# Patient Record
Sex: FEMALE | Race: WHITE | Employment: OTHER | URBAN - METROPOLITAN AREA
[De-identification: names, ages, dates, MRNs, and addresses within clinical notes are randomized per-mention and may not be internally consistent; named-entity substitution may affect disease eponyms.]

---

## 2019-03-04 ENCOUNTER — HOSPITAL ENCOUNTER (INPATIENT)
Age: 64
LOS: 10 days | Discharge: NURSING, REHAB OR LONG-TERM CARE | DRG: 811 | End: 2019-03-14
Attending: EMERGENCY MEDICINE | Admitting: INTERNAL MEDICINE
Payer: MEDICARE

## 2019-03-04 ENCOUNTER — APPOINTMENT (OUTPATIENT)
Dept: GENERAL RADIOLOGY | Age: 64
DRG: 811 | End: 2019-03-04
Payer: MEDICARE

## 2019-03-04 DIAGNOSIS — N39.0 ACUTE UTI: ICD-10-CM

## 2019-03-04 DIAGNOSIS — I95.9 TRANSIENT HYPOTENSION: ICD-10-CM

## 2019-03-04 DIAGNOSIS — D64.9 SYMPTOMATIC ANEMIA: Primary | ICD-10-CM

## 2019-03-04 DIAGNOSIS — G89.4 CHRONIC PAIN SYNDROME: ICD-10-CM

## 2019-03-04 DIAGNOSIS — R53.83 OTHER FATIGUE: ICD-10-CM

## 2019-03-04 LAB
A/G RATIO: 0.6 (ref 1.1–2.2)
ABO/RH: NORMAL
ALBUMIN SERPL-MCNC: 1.7 G/DL (ref 3.4–5)
ALP BLD-CCNC: 147 U/L (ref 40–129)
ALT SERPL-CCNC: 8 U/L (ref 10–40)
ANION GAP SERPL CALCULATED.3IONS-SCNC: 14 MMOL/L (ref 3–16)
ANTIBODY SCREEN: NORMAL
AST SERPL-CCNC: 18 U/L (ref 15–37)
BILIRUB SERPL-MCNC: 0.9 MG/DL (ref 0–1)
BILIRUB SERPL-MCNC: 1 MG/DL (ref 0–1)
BILIRUBIN DIRECT: 0.7 MG/DL (ref 0–0.3)
BILIRUBIN URINE: ABNORMAL
BILIRUBIN, INDIRECT: 0.2 MG/DL (ref 0–1)
BLOOD SMEAR REVIEW: NORMAL
BLOOD, URINE: NEGATIVE
BUN BLDV-MCNC: 9 MG/DL (ref 7–20)
CALCIUM SERPL-MCNC: 7.3 MG/DL (ref 8.3–10.6)
CHLORIDE BLD-SCNC: 95 MMOL/L (ref 99–110)
CLARITY: CLEAR
CO2: 22 MMOL/L (ref 21–32)
COLOR: ABNORMAL
CREAT SERPL-MCNC: 0.5 MG/DL (ref 0.6–1.2)
EPITHELIAL CELLS, UA: 1 /HPF (ref 0–5)
FERRITIN: 18.3 NG/ML (ref 15–150)
GFR AFRICAN AMERICAN: >60
GFR NON-AFRICAN AMERICAN: >60
GLOBULIN: 3 G/DL
GLUCOSE BLD-MCNC: 95 MG/DL (ref 70–99)
GLUCOSE URINE: NEGATIVE MG/DL
HAPTOGLOBIN: 78 MG/DL (ref 30–200)
HCT VFR BLD CALC: 11.6 % (ref 36–48)
HYALINE CASTS: 1 /LPF (ref 0–8)
IMMATURE RETIC FRACT: 0.41 (ref 0.21–0.37)
IRON SATURATION: 8 % (ref 15–50)
IRON: 15 UG/DL (ref 37–145)
KETONES, URINE: ABNORMAL MG/DL
LACTATE DEHYDROGENASE: 244 U/L (ref 100–190)
LACTIC ACID: 2.2 MMOL/L (ref 0.4–2)
LEUKOCYTE ESTERASE, URINE: ABNORMAL
LIPASE: 32 U/L (ref 13–60)
MICROSCOPIC EXAMINATION: YES
NITRITE, URINE: NEGATIVE
OCCULT BLOOD DIAGNOSTIC: NORMAL
PH UA: 5.5 (ref 5–8)
POTASSIUM REFLEX MAGNESIUM: 3.7 MMOL/L (ref 3.5–5.1)
PROTEIN UA: NEGATIVE MG/DL
RBC UA: 2 /HPF (ref 0–4)
RETICULOCYTE ABSOLUTE COUNT: 0.05 M/UL (ref 0.02–0.1)
RETICULOCYTE COUNT PCT: 3.37 % (ref 0.5–2.18)
SODIUM BLD-SCNC: 131 MMOL/L (ref 136–145)
SPECIFIC GRAVITY UA: 1.01 (ref 1–1.03)
TOTAL IRON BINDING CAPACITY: 189 UG/DL (ref 260–445)
TOTAL PROTEIN: 4.7 G/DL (ref 6.4–8.2)
TROPONIN: <0.01 NG/ML
URINE REFLEX TO CULTURE: YES
URINE TYPE: ABNORMAL
UROBILINOGEN, URINE: 4 E.U./DL
WBC UA: 19 /HPF (ref 0–5)

## 2019-03-04 PROCEDURE — 82247 BILIRUBIN TOTAL: CPT

## 2019-03-04 PROCEDURE — 83605 ASSAY OF LACTIC ACID: CPT

## 2019-03-04 PROCEDURE — 1200000000 HC SEMI PRIVATE

## 2019-03-04 PROCEDURE — 71045 X-RAY EXAM CHEST 1 VIEW: CPT

## 2019-03-04 PROCEDURE — 81001 URINALYSIS AUTO W/SCOPE: CPT

## 2019-03-04 PROCEDURE — 2580000003 HC RX 258: Performed by: EMERGENCY MEDICINE

## 2019-03-04 PROCEDURE — 86900 BLOOD TYPING SEROLOGIC ABO: CPT

## 2019-03-04 PROCEDURE — 6360000002 HC RX W HCPCS: Performed by: EMERGENCY MEDICINE

## 2019-03-04 PROCEDURE — 86923 COMPATIBILITY TEST ELECTRIC: CPT

## 2019-03-04 PROCEDURE — 83540 ASSAY OF IRON: CPT

## 2019-03-04 PROCEDURE — 83010 ASSAY OF HAPTOGLOBIN QUANT: CPT

## 2019-03-04 PROCEDURE — 85025 COMPLETE CBC W/AUTO DIFF WBC: CPT

## 2019-03-04 PROCEDURE — 96361 HYDRATE IV INFUSION ADD-ON: CPT

## 2019-03-04 PROCEDURE — 85045 AUTOMATED RETICULOCYTE COUNT: CPT

## 2019-03-04 PROCEDURE — 86850 RBC ANTIBODY SCREEN: CPT

## 2019-03-04 PROCEDURE — 83615 LACTATE (LD) (LDH) ENZYME: CPT

## 2019-03-04 PROCEDURE — 82728 ASSAY OF FERRITIN: CPT

## 2019-03-04 PROCEDURE — 83550 IRON BINDING TEST: CPT

## 2019-03-04 PROCEDURE — 80053 COMPREHEN METABOLIC PANEL: CPT

## 2019-03-04 PROCEDURE — 82248 BILIRUBIN DIRECT: CPT

## 2019-03-04 PROCEDURE — 86901 BLOOD TYPING SEROLOGIC RH(D): CPT

## 2019-03-04 PROCEDURE — 87086 URINE CULTURE/COLONY COUNT: CPT

## 2019-03-04 PROCEDURE — 36430 TRANSFUSION BLD/BLD COMPNT: CPT

## 2019-03-04 PROCEDURE — 96365 THER/PROPH/DIAG IV INF INIT: CPT

## 2019-03-04 PROCEDURE — P9016 RBC LEUKOCYTES REDUCED: HCPCS

## 2019-03-04 PROCEDURE — 83690 ASSAY OF LIPASE: CPT

## 2019-03-04 PROCEDURE — G0328 FECAL BLOOD SCRN IMMUNOASSAY: HCPCS

## 2019-03-04 PROCEDURE — 99285 EMERGENCY DEPT VISIT HI MDM: CPT

## 2019-03-04 PROCEDURE — 93005 ELECTROCARDIOGRAM TRACING: CPT | Performed by: EMERGENCY MEDICINE

## 2019-03-04 PROCEDURE — 84484 ASSAY OF TROPONIN QUANT: CPT

## 2019-03-04 RX ORDER — BUPRENORPHINE AND NALOXONE 8; 2 MG/1; MG/1
1 FILM, SOLUBLE BUCCAL; SUBLINGUAL 2 TIMES DAILY
Status: ON HOLD | COMMUNITY
End: 2019-03-14 | Stop reason: SDUPTHER

## 2019-03-04 RX ORDER — 0.9 % SODIUM CHLORIDE 0.9 %
1000 INTRAVENOUS SOLUTION INTRAVENOUS ONCE
Status: COMPLETED | OUTPATIENT
Start: 2019-03-04 | End: 2019-03-04

## 2019-03-04 RX ORDER — CALCIUM CARBONATE 200(500)MG
1 TABLET,CHEWABLE ORAL PRN
Status: ON HOLD | COMMUNITY
End: 2022-09-06 | Stop reason: SDUPTHER

## 2019-03-04 RX ORDER — BUPRENORPHINE HYDROCHLORIDE, NALOXONE HYDROCHLORIDE 8; 2 MG/1; MG/1
FILM, SOLUBLE BUCCAL; SUBLINGUAL
Refills: 0 | COMMUNITY
Start: 2019-02-05 | End: 2019-03-04

## 2019-03-04 RX ORDER — 0.9 % SODIUM CHLORIDE 0.9 %
250 INTRAVENOUS SOLUTION INTRAVENOUS ONCE
Status: COMPLETED | OUTPATIENT
Start: 2019-03-04 | End: 2019-03-05

## 2019-03-04 RX ADMIN — CEFTRIAXONE 1 G: 1 INJECTION, POWDER, FOR SOLUTION INTRAMUSCULAR; INTRAVENOUS at 22:59

## 2019-03-04 RX ADMIN — SODIUM CHLORIDE 1000 ML: 9 INJECTION, SOLUTION INTRAVENOUS at 18:04

## 2019-03-04 RX ADMIN — SODIUM CHLORIDE 250 ML: 9 INJECTION, SOLUTION INTRAVENOUS at 22:22

## 2019-03-04 ASSESSMENT — ENCOUNTER SYMPTOMS
BACK PAIN: 0
TROUBLE SWALLOWING: 0
PHOTOPHOBIA: 0
COUGH: 0
NAUSEA: 1
SHORTNESS OF BREATH: 0
VOMITING: 1
ABDOMINAL PAIN: 0
CHEST TIGHTNESS: 0
COLOR CHANGE: 0

## 2019-03-05 ENCOUNTER — APPOINTMENT (OUTPATIENT)
Dept: CT IMAGING | Age: 64
DRG: 811 | End: 2019-03-05
Payer: MEDICARE

## 2019-03-05 PROBLEM — E43 SEVERE MALNUTRITION (HCC): Chronic | Status: ACTIVE | Noted: 2019-03-05

## 2019-03-05 LAB
ANISOCYTOSIS: ABNORMAL
ANISOCYTOSIS: ABNORMAL
APTT: 48.7 SEC (ref 26–36)
BASOPHILS ABSOLUTE: 0 K/UL (ref 0–0.2)
BASOPHILS ABSOLUTE: 0 K/UL (ref 0–0.2)
BASOPHILS RELATIVE PERCENT: 0.2 %
BASOPHILS RELATIVE PERCENT: 0.2 %
EOSINOPHILS ABSOLUTE: 0 K/UL (ref 0–0.6)
EOSINOPHILS ABSOLUTE: 0 K/UL (ref 0–0.6)
EOSINOPHILS RELATIVE PERCENT: 0 %
EOSINOPHILS RELATIVE PERCENT: 0.1 %
FIBRINOGEN: 61 MG/DL (ref 200–397)
HCT VFR BLD CALC: 11.6 % (ref 36–48)
HCT VFR BLD CALC: 32.8 % (ref 36–48)
HCT VFR BLD CALC: 33.6 % (ref 36–48)
HEMATOLOGY PATH CONSULT: NO
HEMATOLOGY PATH CONSULT: NORMAL
HEMOGLOBIN: 10.5 G/DL (ref 12–16)
HEMOGLOBIN: 10.5 G/DL (ref 12–16)
HEMOGLOBIN: 3 G/DL (ref 12–16)
HYPOCHROMIA: ABNORMAL
HYPOCHROMIA: ABNORMAL
INR BLD: 1.95 (ref 0.86–1.14)
LYMPHOCYTES ABSOLUTE: 0.9 K/UL (ref 1–5.1)
LYMPHOCYTES ABSOLUTE: 1.1 K/UL (ref 1–5.1)
LYMPHOCYTES RELATIVE PERCENT: 15.2 %
LYMPHOCYTES RELATIVE PERCENT: 15.6 %
MCH RBC QN AUTO: 17 PG (ref 26–34)
MCH RBC QN AUTO: 26.6 PG (ref 26–34)
MCHC RBC AUTO-ENTMCNC: 25.6 G/DL (ref 31–36)
MCHC RBC AUTO-ENTMCNC: 31.9 G/DL (ref 31–36)
MCV RBC AUTO: 66.6 FL (ref 80–100)
MCV RBC AUTO: 83.2 FL (ref 80–100)
MICROCYTES: ABNORMAL
MONOCYTES ABSOLUTE: 0.3 K/UL (ref 0–1.3)
MONOCYTES ABSOLUTE: 0.4 K/UL (ref 0–1.3)
MONOCYTES RELATIVE PERCENT: 5.3 %
MONOCYTES RELATIVE PERCENT: 6.1 %
NEUTROPHILS ABSOLUTE: 4.4 K/UL (ref 1.7–7.7)
NEUTROPHILS ABSOLUTE: 5.5 K/UL (ref 1.7–7.7)
NEUTROPHILS RELATIVE PERCENT: 78.4 %
NEUTROPHILS RELATIVE PERCENT: 78.9 %
PDW BLD-RTO: 22.1 % (ref 12.4–15.4)
PDW BLD-RTO: 23.3 % (ref 12.4–15.4)
PHOSPHORUS: 3 MG/DL (ref 2.5–4.9)
PLATELET # BLD: 118 K/UL (ref 135–450)
PLATELET # BLD: 229 K/UL (ref 135–450)
PMV BLD AUTO: 9.1 FL (ref 5–10.5)
PMV BLD AUTO: 9.4 FL (ref 5–10.5)
POIKILOCYTES: ABNORMAL
POLYCHROMASIA: ABNORMAL
POLYCHROMASIA: ABNORMAL
PROTHROMBIN TIME: 22.2 SEC (ref 9.8–13)
RBC # BLD: 1.7 M/UL (ref 4–5.2)
RBC # BLD: 3.94 M/UL (ref 4–5.2)
STOMATOCYTES: ABNORMAL
TARGET CELLS: ABNORMAL
TSH SERPL DL<=0.05 MIU/L-ACNC: 0.12 UIU/ML (ref 0.27–4.2)
WBC # BLD: 5.5 K/UL (ref 4–11)
WBC # BLD: 7 K/UL (ref 4–11)

## 2019-03-05 PROCEDURE — 2580000003 HC RX 258: Performed by: INTERNAL MEDICINE

## 2019-03-05 PROCEDURE — 6360000002 HC RX W HCPCS: Performed by: INTERNAL MEDICINE

## 2019-03-05 PROCEDURE — 36415 COLL VENOUS BLD VENIPUNCTURE: CPT

## 2019-03-05 PROCEDURE — 85018 HEMOGLOBIN: CPT

## 2019-03-05 PROCEDURE — 97162 PT EVAL MOD COMPLEX 30 MIN: CPT

## 2019-03-05 PROCEDURE — C9113 INJ PANTOPRAZOLE SODIUM, VIA: HCPCS | Performed by: INTERNAL MEDICINE

## 2019-03-05 PROCEDURE — 85730 THROMBOPLASTIN TIME PARTIAL: CPT

## 2019-03-05 PROCEDURE — 6360000004 HC RX CONTRAST MEDICATION: Performed by: FAMILY MEDICINE

## 2019-03-05 PROCEDURE — 84443 ASSAY THYROID STIM HORMONE: CPT

## 2019-03-05 PROCEDURE — P9016 RBC LEUKOCYTES REDUCED: HCPCS

## 2019-03-05 PROCEDURE — 93010 ELECTROCARDIOGRAM REPORT: CPT | Performed by: INTERNAL MEDICINE

## 2019-03-05 PROCEDURE — 71260 CT THORAX DX C+: CPT

## 2019-03-05 PROCEDURE — 6360000002 HC RX W HCPCS: Performed by: FAMILY MEDICINE

## 2019-03-05 PROCEDURE — 85025 COMPLETE CBC W/AUTO DIFF WBC: CPT

## 2019-03-05 PROCEDURE — 2500000003 HC RX 250 WO HCPCS: Performed by: INTERNAL MEDICINE

## 2019-03-05 PROCEDURE — 85384 FIBRINOGEN ACTIVITY: CPT

## 2019-03-05 PROCEDURE — 97166 OT EVAL MOD COMPLEX 45 MIN: CPT

## 2019-03-05 PROCEDURE — 97535 SELF CARE MNGMENT TRAINING: CPT

## 2019-03-05 PROCEDURE — 93971 EXTREMITY STUDY: CPT

## 2019-03-05 PROCEDURE — 84100 ASSAY OF PHOSPHORUS: CPT

## 2019-03-05 PROCEDURE — 2060000000 HC ICU INTERMEDIATE R&B

## 2019-03-05 PROCEDURE — 85610 PROTHROMBIN TIME: CPT

## 2019-03-05 PROCEDURE — 85014 HEMATOCRIT: CPT

## 2019-03-05 PROCEDURE — 97530 THERAPEUTIC ACTIVITIES: CPT

## 2019-03-05 PROCEDURE — 36430 TRANSFUSION BLD/BLD COMPNT: CPT

## 2019-03-05 PROCEDURE — 6370000000 HC RX 637 (ALT 250 FOR IP): Performed by: INTERNAL MEDICINE

## 2019-03-05 PROCEDURE — 94760 N-INVAS EAR/PLS OXIMETRY 1: CPT

## 2019-03-05 RX ORDER — PANTOPRAZOLE SODIUM 40 MG/10ML
40 INJECTION, POWDER, LYOPHILIZED, FOR SOLUTION INTRAVENOUS 2 TIMES DAILY
Status: DISCONTINUED | OUTPATIENT
Start: 2019-03-05 | End: 2019-03-08

## 2019-03-05 RX ORDER — ACETAMINOPHEN 325 MG/1
650 TABLET ORAL EVERY 8 HOURS PRN
Status: DISCONTINUED | OUTPATIENT
Start: 2019-03-05 | End: 2019-03-13 | Stop reason: SDUPTHER

## 2019-03-05 RX ORDER — THIAMINE HYDROCHLORIDE 100 MG/ML
100 INJECTION, SOLUTION INTRAMUSCULAR; INTRAVENOUS DAILY
Status: DISCONTINUED | OUTPATIENT
Start: 2019-03-06 | End: 2019-03-05

## 2019-03-05 RX ORDER — BUPRENORPHINE AND NALOXONE 8; 2 MG/1; MG/1
1 FILM, SOLUBLE BUCCAL; SUBLINGUAL 2 TIMES DAILY
Status: DISCONTINUED | OUTPATIENT
Start: 2019-03-05 | End: 2019-03-05

## 2019-03-05 RX ORDER — HEPARIN SODIUM 1000 [USP'U]/ML
40 INJECTION, SOLUTION INTRAVENOUS; SUBCUTANEOUS PRN
Status: DISCONTINUED | OUTPATIENT
Start: 2019-03-05 | End: 2019-03-05

## 2019-03-05 RX ORDER — POLYETHYLENE GLYCOL 3350 17 G/17G
17 POWDER, FOR SOLUTION ORAL DAILY
Status: DISCONTINUED | OUTPATIENT
Start: 2019-03-05 | End: 2019-03-09

## 2019-03-05 RX ORDER — PEDIATRIC MULTIPLE VITAMIN LIQ
5 LIQUID ORAL DAILY
Status: DISCONTINUED | OUTPATIENT
Start: 2019-03-06 | End: 2019-03-07

## 2019-03-05 RX ORDER — SODIUM CHLORIDE 0.9 % (FLUSH) 0.9 %
10 SYRINGE (ML) INJECTION PRN
Status: DISCONTINUED | OUTPATIENT
Start: 2019-03-05 | End: 2019-03-07 | Stop reason: SDUPTHER

## 2019-03-05 RX ORDER — 0.9 % SODIUM CHLORIDE 0.9 %
10 VIAL (ML) INJECTION 2 TIMES DAILY
Status: DISCONTINUED | OUTPATIENT
Start: 2019-03-05 | End: 2019-03-08

## 2019-03-05 RX ORDER — SODIUM CHLORIDE 0.9 % (FLUSH) 0.9 %
10 SYRINGE (ML) INJECTION EVERY 12 HOURS SCHEDULED
Status: DISCONTINUED | OUTPATIENT
Start: 2019-03-05 | End: 2019-03-07 | Stop reason: SDUPTHER

## 2019-03-05 RX ORDER — HEPARIN SODIUM 10000 [USP'U]/100ML
2.5 INJECTION, SOLUTION INTRAVENOUS CONTINUOUS
Status: DISCONTINUED | OUTPATIENT
Start: 2019-03-05 | End: 2019-03-09

## 2019-03-05 RX ORDER — HEPARIN SODIUM 1000 [USP'U]/ML
2700 INJECTION, SOLUTION INTRAVENOUS; SUBCUTANEOUS ONCE
Status: COMPLETED | OUTPATIENT
Start: 2019-03-05 | End: 2019-03-05

## 2019-03-05 RX ORDER — BUPRENORPHINE AND NALOXONE 8; 2 MG/1; MG/1
0.5 FILM, SOLUBLE BUCCAL; SUBLINGUAL 2 TIMES DAILY
Status: DISCONTINUED | OUTPATIENT
Start: 2019-03-05 | End: 2019-03-06

## 2019-03-05 RX ORDER — HEPARIN SODIUM 1000 [USP'U]/ML
80 INJECTION, SOLUTION INTRAVENOUS; SUBCUTANEOUS PRN
Status: DISCONTINUED | OUTPATIENT
Start: 2019-03-05 | End: 2019-03-05

## 2019-03-05 RX ORDER — THIAMINE HYDROCHLORIDE 100 MG/ML
100 INJECTION, SOLUTION INTRAMUSCULAR; INTRAVENOUS DAILY
Status: DISCONTINUED | OUTPATIENT
Start: 2019-03-05 | End: 2019-03-05

## 2019-03-05 RX ADMIN — PANTOPRAZOLE SODIUM 40 MG: 40 INJECTION, POWDER, FOR SOLUTION INTRAVENOUS at 08:17

## 2019-03-05 RX ADMIN — PANTOPRAZOLE SODIUM 40 MG: 40 INJECTION, POWDER, FOR SOLUTION INTRAVENOUS at 21:38

## 2019-03-05 RX ADMIN — FOLIC ACID: 5 INJECTION, SOLUTION INTRAMUSCULAR; INTRAVENOUS; SUBCUTANEOUS at 09:31

## 2019-03-05 RX ADMIN — BUPRENORPHINE HYDROCHLORIDE, NALOXONE HYDROCHLORIDE 1 FILM: 8; 2 FILM, SOLUBLE BUCCAL; SUBLINGUAL at 03:28

## 2019-03-05 RX ADMIN — POLYETHYLENE GLYCOL 3350 17 G: 17 POWDER, FOR SOLUTION ORAL at 15:53

## 2019-03-05 RX ADMIN — IOHEXOL 50 ML: 240 INJECTION, SOLUTION INTRATHECAL; INTRAVASCULAR; INTRAVENOUS; ORAL at 13:40

## 2019-03-05 RX ADMIN — IRON SUCROSE 200 MG: 20 INJECTION, SOLUTION INTRAVENOUS at 12:22

## 2019-03-05 RX ADMIN — BUPRENORPHINE HYDROCHLORIDE, NALOXONE HYDROCHLORIDE 0.5 FILM: 8; 2 FILM, SOLUBLE BUCCAL; SUBLINGUAL at 21:36

## 2019-03-05 RX ADMIN — HEPARIN SODIUM AND DEXTROSE 6 ML/HR: 10000; 5 INJECTION INTRAVENOUS at 17:01

## 2019-03-05 RX ADMIN — Medication 10 ML: at 03:17

## 2019-03-05 RX ADMIN — Medication 10 ML: at 08:17

## 2019-03-05 RX ADMIN — PANTOPRAZOLE SODIUM 40 MG: 40 INJECTION, POWDER, FOR SOLUTION INTRAVENOUS at 03:16

## 2019-03-05 RX ADMIN — HEPARIN SODIUM 2700 UNITS: 1000 INJECTION INTRAVENOUS; SUBCUTANEOUS at 17:01

## 2019-03-05 RX ADMIN — Medication 10 ML: at 09:32

## 2019-03-05 RX ADMIN — Medication 10 ML: at 21:38

## 2019-03-05 RX ADMIN — IOPAMIDOL 75 ML: 755 INJECTION, SOLUTION INTRAVENOUS at 13:41

## 2019-03-05 RX ADMIN — BUPRENORPHINE HYDROCHLORIDE, NALOXONE HYDROCHLORIDE 0.5 FILM: 8; 2 FILM, SOLUBLE BUCCAL; SUBLINGUAL at 12:22

## 2019-03-05 ASSESSMENT — PAIN DESCRIPTION - ORIENTATION
ORIENTATION: RIGHT;LEFT;MID
ORIENTATION: RIGHT;LEFT;MID
ORIENTATION: LEFT;RIGHT

## 2019-03-05 ASSESSMENT — PAIN DESCRIPTION - PAIN TYPE
TYPE: CHRONIC PAIN

## 2019-03-05 ASSESSMENT — PAIN DESCRIPTION - LOCATION
LOCATION: BACK;LEG

## 2019-03-05 ASSESSMENT — PAIN DESCRIPTION - DESCRIPTORS
DESCRIPTORS: RADIATING
DESCRIPTORS: THROBBING
DESCRIPTORS: RADIATING

## 2019-03-05 ASSESSMENT — PAIN SCALES - GENERAL
PAINLEVEL_OUTOF10: 4
PAINLEVEL_OUTOF10: 2
PAINLEVEL_OUTOF10: 7
PAINLEVEL_OUTOF10: 0
PAINLEVEL_OUTOF10: 2
PAINLEVEL_OUTOF10: 4
PAINLEVEL_OUTOF10: 0
PAINLEVEL_OUTOF10: 0

## 2019-03-06 ENCOUNTER — ANESTHESIA (OUTPATIENT)
Dept: ENDOSCOPY | Age: 64
DRG: 811 | End: 2019-03-06
Payer: MEDICARE

## 2019-03-06 ENCOUNTER — ANESTHESIA EVENT (OUTPATIENT)
Dept: ENDOSCOPY | Age: 64
DRG: 811 | End: 2019-03-06
Payer: MEDICARE

## 2019-03-06 VITALS
RESPIRATION RATE: 14 BRPM | OXYGEN SATURATION: 100 % | DIASTOLIC BLOOD PRESSURE: 62 MMHG | SYSTOLIC BLOOD PRESSURE: 101 MMHG

## 2019-03-06 LAB
ALBUMIN SERPL-MCNC: 1.7 G/DL (ref 3.1–4.9)
ALPHA-1-GLOBULIN: 0.3 G/DL (ref 0.2–0.4)
ALPHA-2-GLOBULIN: 0.4 G/DL (ref 0.4–1.1)
AMYLASE FLUID: >7500 U/L
ANION GAP SERPL CALCULATED.3IONS-SCNC: 14 MMOL/L (ref 3–16)
ANISOCYTOSIS: ABNORMAL
APTT: 59.4 SEC (ref 26–36)
APTT: 61.3 SEC (ref 26–36)
BASOPHILS ABSOLUTE: 0 K/UL (ref 0–0.2)
BASOPHILS ABSOLUTE: 0 K/UL (ref 0–0.2)
BASOPHILS ABSOLUTE: 0.1 K/UL (ref 0–0.2)
BASOPHILS RELATIVE PERCENT: 0.2 %
BASOPHILS RELATIVE PERCENT: 0.2 %
BASOPHILS RELATIVE PERCENT: 1.3 %
BETA GLOBULIN: 0.7 G/DL (ref 0.9–1.6)
BUN BLDV-MCNC: 7 MG/DL (ref 7–20)
CALCIUM SERPL-MCNC: 7.3 MG/DL (ref 8.3–10.6)
CHLORIDE BLD-SCNC: 100 MMOL/L (ref 99–110)
CO2: 20 MMOL/L (ref 21–32)
CREAT SERPL-MCNC: 0.5 MG/DL (ref 0.6–1.2)
EOSINOPHILS ABSOLUTE: 0 K/UL (ref 0–0.6)
EOSINOPHILS RELATIVE PERCENT: 0.3 %
EOSINOPHILS RELATIVE PERCENT: 0.4 %
EOSINOPHILS RELATIVE PERCENT: 0.4 %
FLUID TYPE: NORMAL
FOLATE: 12.5 NG/ML (ref 4.78–24.2)
GAMMA GLOBULIN: 1 G/DL (ref 0.6–1.8)
GFR AFRICAN AMERICAN: >60
GFR NON-AFRICAN AMERICAN: >60
GLUCOSE BLD-MCNC: 64 MG/DL (ref 70–99)
HCT VFR BLD CALC: 29.6 % (ref 36–48)
HCT VFR BLD CALC: 29.6 % (ref 36–48)
HCT VFR BLD CALC: 29.7 % (ref 36–48)
HCT VFR BLD CALC: 32.7 % (ref 36–48)
HEMOGLOBIN: 10.2 G/DL (ref 12–16)
HEMOGLOBIN: 9.4 G/DL (ref 12–16)
LYMPHOCYTES ABSOLUTE: 0.9 K/UL (ref 1–5.1)
LYMPHOCYTES ABSOLUTE: 1 K/UL (ref 1–5.1)
LYMPHOCYTES ABSOLUTE: 1.3 K/UL (ref 1–5.1)
LYMPHOCYTES RELATIVE PERCENT: 15.3 %
LYMPHOCYTES RELATIVE PERCENT: 15.6 %
LYMPHOCYTES RELATIVE PERCENT: 20.3 %
MCH RBC QN AUTO: 26.3 PG (ref 26–34)
MCH RBC QN AUTO: 26.5 PG (ref 26–34)
MCH RBC QN AUTO: 26.7 PG (ref 26–34)
MCHC RBC AUTO-ENTMCNC: 31.1 G/DL (ref 31–36)
MCHC RBC AUTO-ENTMCNC: 31.7 G/DL (ref 31–36)
MCHC RBC AUTO-ENTMCNC: 31.8 G/DL (ref 31–36)
MCV RBC AUTO: 83.6 FL (ref 80–100)
MCV RBC AUTO: 84.1 FL (ref 80–100)
MCV RBC AUTO: 84.6 FL (ref 80–100)
MONOCYTES ABSOLUTE: 0.3 K/UL (ref 0–1.3)
MONOCYTES RELATIVE PERCENT: 5.2 %
MONOCYTES RELATIVE PERCENT: 5.3 %
MONOCYTES RELATIVE PERCENT: 5.9 %
NEUTROPHILS ABSOLUTE: 4.6 K/UL (ref 1.7–7.7)
NEUTROPHILS ABSOLUTE: 4.6 K/UL (ref 1.7–7.7)
NEUTROPHILS ABSOLUTE: 4.9 K/UL (ref 1.7–7.7)
NEUTROPHILS RELATIVE PERCENT: 72.7 %
NEUTROPHILS RELATIVE PERCENT: 78.3 %
NEUTROPHILS RELATIVE PERCENT: 78.6 %
PDW BLD-RTO: 22.8 % (ref 12.4–15.4)
PDW BLD-RTO: 22.9 % (ref 12.4–15.4)
PDW BLD-RTO: 23.4 % (ref 12.4–15.4)
PHOSPHORUS: 2.5 MG/DL (ref 2.5–4.9)
PLATELET # BLD: 103 K/UL (ref 135–450)
PLATELET # BLD: 111 K/UL (ref 135–450)
PLATELET # BLD: 98 K/UL (ref 135–450)
PLATELET SLIDE REVIEW: ABNORMAL
PMV BLD AUTO: 8.6 FL (ref 5–10.5)
PMV BLD AUTO: 8.7 FL (ref 5–10.5)
PMV BLD AUTO: 8.9 FL (ref 5–10.5)
POLYCHROMASIA: ABNORMAL
POTASSIUM SERPL-SCNC: 3.5 MMOL/L (ref 3.5–5.1)
RBC # BLD: 3.52 M/UL (ref 4–5.2)
RBC # BLD: 3.55 M/UL (ref 4–5.2)
RBC # BLD: 3.86 M/UL (ref 4–5.2)
REASON FOR REJECTION: NORMAL
REJECTED TEST: NORMAL
SLIDE REVIEW: ABNORMAL
SLIDE REVIEW: ABNORMAL
SODIUM BLD-SCNC: 134 MMOL/L (ref 136–145)
SPE/IFE INTERPRETATION: NORMAL
T4 FREE: 0.8 NG/DL (ref 0.9–1.8)
TOTAL PROTEIN: 4.1 G/DL (ref 6.4–8.2)
URINE CULTURE, ROUTINE: NORMAL
VITAMIN B-12: 1478 PG/ML (ref 211–911)
WBC # BLD: 5.9 K/UL (ref 4–11)
WBC # BLD: 6.3 K/UL (ref 4–11)
WBC # BLD: 6.4 K/UL (ref 4–11)

## 2019-03-06 PROCEDURE — 0DJ07ZZ INSPECTION OF UPPER INTESTINAL TRACT, VIA NATURAL OR ARTIFICIAL OPENING: ICD-10-PCS | Performed by: INTERNAL MEDICINE

## 2019-03-06 PROCEDURE — 2060000000 HC ICU INTERMEDIATE R&B

## 2019-03-06 PROCEDURE — 6360000002 HC RX W HCPCS: Performed by: INTERNAL MEDICINE

## 2019-03-06 PROCEDURE — 82746 ASSAY OF FOLIC ACID SERUM: CPT

## 2019-03-06 PROCEDURE — 82150 ASSAY OF AMYLASE: CPT

## 2019-03-06 PROCEDURE — 3700000000 HC ANESTHESIA ATTENDED CARE: Performed by: INTERNAL MEDICINE

## 2019-03-06 PROCEDURE — C1726 CATH, BAL DIL, NON-VASCULAR: HCPCS | Performed by: INTERNAL MEDICINE

## 2019-03-06 PROCEDURE — 3609020800 HC EGD W/EUS FNA: Performed by: INTERNAL MEDICINE

## 2019-03-06 PROCEDURE — 2580000003 HC RX 258: Performed by: INTERNAL MEDICINE

## 2019-03-06 PROCEDURE — 6370000000 HC RX 637 (ALT 250 FOR IP): Performed by: INTERNAL MEDICINE

## 2019-03-06 PROCEDURE — 94760 N-INVAS EAR/PLS OXIMETRY 1: CPT

## 2019-03-06 PROCEDURE — 36415 COLL VENOUS BLD VENIPUNCTURE: CPT

## 2019-03-06 PROCEDURE — 88305 TISSUE EXAM BY PATHOLOGIST: CPT

## 2019-03-06 PROCEDURE — 2709999900 HC NON-CHARGEABLE SUPPLY: Performed by: INTERNAL MEDICINE

## 2019-03-06 PROCEDURE — 85025 COMPLETE CBC W/AUTO DIFF WBC: CPT

## 2019-03-06 PROCEDURE — 82607 VITAMIN B-12: CPT

## 2019-03-06 PROCEDURE — C9113 INJ PANTOPRAZOLE SODIUM, VIA: HCPCS | Performed by: INTERNAL MEDICINE

## 2019-03-06 PROCEDURE — 6360000002 HC RX W HCPCS: Performed by: FAMILY MEDICINE

## 2019-03-06 PROCEDURE — 6360000002 HC RX W HCPCS: Performed by: NURSE ANESTHETIST, CERTIFIED REGISTERED

## 2019-03-06 PROCEDURE — 88112 CYTOPATH CELL ENHANCE TECH: CPT

## 2019-03-06 PROCEDURE — 2580000003 HC RX 258: Performed by: NURSE ANESTHETIST, CERTIFIED REGISTERED

## 2019-03-06 PROCEDURE — 84165 PROTEIN E-PHORESIS SERUM: CPT

## 2019-03-06 PROCEDURE — 7100000000 HC PACU RECOVERY - FIRST 15 MIN: Performed by: INTERNAL MEDICINE

## 2019-03-06 PROCEDURE — 85014 HEMATOCRIT: CPT

## 2019-03-06 PROCEDURE — 3609012500 HC EGD DILATION BALLOON: Performed by: INTERNAL MEDICINE

## 2019-03-06 PROCEDURE — 84439 ASSAY OF FREE THYROXINE: CPT

## 2019-03-06 PROCEDURE — 83020 HEMOGLOBIN ELECTROPHORESIS: CPT

## 2019-03-06 PROCEDURE — 3609012400 HC EGD TRANSORAL BIOPSY SINGLE/MULTIPLE: Performed by: INTERNAL MEDICINE

## 2019-03-06 PROCEDURE — 0DB58ZX EXCISION OF ESOPHAGUS, VIA NATURAL OR ARTIFICIAL OPENING ENDOSCOPIC, DIAGNOSTIC: ICD-10-PCS | Performed by: INTERNAL MEDICINE

## 2019-03-06 PROCEDURE — 86301 IMMUNOASSAY TUMOR CA 19-9: CPT

## 2019-03-06 PROCEDURE — 85018 HEMOGLOBIN: CPT

## 2019-03-06 PROCEDURE — 99211 OFF/OP EST MAY X REQ PHY/QHP: CPT

## 2019-03-06 PROCEDURE — 84155 ASSAY OF PROTEIN SERUM: CPT

## 2019-03-06 PROCEDURE — 99223 1ST HOSP IP/OBS HIGH 75: CPT | Performed by: INTERNAL MEDICINE

## 2019-03-06 PROCEDURE — 85730 THROMBOPLASTIN TIME PARTIAL: CPT

## 2019-03-06 PROCEDURE — 7100000001 HC PACU RECOVERY - ADDTL 15 MIN: Performed by: INTERNAL MEDICINE

## 2019-03-06 PROCEDURE — 0D738ZZ DILATION OF LOWER ESOPHAGUS, VIA NATURAL OR ARTIFICIAL OPENING ENDOSCOPIC: ICD-10-PCS | Performed by: INTERNAL MEDICINE

## 2019-03-06 PROCEDURE — 2500000003 HC RX 250 WO HCPCS: Performed by: NURSE ANESTHETIST, CERTIFIED REGISTERED

## 2019-03-06 PROCEDURE — 80048 BASIC METABOLIC PNL TOTAL CA: CPT

## 2019-03-06 PROCEDURE — 3700000001 HC ADD 15 MINUTES (ANESTHESIA): Performed by: INTERNAL MEDICINE

## 2019-03-06 PROCEDURE — 0D968ZX DRAINAGE OF STOMACH, VIA NATURAL OR ARTIFICIAL OPENING ENDOSCOPIC, DIAGNOSTIC: ICD-10-PCS | Performed by: INTERNAL MEDICINE

## 2019-03-06 PROCEDURE — 82378 CARCINOEMBRYONIC ANTIGEN: CPT

## 2019-03-06 PROCEDURE — 84100 ASSAY OF PHOSPHORUS: CPT

## 2019-03-06 RX ORDER — LABETALOL HYDROCHLORIDE 5 MG/ML
5 INJECTION, SOLUTION INTRAVENOUS EVERY 10 MIN PRN
Status: DISCONTINUED | OUTPATIENT
Start: 2019-03-06 | End: 2019-03-06

## 2019-03-06 RX ORDER — PROMETHAZINE HYDROCHLORIDE 25 MG/ML
6.25 INJECTION, SOLUTION INTRAMUSCULAR; INTRAVENOUS
Status: DISCONTINUED | OUTPATIENT
Start: 2019-03-06 | End: 2019-03-06

## 2019-03-06 RX ORDER — OYSTER SHELL CALCIUM WITH VITAMIN D 500; 200 MG/1; [IU]/1
1 TABLET, FILM COATED ORAL 3 TIMES DAILY
Status: DISCONTINUED | OUTPATIENT
Start: 2019-03-06 | End: 2019-03-14 | Stop reason: HOSPADM

## 2019-03-06 RX ORDER — CIPROFLOXACIN 2 MG/ML
400 INJECTION, SOLUTION INTRAVENOUS EVERY 12 HOURS
Status: COMPLETED | OUTPATIENT
Start: 2019-03-07 | End: 2019-03-11

## 2019-03-06 RX ORDER — ONDANSETRON 2 MG/ML
4 INJECTION INTRAMUSCULAR; INTRAVENOUS
Status: DISCONTINUED | OUTPATIENT
Start: 2019-03-06 | End: 2019-03-06

## 2019-03-06 RX ORDER — PROPOFOL 10 MG/ML
INJECTION, EMULSION INTRAVENOUS CONTINUOUS PRN
Status: DISCONTINUED | OUTPATIENT
Start: 2019-03-06 | End: 2019-03-06 | Stop reason: SDUPTHER

## 2019-03-06 RX ORDER — CIPROFLOXACIN 2 MG/ML
INJECTION, SOLUTION INTRAVENOUS PRN
Status: DISCONTINUED | OUTPATIENT
Start: 2019-03-06 | End: 2019-03-06 | Stop reason: SDUPTHER

## 2019-03-06 RX ORDER — PROPOFOL 10 MG/ML
INJECTION, EMULSION INTRAVENOUS PRN
Status: DISCONTINUED | OUTPATIENT
Start: 2019-03-06 | End: 2019-03-06 | Stop reason: SDUPTHER

## 2019-03-06 RX ORDER — LIDOCAINE HYDROCHLORIDE 20 MG/ML
INJECTION, SOLUTION EPIDURAL; INFILTRATION; INTRACAUDAL; PERINEURAL PRN
Status: DISCONTINUED | OUTPATIENT
Start: 2019-03-06 | End: 2019-03-06 | Stop reason: SDUPTHER

## 2019-03-06 RX ORDER — SODIUM CHLORIDE 9 MG/ML
INJECTION, SOLUTION INTRAVENOUS CONTINUOUS PRN
Status: DISCONTINUED | OUTPATIENT
Start: 2019-03-06 | End: 2019-03-06 | Stop reason: SDUPTHER

## 2019-03-06 RX ORDER — BUPRENORPHINE AND NALOXONE 8; 2 MG/1; MG/1
0.5 FILM, SOLUBLE BUCCAL; SUBLINGUAL 3 TIMES DAILY
Status: DISCONTINUED | OUTPATIENT
Start: 2019-03-06 | End: 2019-03-14 | Stop reason: HOSPADM

## 2019-03-06 RX ADMIN — IRON SUCROSE 200 MG: 20 INJECTION, SOLUTION INTRAVENOUS at 12:09

## 2019-03-06 RX ADMIN — LIDOCAINE HYDROCHLORIDE 60 MG: 20 INJECTION, SOLUTION EPIDURAL; INFILTRATION; INTRACAUDAL; PERINEURAL at 16:16

## 2019-03-06 RX ADMIN — POLYETHYLENE GLYCOL 3350 17 G: 17 POWDER, FOR SOLUTION ORAL at 08:46

## 2019-03-06 RX ADMIN — Medication 10 ML: at 08:47

## 2019-03-06 RX ADMIN — THIAMINE HYDROCHLORIDE 100 MG: 100 INJECTION, SOLUTION INTRAMUSCULAR; INTRAVENOUS at 10:14

## 2019-03-06 RX ADMIN — PANTOPRAZOLE SODIUM 40 MG: 40 INJECTION, POWDER, FOR SOLUTION INTRAVENOUS at 08:46

## 2019-03-06 RX ADMIN — PROPOFOL 120 MCG/KG/MIN: 10 INJECTION, EMULSION INTRAVENOUS at 16:16

## 2019-03-06 RX ADMIN — PANTOPRAZOLE SODIUM 40 MG: 40 INJECTION, POWDER, FOR SOLUTION INTRAVENOUS at 21:08

## 2019-03-06 RX ADMIN — Medication 10 ML: at 21:09

## 2019-03-06 RX ADMIN — Medication 1 TABLET: at 21:09

## 2019-03-06 RX ADMIN — SODIUM CHLORIDE: 9 INJECTION, SOLUTION INTRAVENOUS at 16:10

## 2019-03-06 RX ADMIN — CIPROFLOXACIN 400 MG: 2 INJECTION, SOLUTION INTRAVENOUS at 17:01

## 2019-03-06 RX ADMIN — Medication 5 ML: at 08:46

## 2019-03-06 RX ADMIN — BUPRENORPHINE HYDROCHLORIDE, NALOXONE HYDROCHLORIDE 0.5 FILM: 8; 2 FILM, SOLUBLE BUCCAL; SUBLINGUAL at 21:08

## 2019-03-06 RX ADMIN — BUPRENORPHINE HYDROCHLORIDE, NALOXONE HYDROCHLORIDE 0.5 FILM: 8; 2 FILM, SOLUBLE BUCCAL; SUBLINGUAL at 08:46

## 2019-03-06 RX ADMIN — Medication 10 ML: at 08:46

## 2019-03-06 RX ADMIN — PROPOFOL 60 MG: 10 INJECTION, EMULSION INTRAVENOUS at 16:16

## 2019-03-06 ASSESSMENT — PULMONARY FUNCTION TESTS
PIF_VALUE: 0
PIF_VALUE: 1
PIF_VALUE: 0
PIF_VALUE: 2
PIF_VALUE: 0

## 2019-03-06 ASSESSMENT — PAIN - FUNCTIONAL ASSESSMENT: PAIN_FUNCTIONAL_ASSESSMENT: 0-10

## 2019-03-06 ASSESSMENT — PAIN SCALES - GENERAL
PAINLEVEL_OUTOF10: 0
PAINLEVEL_OUTOF10: 1
PAINLEVEL_OUTOF10: 0

## 2019-03-07 ENCOUNTER — APPOINTMENT (OUTPATIENT)
Dept: GENERAL RADIOLOGY | Age: 64
DRG: 811 | End: 2019-03-07
Payer: MEDICARE

## 2019-03-07 LAB
ANION GAP SERPL CALCULATED.3IONS-SCNC: 16 MMOL/L (ref 3–16)
APTT: 48.5 SEC (ref 26–36)
APTT: 58 SEC (ref 26–36)
APTT: 83.2 SEC (ref 26–36)
BASOPHILS ABSOLUTE: 0 K/UL (ref 0–0.2)
BASOPHILS RELATIVE PERCENT: 0.3 %
BLOOD BANK DISPENSE STATUS: NORMAL
BLOOD BANK PRODUCT CODE: NORMAL
BPU ID: NORMAL
BUN BLDV-MCNC: 5 MG/DL (ref 7–20)
CALCIUM SERPL-MCNC: 7.5 MG/DL (ref 8.3–10.6)
CHLORIDE BLD-SCNC: 99 MMOL/L (ref 99–110)
CO2: 18 MMOL/L (ref 21–32)
CREAT SERPL-MCNC: <0.5 MG/DL (ref 0.6–1.2)
DESCRIPTION BLOOD BANK: NORMAL
EOSINOPHILS ABSOLUTE: 0 K/UL (ref 0–0.6)
EOSINOPHILS RELATIVE PERCENT: 0.7 %
GFR AFRICAN AMERICAN: >60
GFR NON-AFRICAN AMERICAN: >60
GLUCOSE BLD-MCNC: 118 MG/DL (ref 70–99)
HCT VFR BLD CALC: 29.2 % (ref 36–48)
HCT VFR BLD CALC: 29.8 % (ref 36–48)
HCT VFR BLD CALC: 32.2 % (ref 36–48)
HEMOGLOBIN: 10 G/DL (ref 12–16)
HEMOGLOBIN: 9.1 G/DL (ref 12–16)
HEMOGLOBIN: 9.3 G/DL (ref 12–16)
LYMPHOCYTES ABSOLUTE: 0.8 K/UL (ref 1–5.1)
LYMPHOCYTES RELATIVE PERCENT: 14.9 %
MAGNESIUM: 1.5 MG/DL (ref 1.8–2.4)
MCH RBC QN AUTO: 26.5 PG (ref 26–34)
MCHC RBC AUTO-ENTMCNC: 31.1 G/DL (ref 31–36)
MCV RBC AUTO: 85 FL (ref 80–100)
MONOCYTES ABSOLUTE: 0.3 K/UL (ref 0–1.3)
MONOCYTES RELATIVE PERCENT: 5.1 %
NEUTROPHILS ABSOLUTE: 4.2 K/UL (ref 1.7–7.7)
NEUTROPHILS RELATIVE PERCENT: 79 %
PDW BLD-RTO: 22.9 % (ref 12.4–15.4)
PHOSPHORUS: 2.2 MG/DL (ref 2.5–4.9)
PLATELET # BLD: 90 K/UL (ref 135–450)
PLATELET # BLD: 95 K/UL (ref 135–450)
PMV BLD AUTO: 8.5 FL (ref 5–10.5)
POTASSIUM SERPL-SCNC: 3.4 MMOL/L (ref 3.5–5.1)
RBC # BLD: 3.78 M/UL (ref 4–5.2)
SODIUM BLD-SCNC: 133 MMOL/L (ref 136–145)
VITAMIN D 25-HYDROXY: 10.1 NG/ML
WBC # BLD: 5.3 K/UL (ref 4–11)

## 2019-03-07 PROCEDURE — 1200000000 HC SEMI PRIVATE

## 2019-03-07 PROCEDURE — 94760 N-INVAS EAR/PLS OXIMETRY 1: CPT

## 2019-03-07 PROCEDURE — 83735 ASSAY OF MAGNESIUM: CPT

## 2019-03-07 PROCEDURE — 2580000003 HC RX 258: Performed by: INTERNAL MEDICINE

## 2019-03-07 PROCEDURE — 6370000000 HC RX 637 (ALT 250 FOR IP): Performed by: INTERNAL MEDICINE

## 2019-03-07 PROCEDURE — 85018 HEMOGLOBIN: CPT

## 2019-03-07 PROCEDURE — 99232 SBSQ HOSP IP/OBS MODERATE 35: CPT | Performed by: INTERNAL MEDICINE

## 2019-03-07 PROCEDURE — 6370000000 HC RX 637 (ALT 250 FOR IP): Performed by: FAMILY MEDICINE

## 2019-03-07 PROCEDURE — 02HV33Z INSERTION OF INFUSION DEVICE INTO SUPERIOR VENA CAVA, PERCUTANEOUS APPROACH: ICD-10-PCS | Performed by: INTERNAL MEDICINE

## 2019-03-07 PROCEDURE — 76937 US GUIDE VASCULAR ACCESS: CPT

## 2019-03-07 PROCEDURE — 85025 COMPLETE CBC W/AUTO DIFF WBC: CPT

## 2019-03-07 PROCEDURE — C9113 INJ PANTOPRAZOLE SODIUM, VIA: HCPCS | Performed by: INTERNAL MEDICINE

## 2019-03-07 PROCEDURE — 97535 SELF CARE MNGMENT TRAINING: CPT

## 2019-03-07 PROCEDURE — 6360000002 HC RX W HCPCS: Performed by: INTERNAL MEDICINE

## 2019-03-07 PROCEDURE — C1751 CATH, INF, PER/CENT/MIDLINE: HCPCS

## 2019-03-07 PROCEDURE — 36415 COLL VENOUS BLD VENIPUNCTURE: CPT

## 2019-03-07 PROCEDURE — 97530 THERAPEUTIC ACTIVITIES: CPT

## 2019-03-07 PROCEDURE — 82306 VITAMIN D 25 HYDROXY: CPT

## 2019-03-07 PROCEDURE — 85049 AUTOMATED PLATELET COUNT: CPT

## 2019-03-07 PROCEDURE — 2580000003 HC RX 258: Performed by: FAMILY MEDICINE

## 2019-03-07 PROCEDURE — 71045 X-RAY EXAM CHEST 1 VIEW: CPT

## 2019-03-07 PROCEDURE — 85014 HEMATOCRIT: CPT

## 2019-03-07 PROCEDURE — 85730 THROMBOPLASTIN TIME PARTIAL: CPT

## 2019-03-07 PROCEDURE — 84100 ASSAY OF PHOSPHORUS: CPT

## 2019-03-07 PROCEDURE — 97530 THERAPEUTIC ACTIVITIES: CPT | Performed by: PHYSICAL THERAPIST

## 2019-03-07 PROCEDURE — 6370000000 HC RX 637 (ALT 250 FOR IP): Performed by: PHYSICIAN ASSISTANT

## 2019-03-07 PROCEDURE — 36569 INSJ PICC 5 YR+ W/O IMAGING: CPT

## 2019-03-07 PROCEDURE — 80048 BASIC METABOLIC PNL TOTAL CA: CPT

## 2019-03-07 PROCEDURE — 6360000002 HC RX W HCPCS: Performed by: FAMILY MEDICINE

## 2019-03-07 RX ORDER — SODIUM CHLORIDE 0.9 % (FLUSH) 0.9 %
10 SYRINGE (ML) INJECTION EVERY 12 HOURS SCHEDULED
Status: DISCONTINUED | OUTPATIENT
Start: 2019-03-07 | End: 2019-03-14 | Stop reason: HOSPADM

## 2019-03-07 RX ORDER — LIDOCAINE HYDROCHLORIDE 10 MG/ML
5 INJECTION, SOLUTION EPIDURAL; INFILTRATION; INTRACAUDAL; PERINEURAL ONCE
Status: DISCONTINUED | OUTPATIENT
Start: 2019-03-07 | End: 2019-03-14 | Stop reason: HOSPADM

## 2019-03-07 RX ORDER — MAGNESIUM SULFATE IN WATER 40 MG/ML
2 INJECTION, SOLUTION INTRAVENOUS ONCE
Status: COMPLETED | OUTPATIENT
Start: 2019-03-07 | End: 2019-03-07

## 2019-03-07 RX ORDER — MULTIVIT/FOLIC ACID/HERBAL 275 400-200/30
15 LIQUID (ML) ORAL DAILY
Status: DISCONTINUED | OUTPATIENT
Start: 2019-03-07 | End: 2019-03-14 | Stop reason: HOSPADM

## 2019-03-07 RX ORDER — SODIUM CHLORIDE 0.9 % (FLUSH) 0.9 %
10 SYRINGE (ML) INJECTION PRN
Status: DISCONTINUED | OUTPATIENT
Start: 2019-03-07 | End: 2019-03-14 | Stop reason: HOSPADM

## 2019-03-07 RX ADMIN — POLYETHYLENE GLYCOL 3350 17 G: 17 POWDER, FOR SOLUTION ORAL at 08:38

## 2019-03-07 RX ADMIN — CIPROFLOXACIN 400 MG: 2 INJECTION, SOLUTION INTRAVENOUS at 21:46

## 2019-03-07 RX ADMIN — Medication 1 TABLET: at 16:30

## 2019-03-07 RX ADMIN — BISACODYL 10 MG: 5 TABLET, COATED ORAL at 21:46

## 2019-03-07 RX ADMIN — THIAMINE HYDROCHLORIDE 100 MG: 100 INJECTION, SOLUTION INTRAMUSCULAR; INTRAVENOUS at 12:28

## 2019-03-07 RX ADMIN — CIPROFLOXACIN 400 MG: 2 INJECTION, SOLUTION INTRAVENOUS at 08:38

## 2019-03-07 RX ADMIN — Medication 1 TABLET: at 21:46

## 2019-03-07 RX ADMIN — PANTOPRAZOLE SODIUM 40 MG: 40 INJECTION, POWDER, FOR SOLUTION INTRAVENOUS at 21:46

## 2019-03-07 RX ADMIN — IRON SUCROSE 200 MG: 20 INJECTION, SOLUTION INTRAVENOUS at 12:28

## 2019-03-07 RX ADMIN — Medication 10 ML: at 08:38

## 2019-03-07 RX ADMIN — BUPRENORPHINE HYDROCHLORIDE, NALOXONE HYDROCHLORIDE 0.5 FILM: 8; 2 FILM, SOLUBLE BUCCAL; SUBLINGUAL at 21:46

## 2019-03-07 RX ADMIN — DIBASIC SODIUM PHOSPHATE, MONOBASIC POTASSIUM PHOSPHATE AND MONOBASIC SODIUM PHOSPHATE 1 TABLET: 852; 155; 130 TABLET ORAL at 21:46

## 2019-03-07 RX ADMIN — Medication 10 ML: at 21:54

## 2019-03-07 RX ADMIN — BUPRENORPHINE HYDROCHLORIDE, NALOXONE HYDROCHLORIDE 0.5 FILM: 8; 2 FILM, SOLUBLE BUCCAL; SUBLINGUAL at 16:30

## 2019-03-07 RX ADMIN — BUPRENORPHINE HYDROCHLORIDE, NALOXONE HYDROCHLORIDE 0.5 FILM: 8; 2 FILM, SOLUBLE BUCCAL; SUBLINGUAL at 08:38

## 2019-03-07 RX ADMIN — MAGNESIUM SULFATE HEPTAHYDRATE 2 G: 40 INJECTION, SOLUTION INTRAVENOUS at 12:28

## 2019-03-07 RX ADMIN — PANTOPRAZOLE SODIUM 40 MG: 40 INJECTION, POWDER, FOR SOLUTION INTRAVENOUS at 08:38

## 2019-03-07 RX ADMIN — POLYETHYLENE GLYCOL 3350, SODIUM SULFATE ANHYDROUS, SODIUM BICARBONATE, SODIUM CHLORIDE, POTASSIUM CHLORIDE 4000 ML: 236; 22.74; 6.74; 5.86; 2.97 POWDER, FOR SOLUTION ORAL at 16:30

## 2019-03-07 RX ADMIN — Medication 15 ML: at 12:28

## 2019-03-07 RX ADMIN — Medication 1 TABLET: at 08:38

## 2019-03-07 ASSESSMENT — PAIN SCALES - GENERAL
PAINLEVEL_OUTOF10: 0

## 2019-03-08 ENCOUNTER — ANESTHESIA EVENT (OUTPATIENT)
Dept: ENDOSCOPY | Age: 64
DRG: 811 | End: 2019-03-08
Payer: MEDICARE

## 2019-03-08 ENCOUNTER — ANESTHESIA (OUTPATIENT)
Dept: ENDOSCOPY | Age: 64
DRG: 811 | End: 2019-03-08
Payer: MEDICARE

## 2019-03-08 VITALS
RESPIRATION RATE: 10 BRPM | DIASTOLIC BLOOD PRESSURE: 65 MMHG | SYSTOLIC BLOOD PRESSURE: 107 MMHG | OXYGEN SATURATION: 100 %

## 2019-03-08 PROBLEM — D50.0 IRON DEFICIENCY ANEMIA DUE TO CHRONIC BLOOD LOSS: Status: ACTIVE | Noted: 2019-03-08

## 2019-03-08 LAB
APTT: 128.6 SEC (ref 26–36)
APTT: 69.2 SEC (ref 26–36)
APTT: 94.5 SEC (ref 26–36)
BASOPHILS ABSOLUTE: 0 K/UL (ref 0–0.2)
BASOPHILS RELATIVE PERCENT: 0.5 %
CA 19-9: 44 U/ML (ref 0–35)
CEA,FLUID: 23.6 NG/ML
EOSINOPHILS ABSOLUTE: 0.1 K/UL (ref 0–0.6)
EOSINOPHILS RELATIVE PERCENT: 2.6 %
HCT VFR BLD CALC: 28.6 % (ref 36–48)
HCT VFR BLD CALC: 29.3 % (ref 36–48)
HEMOGLOBIN: 9 G/DL (ref 12–16)
HEMOGLOBIN: 9.1 G/DL (ref 12–16)
LYMPHOCYTES ABSOLUTE: 0.7 K/UL (ref 1–5.1)
LYMPHOCYTES RELATIVE PERCENT: 13.9 %
MCH RBC QN AUTO: 26.6 PG (ref 26–34)
MCHC RBC AUTO-ENTMCNC: 31.6 G/DL (ref 31–36)
MCV RBC AUTO: 84.3 FL (ref 80–100)
MONOCYTES ABSOLUTE: 0.4 K/UL (ref 0–1.3)
MONOCYTES RELATIVE PERCENT: 7.3 %
NEUTROPHILS ABSOLUTE: 4 K/UL (ref 1.7–7.7)
NEUTROPHILS RELATIVE PERCENT: 75.7 %
PDW BLD-RTO: 23.9 % (ref 12.4–15.4)
PLATELET # BLD: 70 K/UL (ref 135–450)
PMV BLD AUTO: 8.9 FL (ref 5–10.5)
RBC # BLD: 3.39 M/UL (ref 4–5.2)
REASON FOR REJECTION: NORMAL
REASON FOR REJECTION: NORMAL
REJECTED TEST: NORMAL
REJECTED TEST: NORMAL
SOURCE BODY FLUID: NORMAL
WBC # BLD: 5.3 K/UL (ref 4–11)

## 2019-03-08 PROCEDURE — 6370000000 HC RX 637 (ALT 250 FOR IP): Performed by: INTERNAL MEDICINE

## 2019-03-08 PROCEDURE — 6360000002 HC RX W HCPCS: Performed by: INTERNAL MEDICINE

## 2019-03-08 PROCEDURE — 36415 COLL VENOUS BLD VENIPUNCTURE: CPT

## 2019-03-08 PROCEDURE — 85732 THROMBOPLASTIN TIME PARTIAL: CPT

## 2019-03-08 PROCEDURE — 2500000003 HC RX 250 WO HCPCS: Performed by: NURSE ANESTHETIST, CERTIFIED REGISTERED

## 2019-03-08 PROCEDURE — 6370000000 HC RX 637 (ALT 250 FOR IP): Performed by: FAMILY MEDICINE

## 2019-03-08 PROCEDURE — 85303 CLOT INHIBIT PROT C ACTIVITY: CPT

## 2019-03-08 PROCEDURE — C9113 INJ PANTOPRAZOLE SODIUM, VIA: HCPCS | Performed by: INTERNAL MEDICINE

## 2019-03-08 PROCEDURE — 85613 RUSSELL VIPER VENOM DILUTED: CPT

## 2019-03-08 PROCEDURE — 85610 PROTHROMBIN TIME: CPT

## 2019-03-08 PROCEDURE — 81240 F2 GENE: CPT

## 2019-03-08 PROCEDURE — 85305 CLOT INHIBIT PROT S TOTAL: CPT

## 2019-03-08 PROCEDURE — 99232 SBSQ HOSP IP/OBS MODERATE 35: CPT | Performed by: INTERNAL MEDICINE

## 2019-03-08 PROCEDURE — 2500000003 HC RX 250 WO HCPCS: Performed by: ANESTHESIOLOGY

## 2019-03-08 PROCEDURE — 97530 THERAPEUTIC ACTIVITIES: CPT

## 2019-03-08 PROCEDURE — 1200000000 HC SEMI PRIVATE

## 2019-03-08 PROCEDURE — 85302 CLOT INHIBIT PROT C ANTIGEN: CPT

## 2019-03-08 PROCEDURE — 85018 HEMOGLOBIN: CPT

## 2019-03-08 PROCEDURE — 3700000001 HC ADD 15 MINUTES (ANESTHESIA): Performed by: INTERNAL MEDICINE

## 2019-03-08 PROCEDURE — 0DBL8ZX EXCISION OF TRANSVERSE COLON, VIA NATURAL OR ARTIFICIAL OPENING ENDOSCOPIC, DIAGNOSTIC: ICD-10-PCS | Performed by: INTERNAL MEDICINE

## 2019-03-08 PROCEDURE — 7100000000 HC PACU RECOVERY - FIRST 15 MIN: Performed by: INTERNAL MEDICINE

## 2019-03-08 PROCEDURE — 85025 COMPLETE CBC W/AUTO DIFF WBC: CPT

## 2019-03-08 PROCEDURE — 85730 THROMBOPLASTIN TIME PARTIAL: CPT

## 2019-03-08 PROCEDURE — 2580000003 HC RX 258: Performed by: NURSE ANESTHETIST, CERTIFIED REGISTERED

## 2019-03-08 PROCEDURE — 86147 CARDIOLIPIN ANTIBODY EA IG: CPT

## 2019-03-08 PROCEDURE — 2580000003 HC RX 258: Performed by: ANESTHESIOLOGY

## 2019-03-08 PROCEDURE — 81241 F5 GENE: CPT

## 2019-03-08 PROCEDURE — 7100000001 HC PACU RECOVERY - ADDTL 15 MIN: Performed by: INTERNAL MEDICINE

## 2019-03-08 PROCEDURE — 6360000002 HC RX W HCPCS: Performed by: NURSE ANESTHETIST, CERTIFIED REGISTERED

## 2019-03-08 PROCEDURE — 2720000010 HC SURG SUPPLY STERILE: Performed by: INTERNAL MEDICINE

## 2019-03-08 PROCEDURE — 2709999900 HC NON-CHARGEABLE SUPPLY: Performed by: INTERNAL MEDICINE

## 2019-03-08 PROCEDURE — 3609010400 HC COLONOSCOPY POLYPECTOMY HOT BIOPSY: Performed by: INTERNAL MEDICINE

## 2019-03-08 PROCEDURE — 85635 REPTILASE TEST: CPT

## 2019-03-08 PROCEDURE — 3700000000 HC ANESTHESIA ATTENDED CARE: Performed by: INTERNAL MEDICINE

## 2019-03-08 PROCEDURE — 85670 THROMBIN TIME PLASMA: CPT

## 2019-03-08 PROCEDURE — 86146 BETA-2 GLYCOPROTEIN ANTIBODY: CPT

## 2019-03-08 PROCEDURE — 85014 HEMATOCRIT: CPT

## 2019-03-08 PROCEDURE — 88305 TISSUE EXAM BY PATHOLOGIST: CPT

## 2019-03-08 PROCEDURE — 2580000003 HC RX 258: Performed by: INTERNAL MEDICINE

## 2019-03-08 PROCEDURE — 85306 CLOT INHIBIT PROT S FREE: CPT

## 2019-03-08 RX ORDER — PROPOFOL 10 MG/ML
INJECTION, EMULSION INTRAVENOUS CONTINUOUS PRN
Status: DISCONTINUED | OUTPATIENT
Start: 2019-03-08 | End: 2019-03-08 | Stop reason: SDUPTHER

## 2019-03-08 RX ORDER — ONDANSETRON 2 MG/ML
4 INJECTION INTRAMUSCULAR; INTRAVENOUS
Status: DISCONTINUED | OUTPATIENT
Start: 2019-03-08 | End: 2019-03-08

## 2019-03-08 RX ORDER — MEPERIDINE HYDROCHLORIDE 25 MG/ML
12.5 INJECTION INTRAMUSCULAR; INTRAVENOUS; SUBCUTANEOUS EVERY 5 MIN PRN
Status: DISCONTINUED | OUTPATIENT
Start: 2019-03-08 | End: 2019-03-08

## 2019-03-08 RX ORDER — SODIUM CHLORIDE 0.9 % (FLUSH) 0.9 %
10 SYRINGE (ML) INJECTION EVERY 12 HOURS SCHEDULED
Status: DISCONTINUED | OUTPATIENT
Start: 2019-03-08 | End: 2019-03-12

## 2019-03-08 RX ORDER — MORPHINE SULFATE 2 MG/ML
2 INJECTION, SOLUTION INTRAMUSCULAR; INTRAVENOUS EVERY 5 MIN PRN
Status: DISCONTINUED | OUTPATIENT
Start: 2019-03-08 | End: 2019-03-08

## 2019-03-08 RX ORDER — OXYCODONE HYDROCHLORIDE AND ACETAMINOPHEN 5; 325 MG/1; MG/1
1 TABLET ORAL PRN
Status: DISCONTINUED | OUTPATIENT
Start: 2019-03-08 | End: 2019-03-08

## 2019-03-08 RX ORDER — MORPHINE SULFATE 2 MG/ML
1 INJECTION, SOLUTION INTRAMUSCULAR; INTRAVENOUS EVERY 5 MIN PRN
Status: DISCONTINUED | OUTPATIENT
Start: 2019-03-08 | End: 2019-03-08

## 2019-03-08 RX ORDER — PROPOFOL 10 MG/ML
INJECTION, EMULSION INTRAVENOUS PRN
Status: DISCONTINUED | OUTPATIENT
Start: 2019-03-08 | End: 2019-03-08 | Stop reason: SDUPTHER

## 2019-03-08 RX ORDER — SODIUM CHLORIDE 0.9 % (FLUSH) 0.9 %
10 SYRINGE (ML) INJECTION PRN
Status: DISCONTINUED | OUTPATIENT
Start: 2019-03-08 | End: 2019-03-12

## 2019-03-08 RX ORDER — PANTOPRAZOLE SODIUM 40 MG/1
40 TABLET, DELAYED RELEASE ORAL
Status: DISCONTINUED | OUTPATIENT
Start: 2019-03-09 | End: 2019-03-14 | Stop reason: HOSPADM

## 2019-03-08 RX ORDER — SODIUM CHLORIDE 9 MG/ML
INJECTION, SOLUTION INTRAVENOUS CONTINUOUS
Status: DISCONTINUED | OUTPATIENT
Start: 2019-03-08 | End: 2019-03-11

## 2019-03-08 RX ORDER — LIDOCAINE HYDROCHLORIDE 20 MG/ML
INJECTION, SOLUTION EPIDURAL; INFILTRATION; INTRACAUDAL; PERINEURAL PRN
Status: DISCONTINUED | OUTPATIENT
Start: 2019-03-08 | End: 2019-03-08 | Stop reason: SDUPTHER

## 2019-03-08 RX ORDER — OXYCODONE HYDROCHLORIDE AND ACETAMINOPHEN 5; 325 MG/1; MG/1
2 TABLET ORAL PRN
Status: DISCONTINUED | OUTPATIENT
Start: 2019-03-08 | End: 2019-03-08

## 2019-03-08 RX ORDER — SODIUM CHLORIDE 9 MG/ML
INJECTION, SOLUTION INTRAVENOUS CONTINUOUS PRN
Status: DISCONTINUED | OUTPATIENT
Start: 2019-03-08 | End: 2019-03-08 | Stop reason: SDUPTHER

## 2019-03-08 RX ORDER — FENTANYL CITRATE 50 UG/ML
25 INJECTION, SOLUTION INTRAMUSCULAR; INTRAVENOUS EVERY 5 MIN PRN
Status: DISCONTINUED | OUTPATIENT
Start: 2019-03-08 | End: 2019-03-08

## 2019-03-08 RX ORDER — FENTANYL CITRATE 50 UG/ML
50 INJECTION, SOLUTION INTRAMUSCULAR; INTRAVENOUS EVERY 5 MIN PRN
Status: DISCONTINUED | OUTPATIENT
Start: 2019-03-08 | End: 2019-03-08

## 2019-03-08 RX ADMIN — PROPOFOL 40 MG: 10 INJECTION, EMULSION INTRAVENOUS at 14:05

## 2019-03-08 RX ADMIN — PHENYLEPHRINE HYDROCHLORIDE 100 MCG: 10 INJECTION, SOLUTION INTRAMUSCULAR; INTRAVENOUS; SUBCUTANEOUS at 14:18

## 2019-03-08 RX ADMIN — LIDOCAINE HYDROCHLORIDE 60 MG: 20 INJECTION, SOLUTION EPIDURAL; INFILTRATION; INTRACAUDAL; PERINEURAL at 14:05

## 2019-03-08 RX ADMIN — PANTOPRAZOLE SODIUM 40 MG: 40 INJECTION, POWDER, FOR SOLUTION INTRAVENOUS at 10:10

## 2019-03-08 RX ADMIN — DIBASIC SODIUM PHOSPHATE, MONOBASIC POTASSIUM PHOSPHATE AND MONOBASIC SODIUM PHOSPHATE 1 TABLET: 852; 155; 130 TABLET ORAL at 10:08

## 2019-03-08 RX ADMIN — BUPRENORPHINE HYDROCHLORIDE, NALOXONE HYDROCHLORIDE 0.5 FILM: 8; 2 FILM, SOLUBLE BUCCAL; SUBLINGUAL at 21:21

## 2019-03-08 RX ADMIN — SODIUM CHLORIDE: 9 INJECTION, SOLUTION INTRAVENOUS at 13:56

## 2019-03-08 RX ADMIN — DIBASIC SODIUM PHOSPHATE, MONOBASIC POTASSIUM PHOSPHATE AND MONOBASIC SODIUM PHOSPHATE 1 TABLET: 852; 155; 130 TABLET ORAL at 21:21

## 2019-03-08 RX ADMIN — PHENYLEPHRINE HYDROCHLORIDE 100 MCG: 10 INJECTION, SOLUTION INTRAMUSCULAR; INTRAVENOUS; SUBCUTANEOUS at 14:21

## 2019-03-08 RX ADMIN — PROPOFOL 140 MCG/KG/MIN: 10 INJECTION, EMULSION INTRAVENOUS at 14:05

## 2019-03-08 RX ADMIN — THIAMINE HYDROCHLORIDE 100 MG: 100 INJECTION, SOLUTION INTRAMUSCULAR; INTRAVENOUS at 10:27

## 2019-03-08 RX ADMIN — Medication 15 ML: at 09:47

## 2019-03-08 RX ADMIN — Medication 1 TABLET: at 21:21

## 2019-03-08 RX ADMIN — BUPRENORPHINE HYDROCHLORIDE, NALOXONE HYDROCHLORIDE 0.5 FILM: 8; 2 FILM, SOLUBLE BUCCAL; SUBLINGUAL at 10:07

## 2019-03-08 RX ADMIN — CIPROFLOXACIN 400 MG: 2 INJECTION, SOLUTION INTRAVENOUS at 21:20

## 2019-03-08 RX ADMIN — SODIUM CHLORIDE: 9 INJECTION, SOLUTION INTRAVENOUS at 16:37

## 2019-03-08 RX ADMIN — PHENYLEPHRINE HYDROCHLORIDE 200 MCG: 10 INJECTION, SOLUTION INTRAMUSCULAR; INTRAVENOUS; SUBCUTANEOUS at 14:09

## 2019-03-08 RX ADMIN — CIPROFLOXACIN 400 MG: 2 INJECTION, SOLUTION INTRAVENOUS at 10:09

## 2019-03-08 RX ADMIN — Medication 1 TABLET: at 10:09

## 2019-03-08 RX ADMIN — FAMOTIDINE 20 MG: 10 INJECTION, SOLUTION INTRAVENOUS at 18:08

## 2019-03-08 ASSESSMENT — PULMONARY FUNCTION TESTS
PIF_VALUE: 0

## 2019-03-08 ASSESSMENT — PAIN SCALES - GENERAL
PAINLEVEL_OUTOF10: 0

## 2019-03-08 ASSESSMENT — LIFESTYLE VARIABLES: SMOKING_STATUS: 0

## 2019-03-08 ASSESSMENT — PAIN - FUNCTIONAL ASSESSMENT: PAIN_FUNCTIONAL_ASSESSMENT: 0-10

## 2019-03-09 ENCOUNTER — APPOINTMENT (OUTPATIENT)
Dept: CT IMAGING | Age: 64
DRG: 811 | End: 2019-03-09
Payer: MEDICARE

## 2019-03-09 LAB
ANION GAP SERPL CALCULATED.3IONS-SCNC: 12 MMOL/L (ref 3–16)
APTT: 59.4 SEC (ref 26–36)
BACTERIA: ABNORMAL /HPF
BASOPHILS ABSOLUTE: 0.1 K/UL (ref 0–0.2)
BASOPHILS RELATIVE PERCENT: 1.2 %
BILIRUBIN URINE: ABNORMAL
BLOOD, URINE: NEGATIVE
BUN BLDV-MCNC: 2 MG/DL (ref 7–20)
CALCIUM SERPL-MCNC: 7 MG/DL (ref 8.3–10.6)
CASTS: ABNORMAL /LPF
CHLORIDE BLD-SCNC: 103 MMOL/L (ref 99–110)
CLARITY: ABNORMAL
CO2: 21 MMOL/L (ref 21–32)
COLOR: ABNORMAL
COMMENT UA: ABNORMAL
CREAT SERPL-MCNC: <0.5 MG/DL (ref 0.6–1.2)
CRYSTALS, UA: ABNORMAL /HPF
EOSINOPHILS ABSOLUTE: 0.1 K/UL (ref 0–0.6)
EOSINOPHILS RELATIVE PERCENT: 2.2 %
EPITHELIAL CELLS, UA: 1 /HPF (ref 0–5)
GFR AFRICAN AMERICAN: >60
GFR NON-AFRICAN AMERICAN: >60
GLUCOSE BLD-MCNC: 147 MG/DL (ref 70–99)
GLUCOSE URINE: NEGATIVE MG/DL
HCT VFR BLD CALC: 29.6 % (ref 36–48)
HEMOGLOBIN: 9.1 G/DL (ref 12–16)
KETONES, URINE: NEGATIVE MG/DL
LEUKOCYTE ESTERASE, URINE: NEGATIVE
LYMPHOCYTES ABSOLUTE: 0.9 K/UL (ref 1–5.1)
LYMPHOCYTES RELATIVE PERCENT: 14.5 %
MAGNESIUM: 1.7 MG/DL (ref 1.8–2.4)
MCH RBC QN AUTO: 26.7 PG (ref 26–34)
MCHC RBC AUTO-ENTMCNC: 30.9 G/DL (ref 31–36)
MCV RBC AUTO: 86.3 FL (ref 80–100)
MICROSCOPIC EXAMINATION: YES
MONOCYTES ABSOLUTE: 0.4 K/UL (ref 0–1.3)
MONOCYTES RELATIVE PERCENT: 6.4 %
NEUTROPHILS ABSOLUTE: 4.5 K/UL (ref 1.7–7.7)
NEUTROPHILS RELATIVE PERCENT: 75.7 %
NITRITE, URINE: NEGATIVE
PDW BLD-RTO: 24.6 % (ref 12.4–15.4)
PH UA: 5.5 (ref 5–8)
PHOSPHORUS: 2.4 MG/DL (ref 2.5–4.9)
PLATELET # BLD: 61 K/UL (ref 135–450)
PLATELET # BLD: 65 K/UL (ref 135–450)
PMV BLD AUTO: 8.5 FL (ref 5–10.5)
POTASSIUM SERPL-SCNC: 2.8 MMOL/L (ref 3.5–5.1)
PROTEIN UA: 30 MG/DL
RBC # BLD: 3.42 M/UL (ref 4–5.2)
RBC UA: ABNORMAL /HPF (ref 0–2)
REASON FOR REJECTION: NORMAL
REJECTED TEST: NORMAL
SODIUM BLD-SCNC: 136 MMOL/L (ref 136–145)
SPECIFIC GRAVITY UA: 1.03 (ref 1–1.03)
T4 FREE: 0.7 NG/DL (ref 0.9–1.8)
URINE TYPE: ABNORMAL
UROBILINOGEN, URINE: 0.2 E.U./DL
WBC # BLD: 6 K/UL (ref 4–11)
WBC UA: 5 /HPF (ref 0–5)
YEAST: PRESENT /HPF

## 2019-03-09 PROCEDURE — 2580000003 HC RX 258: Performed by: INTERNAL MEDICINE

## 2019-03-09 PROCEDURE — 84439 ASSAY OF FREE THYROXINE: CPT

## 2019-03-09 PROCEDURE — 6360000002 HC RX W HCPCS: Performed by: INTERNAL MEDICINE

## 2019-03-09 PROCEDURE — 85730 THROMBOPLASTIN TIME PARTIAL: CPT

## 2019-03-09 PROCEDURE — 51798 US URINE CAPACITY MEASURE: CPT

## 2019-03-09 PROCEDURE — 1200000000 HC SEMI PRIVATE

## 2019-03-09 PROCEDURE — 81001 URINALYSIS AUTO W/SCOPE: CPT

## 2019-03-09 PROCEDURE — 6370000000 HC RX 637 (ALT 250 FOR IP): Performed by: INTERNAL MEDICINE

## 2019-03-09 PROCEDURE — 84100 ASSAY OF PHOSPHORUS: CPT

## 2019-03-09 PROCEDURE — 2580000003 HC RX 258: Performed by: ANESTHESIOLOGY

## 2019-03-09 PROCEDURE — 74176 CT ABD & PELVIS W/O CONTRAST: CPT

## 2019-03-09 PROCEDURE — 6370000000 HC RX 637 (ALT 250 FOR IP): Performed by: FAMILY MEDICINE

## 2019-03-09 PROCEDURE — 83516 IMMUNOASSAY NONANTIBODY: CPT

## 2019-03-09 PROCEDURE — 83735 ASSAY OF MAGNESIUM: CPT

## 2019-03-09 PROCEDURE — 6360000002 HC RX W HCPCS: Performed by: FAMILY MEDICINE

## 2019-03-09 PROCEDURE — 6360000002 HC RX W HCPCS: Performed by: HOSPITALIST

## 2019-03-09 PROCEDURE — 2700000000 HC OXYGEN THERAPY PER DAY

## 2019-03-09 PROCEDURE — 85049 AUTOMATED PLATELET COUNT: CPT

## 2019-03-09 PROCEDURE — 51702 INSERT TEMP BLADDER CATH: CPT

## 2019-03-09 PROCEDURE — 80048 BASIC METABOLIC PNL TOTAL CA: CPT

## 2019-03-09 PROCEDURE — 85025 COMPLETE CBC W/AUTO DIFF WBC: CPT

## 2019-03-09 PROCEDURE — 83036 HEMOGLOBIN GLYCOSYLATED A1C: CPT

## 2019-03-09 PROCEDURE — 94760 N-INVAS EAR/PLS OXIMETRY 1: CPT

## 2019-03-09 PROCEDURE — 86022 PLATELET ANTIBODIES: CPT

## 2019-03-09 RX ORDER — POTASSIUM CHLORIDE 7.45 MG/ML
10 INJECTION INTRAVENOUS PRN
Status: DISCONTINUED | OUTPATIENT
Start: 2019-03-09 | End: 2019-03-09

## 2019-03-09 RX ORDER — ARGATROBAN 1 MG/ML
2 INJECTION, SOLUTION INTRAVENOUS CONTINUOUS
Status: DISCONTINUED | OUTPATIENT
Start: 2019-03-09 | End: 2019-03-09

## 2019-03-09 RX ORDER — MAGNESIUM SULFATE IN WATER 40 MG/ML
4 INJECTION, SOLUTION INTRAVENOUS ONCE
Status: COMPLETED | OUTPATIENT
Start: 2019-03-09 | End: 2019-03-09

## 2019-03-09 RX ORDER — FLUCONAZOLE 2 MG/ML
100 INJECTION, SOLUTION INTRAVENOUS EVERY 24 HOURS
Status: DISCONTINUED | OUTPATIENT
Start: 2019-03-09 | End: 2019-03-09 | Stop reason: SDUPTHER

## 2019-03-09 RX ORDER — POTASSIUM CHLORIDE 20 MEQ/1
40 TABLET, EXTENDED RELEASE ORAL ONCE
Status: DISCONTINUED | OUTPATIENT
Start: 2019-03-09 | End: 2019-03-09

## 2019-03-09 RX ORDER — ARGATROBAN 1 MG/ML
2 INJECTION, SOLUTION INTRAVENOUS CONTINUOUS
Status: DISCONTINUED | OUTPATIENT
Start: 2019-03-09 | End: 2019-03-09 | Stop reason: SDUPTHER

## 2019-03-09 RX ORDER — POTASSIUM CHLORIDE 20 MEQ/1
40 TABLET, EXTENDED RELEASE ORAL PRN
Status: DISCONTINUED | OUTPATIENT
Start: 2019-03-09 | End: 2019-03-09

## 2019-03-09 RX ORDER — POTASSIUM CHLORIDE 7.45 MG/ML
10 INJECTION INTRAVENOUS
Status: DISCONTINUED | OUTPATIENT
Start: 2019-03-09 | End: 2019-03-09

## 2019-03-09 RX ADMIN — DIBASIC SODIUM PHOSPHATE, MONOBASIC POTASSIUM PHOSPHATE AND MONOBASIC SODIUM PHOSPHATE 1 TABLET: 852; 155; 130 TABLET ORAL at 10:22

## 2019-03-09 RX ADMIN — BUPRENORPHINE HYDROCHLORIDE, NALOXONE HYDROCHLORIDE 0.5 FILM: 8; 2 FILM, SOLUBLE BUCCAL; SUBLINGUAL at 15:41

## 2019-03-09 RX ADMIN — POTASSIUM BICARBONATE 40 MEQ: 782 TABLET, EFFERVESCENT ORAL at 16:29

## 2019-03-09 RX ADMIN — IRON SUCROSE 200 MG: 20 INJECTION, SOLUTION INTRAVENOUS at 17:24

## 2019-03-09 RX ADMIN — POTASSIUM BICARBONATE 40 MEQ: 782 TABLET, EFFERVESCENT ORAL at 21:11

## 2019-03-09 RX ADMIN — Medication 1 TABLET: at 10:17

## 2019-03-09 RX ADMIN — FLUCONAZOLE, SODIUM CHLORIDE 100 MG: 2 INJECTION INTRAVENOUS at 23:03

## 2019-03-09 RX ADMIN — POTASSIUM CHLORIDE 10 MEQ: 7.46 INJECTION, SOLUTION INTRAVENOUS at 06:41

## 2019-03-09 RX ADMIN — Medication 15 ML: at 19:14

## 2019-03-09 RX ADMIN — PANTOPRAZOLE SODIUM 40 MG: 40 TABLET, DELAYED RELEASE ORAL at 06:41

## 2019-03-09 RX ADMIN — THIAMINE HYDROCHLORIDE 100 MG: 100 INJECTION, SOLUTION INTRAMUSCULAR; INTRAVENOUS at 16:25

## 2019-03-09 RX ADMIN — SODIUM CHLORIDE: 9 INJECTION, SOLUTION INTRAVENOUS at 02:28

## 2019-03-09 RX ADMIN — DIBASIC SODIUM PHOSPHATE, MONOBASIC POTASSIUM PHOSPHATE AND MONOBASIC SODIUM PHOSPHATE 1 TABLET: 852; 155; 130 TABLET ORAL at 21:12

## 2019-03-09 RX ADMIN — PANCRELIPASE 2 CAPSULE: 24000; 76000; 120000 CAPSULE, DELAYED RELEASE PELLETS ORAL at 11:30

## 2019-03-09 RX ADMIN — POTASSIUM CHLORIDE 10 MEQ: 7.46 INJECTION, SOLUTION INTRAVENOUS at 08:11

## 2019-03-09 RX ADMIN — Medication 1 TABLET: at 16:28

## 2019-03-09 RX ADMIN — MAGNESIUM SULFATE HEPTAHYDRATE 4 G: 40 INJECTION, SOLUTION INTRAVENOUS at 11:31

## 2019-03-09 RX ADMIN — Medication 1 TABLET: at 21:13

## 2019-03-09 RX ADMIN — ACETAMINOPHEN 650 MG: 325 TABLET, FILM COATED ORAL at 21:13

## 2019-03-09 RX ADMIN — CIPROFLOXACIN 400 MG: 2 INJECTION, SOLUTION INTRAVENOUS at 21:14

## 2019-03-09 RX ADMIN — BUPRENORPHINE HYDROCHLORIDE, NALOXONE HYDROCHLORIDE 0.5 FILM: 8; 2 FILM, SOLUBLE BUCCAL; SUBLINGUAL at 21:10

## 2019-03-09 RX ADMIN — BUPRENORPHINE HYDROCHLORIDE, NALOXONE HYDROCHLORIDE 0.5 FILM: 8; 2 FILM, SOLUBLE BUCCAL; SUBLINGUAL at 10:36

## 2019-03-09 RX ADMIN — DEXTROSE MONOHYDRATE 2 MCG/KG/MIN: 50 INJECTION, SOLUTION INTRAVENOUS at 16:01

## 2019-03-09 RX ADMIN — Medication 10 ML: at 21:19

## 2019-03-09 RX ADMIN — PANCRELIPASE 2 CAPSULE: 24000; 76000; 120000 CAPSULE, DELAYED RELEASE PELLETS ORAL at 16:28

## 2019-03-09 RX ADMIN — CIPROFLOXACIN 400 MG: 2 INJECTION, SOLUTION INTRAVENOUS at 10:17

## 2019-03-09 ASSESSMENT — PAIN SCALES - GENERAL
PAINLEVEL_OUTOF10: 0
PAINLEVEL_OUTOF10: 5
PAINLEVEL_OUTOF10: 9
PAINLEVEL_OUTOF10: 0
PAINLEVEL_OUTOF10: 0

## 2019-03-10 LAB
ALBUMIN SERPL-MCNC: 1.6 G/DL (ref 3.4–5)
ALP BLD-CCNC: 115 U/L (ref 40–129)
ALT SERPL-CCNC: 8 U/L (ref 10–40)
ANION GAP SERPL CALCULATED.3IONS-SCNC: 9 MMOL/L (ref 3–16)
ANISOCYTOSIS: ABNORMAL
ANTICARDIOLIPIN IGG ANTIBODY: 0 GPL (ref 0–14)
APTT: 74.7 SEC (ref 26–36)
APTT: >248 SEC (ref 26–36)
AST SERPL-CCNC: 19 U/L (ref 15–37)
BASOPHILS ABSOLUTE: 0 K/UL (ref 0–0.2)
BASOPHILS ABSOLUTE: 0 K/UL (ref 0–0.2)
BASOPHILS RELATIVE PERCENT: 0.7 %
BASOPHILS RELATIVE PERCENT: 1.1 %
BETA-2 GLYCOPROTEIN 1 IGG ANTIBODY: 0 SGU (ref 0–20)
BETA-2 GLYCOPROTEIN 1 IGM ANTIBODY: 3 SMU (ref 0–20)
BILIRUB SERPL-MCNC: 0.9 MG/DL (ref 0–1)
BILIRUBIN DIRECT: 0.5 MG/DL (ref 0–0.3)
BILIRUBIN, INDIRECT: 0.4 MG/DL (ref 0–1)
BUN BLDV-MCNC: <2 MG/DL (ref 7–20)
CALCIUM SERPL-MCNC: 7.3 MG/DL (ref 8.3–10.6)
CARDIOLIPIN AB IGM: 21 MPL (ref 0–12)
CHLORIDE BLD-SCNC: 105 MMOL/L (ref 99–110)
CO2: 22 MMOL/L (ref 21–32)
CREAT SERPL-MCNC: <0.5 MG/DL (ref 0.6–1.2)
EOSINOPHILS ABSOLUTE: 0.1 K/UL (ref 0–0.6)
EOSINOPHILS ABSOLUTE: 0.2 K/UL (ref 0–0.6)
EOSINOPHILS RELATIVE PERCENT: 2.5 %
EOSINOPHILS RELATIVE PERCENT: 3 %
ESTIMATED AVERAGE GLUCOSE: 108.3 MG/DL
GFR AFRICAN AMERICAN: >60
GFR NON-AFRICAN AMERICAN: >60
GLUCOSE BLD-MCNC: 97 MG/DL (ref 70–99)
HBA1C MFR BLD: 5.4 %
HCT VFR BLD CALC: 24.3 % (ref 36–48)
HCT VFR BLD CALC: 27.3 % (ref 36–48)
HEMOGLOBIN: 7.5 G/DL (ref 12–16)
HEMOGLOBIN: 8.4 G/DL (ref 12–16)
HEPARIN INDUCED PLATELET ANTIBODY: NEGATIVE
HYPOCHROMIA: ABNORMAL
LYMPHOCYTES ABSOLUTE: 0.6 K/UL (ref 1–5.1)
LYMPHOCYTES ABSOLUTE: 0.8 K/UL (ref 1–5.1)
LYMPHOCYTES RELATIVE PERCENT: 12.8 %
LYMPHOCYTES RELATIVE PERCENT: 18 %
MAGNESIUM: 2.3 MG/DL (ref 1.8–2.4)
MCH RBC QN AUTO: 26.6 PG (ref 26–34)
MCH RBC QN AUTO: 27 PG (ref 26–34)
MCHC RBC AUTO-ENTMCNC: 30.6 G/DL (ref 31–36)
MCHC RBC AUTO-ENTMCNC: 31 G/DL (ref 31–36)
MCV RBC AUTO: 87 FL (ref 80–100)
MCV RBC AUTO: 87 FL (ref 80–100)
MONOCYTES ABSOLUTE: 0.3 K/UL (ref 0–1.3)
MONOCYTES ABSOLUTE: 0.3 K/UL (ref 0–1.3)
MONOCYTES RELATIVE PERCENT: 6.2 %
MONOCYTES RELATIVE PERCENT: 6.4 %
NEUTROPHILS ABSOLUTE: 3.1 K/UL (ref 1.7–7.7)
NEUTROPHILS ABSOLUTE: 3.9 K/UL (ref 1.7–7.7)
NEUTROPHILS RELATIVE PERCENT: 72 %
NEUTROPHILS RELATIVE PERCENT: 77.3 %
PDW BLD-RTO: 24.5 % (ref 12.4–15.4)
PDW BLD-RTO: 24.9 % (ref 12.4–15.4)
PHOSPHORUS: 3.2 MG/DL (ref 2.5–4.9)
PLATELET # BLD: 49 K/UL (ref 135–450)
PLATELET # BLD: 55 K/UL (ref 135–450)
PLATELET SLIDE REVIEW: ABNORMAL
PMV BLD AUTO: 8.4 FL (ref 5–10.5)
PMV BLD AUTO: 8.7 FL (ref 5–10.5)
POLYCHROMASIA: ABNORMAL
POTASSIUM SERPL-SCNC: 3.4 MMOL/L (ref 3.5–5.1)
PROTEIN S ANTIGEN, TOTAL: 66 % (ref 63–126)
RBC # BLD: 2.79 M/UL (ref 4–5.2)
RBC # BLD: 3.15 M/UL (ref 4–5.2)
REASON FOR REJECTION: NORMAL
REJECTED TEST: NORMAL
SLIDE REVIEW: ABNORMAL
SODIUM BLD-SCNC: 136 MMOL/L (ref 136–145)
TOTAL PROTEIN: 3.8 G/DL (ref 6.4–8.2)
WBC # BLD: 4.3 K/UL (ref 4–11)
WBC # BLD: 5 K/UL (ref 4–11)

## 2019-03-10 PROCEDURE — 6360000002 HC RX W HCPCS: Performed by: INTERNAL MEDICINE

## 2019-03-10 PROCEDURE — 1200000000 HC SEMI PRIVATE

## 2019-03-10 PROCEDURE — 6370000000 HC RX 637 (ALT 250 FOR IP): Performed by: INTERNAL MEDICINE

## 2019-03-10 PROCEDURE — 2580000003 HC RX 258: Performed by: INTERNAL MEDICINE

## 2019-03-10 PROCEDURE — 6360000002 HC RX W HCPCS: Performed by: FAMILY MEDICINE

## 2019-03-10 PROCEDURE — 6370000000 HC RX 637 (ALT 250 FOR IP): Performed by: FAMILY MEDICINE

## 2019-03-10 PROCEDURE — 80048 BASIC METABOLIC PNL TOTAL CA: CPT

## 2019-03-10 PROCEDURE — P9046 ALBUMIN (HUMAN), 25%, 20 ML: HCPCS | Performed by: INTERNAL MEDICINE

## 2019-03-10 PROCEDURE — 80076 HEPATIC FUNCTION PANEL: CPT

## 2019-03-10 PROCEDURE — 83735 ASSAY OF MAGNESIUM: CPT

## 2019-03-10 PROCEDURE — 85025 COMPLETE CBC W/AUTO DIFF WBC: CPT

## 2019-03-10 PROCEDURE — 84100 ASSAY OF PHOSPHORUS: CPT

## 2019-03-10 PROCEDURE — 36415 COLL VENOUS BLD VENIPUNCTURE: CPT

## 2019-03-10 PROCEDURE — 85730 THROMBOPLASTIN TIME PARTIAL: CPT

## 2019-03-10 PROCEDURE — 2580000003 HC RX 258: Performed by: ANESTHESIOLOGY

## 2019-03-10 RX ORDER — POTASSIUM CHLORIDE 750 MG/1
40 TABLET, FILM COATED, EXTENDED RELEASE ORAL ONCE
Status: COMPLETED | OUTPATIENT
Start: 2019-03-10 | End: 2019-03-10

## 2019-03-10 RX ORDER — SODIUM CHLORIDE 9 MG/ML
INJECTION, SOLUTION INTRAVENOUS CONTINUOUS
Status: DISCONTINUED | OUTPATIENT
Start: 2019-03-10 | End: 2019-03-11

## 2019-03-10 RX ORDER — POTASSIUM CHLORIDE 20 MEQ/1
40 TABLET, EXTENDED RELEASE ORAL ONCE
Status: DISCONTINUED | OUTPATIENT
Start: 2019-03-10 | End: 2019-03-10

## 2019-03-10 RX ORDER — FUROSEMIDE 10 MG/ML
40 INJECTION INTRAMUSCULAR; INTRAVENOUS 2 TIMES DAILY
Status: DISCONTINUED | OUTPATIENT
Start: 2019-03-10 | End: 2019-03-14 | Stop reason: HOSPADM

## 2019-03-10 RX ORDER — FUROSEMIDE 10 MG/ML
40 INJECTION INTRAMUSCULAR; INTRAVENOUS ONCE
Status: COMPLETED | OUTPATIENT
Start: 2019-03-10 | End: 2019-03-10

## 2019-03-10 RX ORDER — 0.9 % SODIUM CHLORIDE 0.9 %
500 INTRAVENOUS SOLUTION INTRAVENOUS ONCE
Status: COMPLETED | OUTPATIENT
Start: 2019-03-10 | End: 2019-03-10

## 2019-03-10 RX ORDER — ALBUMIN (HUMAN) 12.5 G/50ML
25 SOLUTION INTRAVENOUS EVERY 8 HOURS
Status: COMPLETED | OUTPATIENT
Start: 2019-03-10 | End: 2019-03-11

## 2019-03-10 RX ADMIN — Medication 15 ML: at 12:39

## 2019-03-10 RX ADMIN — ALBUMIN (HUMAN) 25 G: 0.25 INJECTION, SOLUTION INTRAVENOUS at 18:08

## 2019-03-10 RX ADMIN — SODIUM CHLORIDE 500 ML: 9 INJECTION, SOLUTION INTRAVENOUS at 08:51

## 2019-03-10 RX ADMIN — BUPRENORPHINE HYDROCHLORIDE, NALOXONE HYDROCHLORIDE 0.5 FILM: 8; 2 FILM, SOLUBLE BUCCAL; SUBLINGUAL at 15:34

## 2019-03-10 RX ADMIN — Medication 10 ML: at 21:34

## 2019-03-10 RX ADMIN — PANCRELIPASE 2 CAPSULE: 24000; 76000; 120000 CAPSULE, DELAYED RELEASE PELLETS ORAL at 12:39

## 2019-03-10 RX ADMIN — CIPROFLOXACIN 400 MG: 2 INJECTION, SOLUTION INTRAVENOUS at 14:26

## 2019-03-10 RX ADMIN — POTASSIUM BICARBONATE 40 MEQ: 782 TABLET, EFFERVESCENT ORAL at 21:33

## 2019-03-10 RX ADMIN — SODIUM CHLORIDE: 9 INJECTION, SOLUTION INTRAVENOUS at 09:59

## 2019-03-10 RX ADMIN — Medication 1 TABLET: at 12:38

## 2019-03-10 RX ADMIN — DIBASIC SODIUM PHOSPHATE, MONOBASIC POTASSIUM PHOSPHATE AND MONOBASIC SODIUM PHOSPHATE 1 TABLET: 852; 155; 130 TABLET ORAL at 12:39

## 2019-03-10 RX ADMIN — THIAMINE HYDROCHLORIDE 100 MG: 100 INJECTION, SOLUTION INTRAMUSCULAR; INTRAVENOUS at 12:00

## 2019-03-10 RX ADMIN — ALBUMIN (HUMAN) 25 G: 0.25 INJECTION, SOLUTION INTRAVENOUS at 10:31

## 2019-03-10 RX ADMIN — DEXTROSE 1 MCG/KG/MIN: 5 SOLUTION INTRAVENOUS at 12:25

## 2019-03-10 RX ADMIN — BUPRENORPHINE HYDROCHLORIDE, NALOXONE HYDROCHLORIDE 0.5 FILM: 8; 2 FILM, SOLUBLE BUCCAL; SUBLINGUAL at 21:33

## 2019-03-10 RX ADMIN — POTASSIUM CHLORIDE 40 MEQ: 750 TABLET, EXTENDED RELEASE ORAL at 17:49

## 2019-03-10 RX ADMIN — PANTOPRAZOLE SODIUM 40 MG: 40 TABLET, DELAYED RELEASE ORAL at 06:30

## 2019-03-10 RX ADMIN — DIBASIC SODIUM PHOSPHATE, MONOBASIC POTASSIUM PHOSPHATE AND MONOBASIC SODIUM PHOSPHATE 1 TABLET: 852; 155; 130 TABLET ORAL at 21:33

## 2019-03-10 RX ADMIN — FLUCONAZOLE, SODIUM CHLORIDE 100 MG: 2 INJECTION INTRAVENOUS at 23:53

## 2019-03-10 RX ADMIN — FUROSEMIDE 40 MG: 10 INJECTION, SOLUTION INTRAMUSCULAR; INTRAVENOUS at 14:01

## 2019-03-10 RX ADMIN — PANCRELIPASE 2 CAPSULE: 24000; 76000; 120000 CAPSULE, DELAYED RELEASE PELLETS ORAL at 18:31

## 2019-03-10 RX ADMIN — Medication 1 TABLET: at 21:34

## 2019-03-10 RX ADMIN — IRON SUCROSE 200 MG: 20 INJECTION, SOLUTION INTRAVENOUS at 12:34

## 2019-03-10 RX ADMIN — CIPROFLOXACIN 400 MG: 2 INJECTION, SOLUTION INTRAVENOUS at 21:36

## 2019-03-10 RX ADMIN — BUPRENORPHINE HYDROCHLORIDE, NALOXONE HYDROCHLORIDE 0.5 FILM: 8; 2 FILM, SOLUBLE BUCCAL; SUBLINGUAL at 09:50

## 2019-03-10 RX ADMIN — FUROSEMIDE 40 MG: 10 INJECTION, SOLUTION INTRAMUSCULAR; INTRAVENOUS at 18:06

## 2019-03-10 ASSESSMENT — PAIN DESCRIPTION - ORIENTATION: ORIENTATION: RIGHT;LEFT

## 2019-03-10 ASSESSMENT — PAIN SCALES - GENERAL
PAINLEVEL_OUTOF10: 0
PAINLEVEL_OUTOF10: 0
PAINLEVEL_OUTOF10: 8
PAINLEVEL_OUTOF10: 0

## 2019-03-10 ASSESSMENT — PAIN DESCRIPTION - PAIN TYPE: TYPE: ACUTE PAIN

## 2019-03-10 ASSESSMENT — PAIN DESCRIPTION - ONSET: ONSET: ON-GOING

## 2019-03-10 ASSESSMENT — PAIN DESCRIPTION - PROGRESSION: CLINICAL_PROGRESSION: GRADUALLY WORSENING

## 2019-03-10 ASSESSMENT — PAIN DESCRIPTION - FREQUENCY: FREQUENCY: CONTINUOUS

## 2019-03-10 ASSESSMENT — PAIN DESCRIPTION - DESCRIPTORS: DESCRIPTORS: PRESSURE

## 2019-03-10 ASSESSMENT — PAIN - FUNCTIONAL ASSESSMENT: PAIN_FUNCTIONAL_ASSESSMENT: PREVENTS OR INTERFERES WITH ALL ACTIVE AND SOME PASSIVE ACTIVITIES

## 2019-03-10 ASSESSMENT — PAIN DESCRIPTION - LOCATION: LOCATION: ABDOMEN

## 2019-03-11 LAB
ABO/RH: NORMAL
ANION GAP SERPL CALCULATED.3IONS-SCNC: 10 MMOL/L (ref 3–16)
ANTIBODY SCREEN: NORMAL
APTT: 103.8 SEC (ref 26–36)
APTT: 104.4 SEC (ref 26–36)
APTT: 52.7 SEC (ref 26–36)
APTT: 91.8 SEC (ref 26–36)
APTT: 98 SEC (ref 26–36)
BASOPHILS ABSOLUTE: 0 K/UL (ref 0–0.2)
BASOPHILS ABSOLUTE: 0.1 K/UL (ref 0–0.2)
BASOPHILS RELATIVE PERCENT: 0.8 %
BASOPHILS RELATIVE PERCENT: 1 %
BLOOD BANK DISPENSE STATUS: NORMAL
BLOOD BANK PRODUCT CODE: NORMAL
BPU ID: NORMAL
BUN BLDV-MCNC: 2 MG/DL (ref 7–20)
CALCIUM SERPL-MCNC: 7.9 MG/DL (ref 8.3–10.6)
CHLORIDE BLD-SCNC: 102 MMOL/L (ref 99–110)
CO2: 25 MMOL/L (ref 21–32)
CREAT SERPL-MCNC: <0.5 MG/DL (ref 0.6–1.2)
DESCRIPTION BLOOD BANK: NORMAL
DRVVT CONFIRMATION TEST: ABNORMAL
DRVVT SCREEN: 32 SEC (ref 33–44)
DRVVT,DIL: ABNORMAL SEC (ref 33–44)
EOSINOPHILS ABSOLUTE: 0.1 K/UL (ref 0–0.6)
EOSINOPHILS ABSOLUTE: 0.2 K/UL (ref 0–0.6)
EOSINOPHILS RELATIVE PERCENT: 2.7 %
EOSINOPHILS RELATIVE PERCENT: 2.8 %
FACTOR V LEIDEN: NEGATIVE
GFR AFRICAN AMERICAN: >60
GFR NON-AFRICAN AMERICAN: >60
GLUCOSE BLD-MCNC: 93 MG/DL (ref 70–99)
HCT VFR BLD CALC: 23.5 % (ref 36–48)
HCT VFR BLD CALC: 33.2 % (ref 36–48)
HEMOGLOBIN: 10.8 G/DL (ref 12–16)
HEMOGLOBIN: 7.3 G/DL (ref 12–16)
HEXAGONAL PHOSPHOLIPID NEUTRALIZAT TEST: ABNORMAL
LUPUS ANTICOAG INTERP: ABNORMAL
LYMPHOCYTES ABSOLUTE: 0.8 K/UL (ref 1–5.1)
LYMPHOCYTES ABSOLUTE: 1 K/UL (ref 1–5.1)
LYMPHOCYTES RELATIVE PERCENT: 17 %
LYMPHOCYTES RELATIVE PERCENT: 18.3 %
MAGNESIUM: 1.8 MG/DL (ref 1.8–2.4)
MCH RBC QN AUTO: 27.5 PG (ref 26–34)
MCH RBC QN AUTO: 28.9 PG (ref 26–34)
MCHC RBC AUTO-ENTMCNC: 30.9 G/DL (ref 31–36)
MCHC RBC AUTO-ENTMCNC: 32.5 G/DL (ref 31–36)
MCV RBC AUTO: 88.7 FL (ref 80–100)
MCV RBC AUTO: 89 FL (ref 80–100)
MONOCYTES ABSOLUTE: 0.3 K/UL (ref 0–1.3)
MONOCYTES ABSOLUTE: 0.4 K/UL (ref 0–1.3)
MONOCYTES RELATIVE PERCENT: 6.5 %
MONOCYTES RELATIVE PERCENT: 7.8 %
NEUTROPHILS ABSOLUTE: 3.3 K/UL (ref 1.7–7.7)
NEUTROPHILS ABSOLUTE: 3.8 K/UL (ref 1.7–7.7)
NEUTROPHILS RELATIVE PERCENT: 70.1 %
NEUTROPHILS RELATIVE PERCENT: 73 %
PDW BLD-RTO: 21.9 % (ref 12.4–15.4)
PDW BLD-RTO: 24.9 % (ref 12.4–15.4)
PHOSPHORUS: 2.9 MG/DL (ref 2.5–4.9)
PLATELET # BLD: 43 K/UL (ref 135–450)
PLATELET # BLD: 50 K/UL (ref 135–450)
PLATELET # BLD: 51 K/UL (ref 135–450)
PLT NEUTA: ABNORMAL
PMV BLD AUTO: 8.8 FL (ref 5–10.5)
PMV BLD AUTO: 9 FL (ref 5–10.5)
POTASSIUM SERPL-SCNC: 4.2 MMOL/L (ref 3.5–5.1)
PROTEIN C ANTIGEN: 23 % (ref 63–153)
PROTEIN C FUNCTIONAL: 24 % (ref 83–168)
PROTEIN S, FUNCTIONAL: 39 % (ref 57–131)
PROTHROMBIN G20210A MUTATION: NEGATIVE
PT D: 21.1 SEC (ref 12–15.5)
PT PCR SPECIMEN: NORMAL
PTT D: 139 SEC (ref 32–48)
PTT-D CORR REFLEX: 45 SEC (ref 32–48)
PTT-HEPARIN NEUTRALIZED: 82 SEC (ref 32–48)
RBC # BLD: 2.65 M/UL (ref 4–5.2)
RBC # BLD: 3.73 M/UL (ref 4–5.2)
REASON FOR REJECTION: NORMAL
REASON FOR REJECTION: NORMAL
REJECTED TEST: NORMAL
REJECTED TEST: NORMAL
REPTILASE TIME: 31.4 SEC
SODIUM BLD-SCNC: 137 MMOL/L (ref 136–145)
SPECIMEN: NORMAL
THROMBIN TIME: 35 SEC (ref 14.7–19.5)
WBC # BLD: 4.5 K/UL (ref 4–11)
WBC # BLD: 5.5 K/UL (ref 4–11)

## 2019-03-11 PROCEDURE — 6360000002 HC RX W HCPCS: Performed by: INTERNAL MEDICINE

## 2019-03-11 PROCEDURE — 85730 THROMBOPLASTIN TIME PARTIAL: CPT

## 2019-03-11 PROCEDURE — 6370000000 HC RX 637 (ALT 250 FOR IP): Performed by: INTERNAL MEDICINE

## 2019-03-11 PROCEDURE — 86923 COMPATIBILITY TEST ELECTRIC: CPT

## 2019-03-11 PROCEDURE — 1200000000 HC SEMI PRIVATE

## 2019-03-11 PROCEDURE — 85049 AUTOMATED PLATELET COUNT: CPT

## 2019-03-11 PROCEDURE — 83735 ASSAY OF MAGNESIUM: CPT

## 2019-03-11 PROCEDURE — 36415 COLL VENOUS BLD VENIPUNCTURE: CPT

## 2019-03-11 PROCEDURE — 85025 COMPLETE CBC W/AUTO DIFF WBC: CPT

## 2019-03-11 PROCEDURE — 6370000000 HC RX 637 (ALT 250 FOR IP): Performed by: FAMILY MEDICINE

## 2019-03-11 PROCEDURE — 94760 N-INVAS EAR/PLS OXIMETRY 1: CPT

## 2019-03-11 PROCEDURE — 88112 CYTOPATH CELL ENHANCE TECH: CPT

## 2019-03-11 PROCEDURE — 2580000003 HC RX 258: Performed by: INTERNAL MEDICINE

## 2019-03-11 PROCEDURE — P9016 RBC LEUKOCYTES REDUCED: HCPCS

## 2019-03-11 PROCEDURE — 2580000003 HC RX 258: Performed by: ANESTHESIOLOGY

## 2019-03-11 PROCEDURE — 86900 BLOOD TYPING SEROLOGIC ABO: CPT

## 2019-03-11 PROCEDURE — 88305 TISSUE EXAM BY PATHOLOGIST: CPT

## 2019-03-11 PROCEDURE — P9046 ALBUMIN (HUMAN), 25%, 20 ML: HCPCS | Performed by: INTERNAL MEDICINE

## 2019-03-11 PROCEDURE — 86901 BLOOD TYPING SEROLOGIC RH(D): CPT

## 2019-03-11 PROCEDURE — 36430 TRANSFUSION BLD/BLD COMPNT: CPT

## 2019-03-11 PROCEDURE — 97530 THERAPEUTIC ACTIVITIES: CPT

## 2019-03-11 PROCEDURE — 6360000002 HC RX W HCPCS: Performed by: FAMILY MEDICINE

## 2019-03-11 PROCEDURE — 89051 BODY FLUID CELL COUNT: CPT

## 2019-03-11 PROCEDURE — 97535 SELF CARE MNGMENT TRAINING: CPT

## 2019-03-11 PROCEDURE — 86850 RBC ANTIBODY SCREEN: CPT

## 2019-03-11 PROCEDURE — 6370000000 HC RX 637 (ALT 250 FOR IP): Performed by: PHYSICIAN ASSISTANT

## 2019-03-11 PROCEDURE — 84100 ASSAY OF PHOSPHORUS: CPT

## 2019-03-11 PROCEDURE — 80048 BASIC METABOLIC PNL TOTAL CA: CPT

## 2019-03-11 RX ORDER — 0.9 % SODIUM CHLORIDE 0.9 %
250 INTRAVENOUS SOLUTION INTRAVENOUS ONCE
Status: COMPLETED | OUTPATIENT
Start: 2019-03-11 | End: 2019-03-11

## 2019-03-11 RX ORDER — DOCUSATE SODIUM 100 MG/1
100 CAPSULE, LIQUID FILLED ORAL DAILY
Status: DISCONTINUED | OUTPATIENT
Start: 2019-03-11 | End: 2019-03-14 | Stop reason: HOSPADM

## 2019-03-11 RX ADMIN — Medication 1 TABLET: at 09:25

## 2019-03-11 RX ADMIN — Medication 10 ML: at 09:25

## 2019-03-11 RX ADMIN — Medication 15 ML: at 11:14

## 2019-03-11 RX ADMIN — DIBASIC SODIUM PHOSPHATE, MONOBASIC POTASSIUM PHOSPHATE AND MONOBASIC SODIUM PHOSPHATE 1 TABLET: 852; 155; 130 TABLET ORAL at 20:39

## 2019-03-11 RX ADMIN — Medication 10 ML: at 09:27

## 2019-03-11 RX ADMIN — FLUCONAZOLE, SODIUM CHLORIDE 100 MG: 2 INJECTION INTRAVENOUS at 23:05

## 2019-03-11 RX ADMIN — DOCUSATE SODIUM 100 MG: 100 CAPSULE, LIQUID FILLED ORAL at 11:45

## 2019-03-11 RX ADMIN — BUPRENORPHINE HYDROCHLORIDE, NALOXONE HYDROCHLORIDE 0.5 FILM: 8; 2 FILM, SOLUBLE BUCCAL; SUBLINGUAL at 20:34

## 2019-03-11 RX ADMIN — CIPROFLOXACIN 400 MG: 2 INJECTION, SOLUTION INTRAVENOUS at 09:25

## 2019-03-11 RX ADMIN — BUPRENORPHINE HYDROCHLORIDE, NALOXONE HYDROCHLORIDE 0.5 FILM: 8; 2 FILM, SOLUBLE BUCCAL; SUBLINGUAL at 09:26

## 2019-03-11 RX ADMIN — POLYETHYLENE GLYCOL 3350, SODIUM SULFATE ANHYDROUS, SODIUM BICARBONATE, SODIUM CHLORIDE, POTASSIUM CHLORIDE 2000 ML: 236; 22.74; 6.74; 5.86; 2.97 POWDER, FOR SOLUTION ORAL at 17:29

## 2019-03-11 RX ADMIN — IRON SUCROSE 200 MG: 20 INJECTION, SOLUTION INTRAVENOUS at 12:14

## 2019-03-11 RX ADMIN — Medication 10 ML: at 20:37

## 2019-03-11 RX ADMIN — Medication 10 ML: at 20:38

## 2019-03-11 RX ADMIN — POTASSIUM BICARBONATE 40 MEQ: 782 TABLET, EFFERVESCENT ORAL at 20:36

## 2019-03-11 RX ADMIN — SODIUM CHLORIDE 250 ML: 9 INJECTION, SOLUTION INTRAVENOUS at 15:45

## 2019-03-11 RX ADMIN — DIBASIC SODIUM PHOSPHATE, MONOBASIC POTASSIUM PHOSPHATE AND MONOBASIC SODIUM PHOSPHATE 1 TABLET: 852; 155; 130 TABLET ORAL at 09:25

## 2019-03-11 RX ADMIN — FUROSEMIDE 40 MG: 10 INJECTION, SOLUTION INTRAMUSCULAR; INTRAVENOUS at 09:25

## 2019-03-11 RX ADMIN — Medication 1 TABLET: at 20:34

## 2019-03-11 RX ADMIN — PANTOPRAZOLE SODIUM 40 MG: 40 TABLET, DELAYED RELEASE ORAL at 06:36

## 2019-03-11 RX ADMIN — THIAMINE HYDROCHLORIDE 100 MG: 100 INJECTION, SOLUTION INTRAMUSCULAR; INTRAVENOUS at 11:14

## 2019-03-11 RX ADMIN — PANCRELIPASE 2 CAPSULE: 24000; 76000; 120000 CAPSULE, DELAYED RELEASE PELLETS ORAL at 11:45

## 2019-03-11 RX ADMIN — BUPRENORPHINE HYDROCHLORIDE, NALOXONE HYDROCHLORIDE 0.5 FILM: 8; 2 FILM, SOLUBLE BUCCAL; SUBLINGUAL at 14:18

## 2019-03-11 RX ADMIN — ALBUMIN (HUMAN) 25 G: 0.25 INJECTION, SOLUTION INTRAVENOUS at 02:22

## 2019-03-11 RX ADMIN — PANCRELIPASE 2 CAPSULE: 24000; 76000; 120000 CAPSULE, DELAYED RELEASE PELLETS ORAL at 17:35

## 2019-03-11 RX ADMIN — POTASSIUM BICARBONATE 40 MEQ: 782 TABLET, EFFERVESCENT ORAL at 09:26

## 2019-03-11 RX ADMIN — FUROSEMIDE 40 MG: 10 INJECTION, SOLUTION INTRAMUSCULAR; INTRAVENOUS at 17:35

## 2019-03-11 RX ADMIN — Medication 1 TABLET: at 14:18

## 2019-03-11 RX ADMIN — PANCRELIPASE 2 CAPSULE: 24000; 76000; 120000 CAPSULE, DELAYED RELEASE PELLETS ORAL at 09:25

## 2019-03-11 ASSESSMENT — PAIN SCALES - GENERAL
PAINLEVEL_OUTOF10: 8
PAINLEVEL_OUTOF10: 6
PAINLEVEL_OUTOF10: 4
PAINLEVEL_OUTOF10: 8
PAINLEVEL_OUTOF10: 0
PAINLEVEL_OUTOF10: 7
PAINLEVEL_OUTOF10: 4

## 2019-03-11 ASSESSMENT — PAIN DESCRIPTION - ONSET
ONSET: ON-GOING

## 2019-03-11 ASSESSMENT — PAIN DESCRIPTION - PAIN TYPE
TYPE: ACUTE PAIN
TYPE: ACUTE PAIN
TYPE: ACUTE PAIN;CHRONIC PAIN
TYPE: ACUTE PAIN

## 2019-03-11 ASSESSMENT — PAIN DESCRIPTION - PROGRESSION
CLINICAL_PROGRESSION: NOT CHANGED

## 2019-03-11 ASSESSMENT — PAIN DESCRIPTION - DESCRIPTORS
DESCRIPTORS: CRAMPING
DESCRIPTORS: CRAMPING
DESCRIPTORS: NUMBNESS;TINGLING;ACHING

## 2019-03-11 ASSESSMENT — PAIN DESCRIPTION - ORIENTATION
ORIENTATION: MID

## 2019-03-11 ASSESSMENT — PAIN DESCRIPTION - LOCATION
LOCATION: ABDOMEN
LOCATION: ABDOMEN
LOCATION: BACK;ABDOMEN;LEG
LOCATION: ABDOMEN

## 2019-03-11 ASSESSMENT — PAIN - FUNCTIONAL ASSESSMENT
PAIN_FUNCTIONAL_ASSESSMENT: PREVENTS OR INTERFERES SOME ACTIVE ACTIVITIES AND ADLS

## 2019-03-11 ASSESSMENT — PAIN DESCRIPTION - FREQUENCY
FREQUENCY: CONTINUOUS

## 2019-03-12 ENCOUNTER — ANESTHESIA (OUTPATIENT)
Dept: ENDOSCOPY | Age: 64
DRG: 811 | End: 2019-03-12
Payer: MEDICARE

## 2019-03-12 ENCOUNTER — ANESTHESIA EVENT (OUTPATIENT)
Dept: ENDOSCOPY | Age: 64
DRG: 811 | End: 2019-03-12
Payer: MEDICARE

## 2019-03-12 ENCOUNTER — APPOINTMENT (OUTPATIENT)
Dept: ULTRASOUND IMAGING | Age: 64
DRG: 811 | End: 2019-03-12
Payer: MEDICARE

## 2019-03-12 VITALS
DIASTOLIC BLOOD PRESSURE: 76 MMHG | OXYGEN SATURATION: 100 % | RESPIRATION RATE: 14 BRPM | SYSTOLIC BLOOD PRESSURE: 111 MMHG

## 2019-03-12 LAB
ALBUMIN FLUID: 0.7 G/DL
ANION GAP SERPL CALCULATED.3IONS-SCNC: 9 MMOL/L (ref 3–16)
APPEARANCE FLUID: NORMAL
APTT: 102 SEC (ref 26–36)
BASOPHILS ABSOLUTE: 0.1 K/UL (ref 0–0.2)
BASOPHILS RELATIVE PERCENT: 1.5 %
BLOOD BANK DISPENSE STATUS: NORMAL
BLOOD BANK PRODUCT CODE: NORMAL
BPU ID: NORMAL
BUN BLDV-MCNC: 2 MG/DL (ref 7–20)
CALCIUM SERPL-MCNC: 7.7 MG/DL (ref 8.3–10.6)
CELL COUNT FLUID TYPE: NORMAL
CHLORIDE BLD-SCNC: 96 MMOL/L (ref 99–110)
CLOT EVALUATION: NORMAL
CO2: 29 MMOL/L (ref 21–32)
COLOR FLUID: NORMAL
CREAT SERPL-MCNC: <0.5 MG/DL (ref 0.6–1.2)
DESCRIPTION BLOOD BANK: NORMAL
EOSINOPHILS ABSOLUTE: 0.1 K/UL (ref 0–0.6)
EOSINOPHILS RELATIVE PERCENT: 3.7 %
FLUID TYPE: NORMAL
GFR AFRICAN AMERICAN: >60
GFR NON-AFRICAN AMERICAN: >60
GLUCOSE BLD-MCNC: 67 MG/DL (ref 70–99)
HCT VFR BLD CALC: 29.3 % (ref 36–48)
HEMOGLOBIN ELECTROPHORESIS: NORMAL
HEMOGLOBIN: 9.3 G/DL (ref 12–16)
HGB ELECTROPHORESIS INTERP: NORMAL
LYMPHOCYTES ABSOLUTE: 0.9 K/UL (ref 1–5.1)
LYMPHOCYTES RELATIVE PERCENT: 26 %
LYMPHOCYTES, BODY FLUID: 10 %
MAGNESIUM: 1.6 MG/DL (ref 1.8–2.4)
MCH RBC QN AUTO: 28.6 PG (ref 26–34)
MCHC RBC AUTO-ENTMCNC: 31.8 G/DL (ref 31–36)
MCV RBC AUTO: 89.9 FL (ref 80–100)
MONOCYTE, FLUID: 1 %
MONOCYTES ABSOLUTE: 0.3 K/UL (ref 0–1.3)
MONOCYTES RELATIVE PERCENT: 8.6 %
NEUTROPHIL, FLUID: 89 %
NEUTROPHILS ABSOLUTE: 2.1 K/UL (ref 1.7–7.7)
NEUTROPHILS RELATIVE PERCENT: 60.2 %
NUCLEATED CELLS FLUID: 139 /CUMM
NUMBER OF CELLS COUNTED FLUID: 100
PDW BLD-RTO: 21.5 % (ref 12.4–15.4)
PHOSPHORUS: 3.4 MG/DL (ref 2.5–4.9)
PLATELET # BLD: 33 K/UL (ref 135–450)
PMV BLD AUTO: 8.3 FL (ref 5–10.5)
POTASSIUM SERPL-SCNC: 5.8 MMOL/L (ref 3.5–5.1)
PROTEIN FLUID: 1.3 G/DL
RBC # BLD: 3.26 M/UL (ref 4–5.2)
RBC FLUID: NORMAL /CUMM
SODIUM BLD-SCNC: 134 MMOL/L (ref 136–145)
TISSUE TRANSGLUTAMINASE IGA: 1 U/ML (ref 0–3)
VOLUME: 800 ML
WBC # BLD: 3.5 K/UL (ref 4–11)

## 2019-03-12 PROCEDURE — 6360000002 HC RX W HCPCS: Performed by: INTERNAL MEDICINE

## 2019-03-12 PROCEDURE — 2580000003 HC RX 258: Performed by: INTERNAL MEDICINE

## 2019-03-12 PROCEDURE — 82042 OTHER SOURCE ALBUMIN QUAN EA: CPT

## 2019-03-12 PROCEDURE — 6370000000 HC RX 637 (ALT 250 FOR IP): Performed by: INTERNAL MEDICINE

## 2019-03-12 PROCEDURE — 85025 COMPLETE CBC W/AUTO DIFF WBC: CPT

## 2019-03-12 PROCEDURE — 0D738ZZ DILATION OF LOWER ESOPHAGUS, VIA NATURAL OR ARTIFICIAL OPENING ENDOSCOPIC: ICD-10-PCS | Performed by: INTERNAL MEDICINE

## 2019-03-12 PROCEDURE — 2580000003 HC RX 258: Performed by: NURSE ANESTHETIST, CERTIFIED REGISTERED

## 2019-03-12 PROCEDURE — 84100 ASSAY OF PHOSPHORUS: CPT

## 2019-03-12 PROCEDURE — 3700000000 HC ANESTHESIA ATTENDED CARE: Performed by: INTERNAL MEDICINE

## 2019-03-12 PROCEDURE — 87205 SMEAR GRAM STAIN: CPT

## 2019-03-12 PROCEDURE — 85730 THROMBOPLASTIN TIME PARTIAL: CPT

## 2019-03-12 PROCEDURE — 3609019000 HC EGD CAPSULE ENDOSCOPY: Performed by: INTERNAL MEDICINE

## 2019-03-12 PROCEDURE — 1200000000 HC SEMI PRIVATE

## 2019-03-12 PROCEDURE — 7100000000 HC PACU RECOVERY - FIRST 15 MIN

## 2019-03-12 PROCEDURE — 2720000010 HC SURG SUPPLY STERILE: Performed by: INTERNAL MEDICINE

## 2019-03-12 PROCEDURE — 2500000003 HC RX 250 WO HCPCS: Performed by: NURSE ANESTHETIST, CERTIFIED REGISTERED

## 2019-03-12 PROCEDURE — 80048 BASIC METABOLIC PNL TOTAL CA: CPT

## 2019-03-12 PROCEDURE — C1726 CATH, BAL DIL, NON-VASCULAR: HCPCS | Performed by: INTERNAL MEDICINE

## 2019-03-12 PROCEDURE — 0W9G3ZX DRAINAGE OF PERITONEAL CAVITY, PERCUTANEOUS APPROACH, DIAGNOSTIC: ICD-10-PCS | Performed by: RADIOLOGY

## 2019-03-12 PROCEDURE — 36430 TRANSFUSION BLD/BLD COMPNT: CPT

## 2019-03-12 PROCEDURE — 87070 CULTURE OTHR SPECIMN AEROBIC: CPT

## 2019-03-12 PROCEDURE — P9035 PLATELET PHERES LEUKOREDUCED: HCPCS

## 2019-03-12 PROCEDURE — 7100000001 HC PACU RECOVERY - ADDTL 15 MIN

## 2019-03-12 PROCEDURE — 6360000002 HC RX W HCPCS: Performed by: NURSE ANESTHETIST, CERTIFIED REGISTERED

## 2019-03-12 PROCEDURE — 2580000003 HC RX 258: Performed by: ANESTHESIOLOGY

## 2019-03-12 PROCEDURE — 84157 ASSAY OF PROTEIN OTHER: CPT

## 2019-03-12 PROCEDURE — 0DJ07ZZ INSPECTION OF UPPER INTESTINAL TRACT, VIA NATURAL OR ARTIFICIAL OPENING: ICD-10-PCS | Performed by: INTERNAL MEDICINE

## 2019-03-12 PROCEDURE — 94760 N-INVAS EAR/PLS OXIMETRY 1: CPT

## 2019-03-12 PROCEDURE — 3609012500 HC EGD DILATION BALLOON: Performed by: INTERNAL MEDICINE

## 2019-03-12 PROCEDURE — 3700000001 HC ADD 15 MINUTES (ANESTHESIA): Performed by: INTERNAL MEDICINE

## 2019-03-12 PROCEDURE — 2709999900 US GUIDED PARACENTESIS

## 2019-03-12 PROCEDURE — 83735 ASSAY OF MAGNESIUM: CPT

## 2019-03-12 RX ORDER — 0.9 % SODIUM CHLORIDE 0.9 %
250 INTRAVENOUS SOLUTION INTRAVENOUS ONCE
Status: COMPLETED | OUTPATIENT
Start: 2019-03-12 | End: 2019-03-12

## 2019-03-12 RX ORDER — KETAMINE HCL IN NACL, ISO-OSM 100MG/10ML
SYRINGE (ML) INJECTION PRN
Status: DISCONTINUED | OUTPATIENT
Start: 2019-03-12 | End: 2019-03-12 | Stop reason: SDUPTHER

## 2019-03-12 RX ORDER — PROPOFOL 10 MG/ML
INJECTION, EMULSION INTRAVENOUS PRN
Status: DISCONTINUED | OUTPATIENT
Start: 2019-03-12 | End: 2019-03-12 | Stop reason: SDUPTHER

## 2019-03-12 RX ORDER — MAGNESIUM SULFATE IN WATER 40 MG/ML
2 INJECTION, SOLUTION INTRAVENOUS ONCE
Status: DISCONTINUED | OUTPATIENT
Start: 2019-03-12 | End: 2019-03-14 | Stop reason: HOSPADM

## 2019-03-12 RX ORDER — SODIUM CHLORIDE 9 MG/ML
INJECTION, SOLUTION INTRAVENOUS CONTINUOUS PRN
Status: DISCONTINUED | OUTPATIENT
Start: 2019-03-12 | End: 2019-03-12 | Stop reason: SDUPTHER

## 2019-03-12 RX ORDER — LIDOCAINE HYDROCHLORIDE 20 MG/ML
INJECTION, SOLUTION EPIDURAL; INFILTRATION; INTRACAUDAL; PERINEURAL PRN
Status: DISCONTINUED | OUTPATIENT
Start: 2019-03-12 | End: 2019-03-12 | Stop reason: SDUPTHER

## 2019-03-12 RX ADMIN — DEXTROSE 0.5 MCG/KG/MIN: 5 SOLUTION INTRAVENOUS at 18:16

## 2019-03-12 RX ADMIN — LIDOCAINE HYDROCHLORIDE 4 MG: 20 INJECTION, SOLUTION EPIDURAL; INFILTRATION; INTRACAUDAL; PERINEURAL at 13:37

## 2019-03-12 RX ADMIN — BUPRENORPHINE HYDROCHLORIDE, NALOXONE HYDROCHLORIDE 0.5 FILM: 8; 2 FILM, SOLUBLE BUCCAL; SUBLINGUAL at 10:26

## 2019-03-12 RX ADMIN — POTASSIUM BICARBONATE 40 MEQ: 782 TABLET, EFFERVESCENT ORAL at 21:01

## 2019-03-12 RX ADMIN — PANCRELIPASE 2 CAPSULE: 24000; 76000; 120000 CAPSULE, DELAYED RELEASE PELLETS ORAL at 16:58

## 2019-03-12 RX ADMIN — PROPOFOL 20 MG: 10 INJECTION, EMULSION INTRAVENOUS at 13:32

## 2019-03-12 RX ADMIN — Medication 1 TABLET: at 21:01

## 2019-03-12 RX ADMIN — Medication 5 MG: at 13:33

## 2019-03-12 RX ADMIN — PHYTONADIONE 10 MG: 10 INJECTION, EMULSION INTRAMUSCULAR; INTRAVENOUS; SUBCUTANEOUS at 16:59

## 2019-03-12 RX ADMIN — LIDOCAINE HYDROCHLORIDE 10 MG: 20 INJECTION, SOLUTION EPIDURAL; INFILTRATION; INTRACAUDAL; PERINEURAL at 13:32

## 2019-03-12 RX ADMIN — Medication 5 MG: at 13:35

## 2019-03-12 RX ADMIN — SODIUM CHLORIDE 250 ML: 9 INJECTION, SOLUTION INTRAVENOUS at 10:51

## 2019-03-12 RX ADMIN — DIBASIC SODIUM PHOSPHATE, MONOBASIC POTASSIUM PHOSPHATE AND MONOBASIC SODIUM PHOSPHATE 1 TABLET: 852; 155; 130 TABLET ORAL at 21:01

## 2019-03-12 RX ADMIN — SODIUM CHLORIDE: 9 INJECTION, SOLUTION INTRAVENOUS at 13:13

## 2019-03-12 RX ADMIN — Medication 10 ML: at 10:33

## 2019-03-12 RX ADMIN — PROPOFOL 8 MG: 10 INJECTION, EMULSION INTRAVENOUS at 13:41

## 2019-03-12 RX ADMIN — FUROSEMIDE 40 MG: 10 INJECTION, SOLUTION INTRAMUSCULAR; INTRAVENOUS at 16:59

## 2019-03-12 RX ADMIN — BUPRENORPHINE HYDROCHLORIDE, NALOXONE HYDROCHLORIDE 0.5 FILM: 8; 2 FILM, SOLUBLE BUCCAL; SUBLINGUAL at 15:30

## 2019-03-12 RX ADMIN — THIAMINE HYDROCHLORIDE 100 MG: 100 INJECTION, SOLUTION INTRAMUSCULAR; INTRAVENOUS at 16:59

## 2019-03-12 RX ADMIN — PROPOFOL 8 MG: 10 INJECTION, EMULSION INTRAVENOUS at 13:37

## 2019-03-12 RX ADMIN — LIDOCAINE HYDROCHLORIDE 4 MG: 20 INJECTION, SOLUTION EPIDURAL; INFILTRATION; INTRACAUDAL; PERINEURAL at 13:41

## 2019-03-12 RX ADMIN — BUPRENORPHINE HYDROCHLORIDE, NALOXONE HYDROCHLORIDE 0.5 FILM: 8; 2 FILM, SOLUBLE BUCCAL; SUBLINGUAL at 21:02

## 2019-03-12 ASSESSMENT — PAIN DESCRIPTION - PAIN TYPE
TYPE: CHRONIC PAIN
TYPE: CHRONIC PAIN

## 2019-03-12 ASSESSMENT — PAIN DESCRIPTION - PROGRESSION: CLINICAL_PROGRESSION: NOT CHANGED

## 2019-03-12 ASSESSMENT — PULMONARY FUNCTION TESTS
PIF_VALUE: 0

## 2019-03-12 ASSESSMENT — PAIN DESCRIPTION - LOCATION
LOCATION: GENERALIZED;BACK
LOCATION: GENERALIZED

## 2019-03-12 ASSESSMENT — PAIN - FUNCTIONAL ASSESSMENT: PAIN_FUNCTIONAL_ASSESSMENT: PREVENTS OR INTERFERES SOME ACTIVE ACTIVITIES AND ADLS

## 2019-03-12 ASSESSMENT — PAIN SCALES - GENERAL
PAINLEVEL_OUTOF10: 5
PAINLEVEL_OUTOF10: 0
PAINLEVEL_OUTOF10: 8
PAINLEVEL_OUTOF10: 4
PAINLEVEL_OUTOF10: 7
PAINLEVEL_OUTOF10: 0

## 2019-03-12 ASSESSMENT — PAIN DESCRIPTION - FREQUENCY: FREQUENCY: CONTINUOUS

## 2019-03-12 ASSESSMENT — PAIN DESCRIPTION - ONSET: ONSET: ON-GOING

## 2019-03-12 ASSESSMENT — PAIN DESCRIPTION - ORIENTATION: ORIENTATION: MID

## 2019-03-12 ASSESSMENT — PAIN DESCRIPTION - DESCRIPTORS: DESCRIPTORS: NUMBNESS;TINGLING

## 2019-03-12 ASSESSMENT — LIFESTYLE VARIABLES: SMOKING_STATUS: 0

## 2019-03-12 NOTE — ANESTHESIA PRE PROCEDURE
sucrose (VENOFER) 200 mg in sodium chloride 0.9 % 100 mL IVPB  200 mg Intravenous Q24H Antonieta Gu Jr., DO   Stopped at 03/11/19 1418    potassium bicarb-citric acid (EFFER-K) effervescent tablet 40 mEq  40 mEq Oral BID Maddie Shaw, DO   40 mEq at 03/11/19 2036    fluconazole (DIFLUCAN) 100 mg IVPB  100 mg Intravenous Q24H Maddie Shaw,  mL/hr at 03/11/19 2305 100 mg at 03/11/19 2305    sodium chloride flush 0.9 % injection 10 mL  10 mL Intravenous 2 times per day Kirt Miner MD   10 mL at 03/12/19 1033    sodium chloride flush 0.9 % injection 10 mL  10 mL Intravenous PRN Kirt Miner MD        pantoprazole (PROTONIX) tablet 40 mg  40 mg Oral QAM AC Juan Ctamera Callejas., DO   Stopped at 03/12/19 0536    MULTIVITAMIN+ solution 15 mL  15 mL Oral Daily Antonieta Gu Jr., DO   15 mL at 03/11/19 1114    lidocaine PF 1 % injection 5 mL  5 mL Intradermal Once Suly Vazquez., DO   Stopped at 03/07/19 1607    sodium chloride flush 0.9 % injection 10 mL  10 mL Intravenous 2 times per day Suly Vazquez., DO   10 mL at 03/12/19 1033    sodium chloride flush 0.9 % injection 10 mL  10 mL Intravenous PRN Antonieta Gu Jr., DO        phosphorus (K PHOS NEUTRAL) tablet 1 tablet  250 mg Oral BID Antonieta Gu Jr., DO   1 tablet at 03/11/19 2039    calcium-vitamin D (OSCAL-500) 500-200 MG-UNIT per tablet 1 tablet  1 tablet Oral TID Antonieta Gu Jr., DO   1 tablet at 03/11/19 2034    buprenorphine-naloxone (SUBOXONE) 8-2 MG SL film 0.5 Film  0.5 Film Sublingual TID Antonieta Gu Jr., DO   0.5 Film at 03/12/19 1026    acetaminophen (TYLENOL) tablet 650 mg  650 mg Oral Q8H PRN Suly Vazquez., DO   650 mg at 03/09/19 2113    thiamine (B-1) 100 mg in sodium chloride 0.9 % 100 mL IVPB  100 mg Intravenous Q24H Suly Vazquez., DO   Stopped at 03/11/19 2878       Allergies:  No Known Allergies    Problem List:    Patient Active Problem List   Diagnosis Code    Severe anemia D64.9    Severe malnutrition ([facility] [address] E43    Acute deep vein thrombosis (DVT) of iliac vein of left lower extremity (HCC) I82.422    Iron deficiency anemia due to chronic blood loss D50.0       Past Medical History:  No past medical history on file. Past Surgical History:        Procedure Laterality Date    COLONOSCOPY N/A 3/8/2019    COLONOSCOPY POLYPECTOMY HOT snare with clip placed performed by Geronimo Chua., DO at [address] N/A 3/6/2019    EGD W/EUS FNA performed by Shadi Hyatt MD at [address] N/A 3/6/2019    [facility] [address] [facility] N/A 3/6/2019    EGD DILATION BALLOON performed by Shadi Hyatt MD at [address] History:    Social History     Tobacco Use    Smoking status: Never Smoker    Smokeless tobacco: Never Used   Substance Use Topics    Alcohol use: Not Currently                                Counseling given: Not Answered      Vital Signs (Current): There were no vitals filed for this visit.                                            BP Readings from Last 3 Encounters:   03/12/19 117/74   03/08/19 107/65   03/06/19 101/62       NPO Status:                                                                                 BMI:   Wt Readings from Last 3 Encounters:   03/12/19 84 lb 14 oz (38.5 kg)     There is no height or weight on file to calculate BMI.    CBC:   Lab Results   Component Value Date    WBC 3.5 03/12/2019    RBC 3.26 03/12/2019    HGB 9.3 03/12/2019    HCT 29.3 03/12/2019    MCV 89.9 03/12/2019    RDW 21.5 03/12/2019    PLT 33 03/12/2019       CMP:   Lab Results   Component Value Date     03/12/2019    K 5.8 03/12/2019    K 3.7 03/04/2019    CL 96 03/12/2019    CO2 29 03/12/2019    BUN 2 03/12/2019    CREATININE <0.5 03/12/2019    GFRAA >60 03/12/2019    AGRATIO 0.6 03/04/2019    LABGLOM >60 03/12/2019    GLUCOSE 67 03/12/2019    PROT 3.8 03/10/2019    CALCIUM 7.7 03/12/2019    BILITOT 0.9 03/10/2019    ALKPHOS 115 03/10/2019    AST 19 03/10/2019    ALT 8 03/10/2019       POC Tests: No results for input(s): POCGLU, POCNA, POCK, POCCL, POCBUN, POCHEMO, POCHCT in the last 72 hours. Coags:   Lab Results   Component Value Date    PROTIME 22.2 03/05/2019    INR 1.95 03/05/2019    APTT 52.7 03/11/2019       HCG (If Applicable): No results found for: PREGTESTUR, PREGSERUM, HCG, HCGQUANT     ABGs: No results found for: PHART, PO2ART, GGL7AOZ, DBJ1CBH, BEART, L2OOBXPG     Type & Screen (If Applicable):  No results found for: LABABO, [address]    Anesthesia Evaluation  Patient summary reviewed and Nursing notes reviewed no history of anesthetic complications:   Airway: Mallampati: II  TM distance: >3 FB   Neck ROM: full  Mouth opening: > = 3 FB Dental:    (+) edentulous      Pulmonary:Negative Pulmonary ROS breath sounds clear to auscultation      (-) COPD, asthma and not a current smoker          Patient did not smoke on day of surgery. Cardiovascular:        (-) pacemaker, hypertension, past MI, CAD, CABG/stent and dysrhythmias    ECG reviewed  Rhythm: regular  Rate: normal           Beta Blocker:  Not on Beta Blocker         Neuro/Psych:      (-) seizures, TIA and CVA           GI/Hepatic/Renal:   (+) bowel prep,      (-) liver disease and no renal disease       Endo/Other:    (+) blood dyscrasia: anemia and anticoagulation therapy:., electrolyte abnormalities, no malignancy/cancer. (-) diabetes mellitus, no malignancy/cancer               Abdominal:       Abdomen: soft. Vascular:   + DVT, . Anesthesia Plan      TIVA and MAC     ASA 3       Induction: intravenous. Anesthetic plan and risks discussed with patient and sibling. Plan discussed with CRNA.           This pre-anesthesia assessment may be used as a history and physical.    DOS STAFF ADDENDUM:    Pt seen and examined, chart reviewed (including anesthesia, drug and allergy history). No interval changes to history and physical examination. Anesthetic plan, risks, benefits, alternatives, and personnel involved discussed with patient. Patient verbalized an understanding and agrees to proceed.       Gina Conrad MD  March 12, 2019  12:51 PM          Gina Conrad MD   3/12/2019

## 2019-03-12 NOTE — ANESTHESIA POSTPROCEDURE EVALUATION
Department of Anesthesiology  Postprocedure Note    Patient: Monisha Parker  MRN: [id number]  YOB: 1955  Date of evaluation: 3/12/2019  Time:  4:46 PM     Procedure Summary     Date:  03/12/19 Room / Location:  [facility] ENDO 04 / [facility] ENDOSCOPY    Anesthesia Start:  1319 Anesthesia Stop:  2430    Procedures:       ESOPHAGOGASTRODUODENOSCOPY WITH CAPSULE ENDOSCOPY DEPLOYMENT (N/A )      EGD DILATION BALLOON (N/A ) Diagnosis:  (ANEMIA)    Surgeon:  Marcie Perera MD Responsible Provider:  Tamra Carnes MD    Anesthesia Type:  TIVA, MAC ASA Status:  3          Anesthesia Type: TIVA, MAC    [place] Phase I: [place] Score: 10    [place] Phase II:      Last vitals: Reviewed and per EMR flowsheets.        Anesthesia Post Evaluation    Patient location during evaluation: PACU  Patient participation: complete - patient participated  Level of consciousness: awake and alert  Airway patency: patent  Nausea & Vomiting: no nausea and no vomiting  Complications: no  Cardiovascular status: hemodynamically stable  Respiratory status: acceptable  Hydration status: stable

## 2019-03-13 ENCOUNTER — APPOINTMENT (OUTPATIENT)
Dept: CT IMAGING | Age: 64
DRG: 811 | End: 2019-03-13
Payer: MEDICARE

## 2019-03-13 LAB
ANION GAP SERPL CALCULATED.3IONS-SCNC: 7 MMOL/L (ref 3–16)
ANISOCYTOSIS: ABNORMAL
BASOPHILS ABSOLUTE: 0.1 K/UL (ref 0–0.2)
BASOPHILS RELATIVE PERCENT: 1.4 %
BUN BLDV-MCNC: 2 MG/DL (ref 7–20)
CALCIUM SERPL-MCNC: 8.2 MG/DL (ref 8.3–10.6)
CHLORIDE BLD-SCNC: 96 MMOL/L (ref 99–110)
CO2: 34 MMOL/L (ref 21–32)
CREAT SERPL-MCNC: <0.5 MG/DL (ref 0.6–1.2)
EOSINOPHILS ABSOLUTE: 0.2 K/UL (ref 0–0.6)
EOSINOPHILS RELATIVE PERCENT: 3.8 %
FIBRINOGEN: 112 MG/DL (ref 200–397)
GFR AFRICAN AMERICAN: >60
GFR NON-AFRICAN AMERICAN: >60
GLUCOSE BLD-MCNC: 107 MG/DL (ref 70–99)
HCT VFR BLD CALC: 27.1 % (ref 36–48)
HEMOGLOBIN: 8.7 G/DL (ref 12–16)
HYPOCHROMIA: ABNORMAL
IMMATURE RETIC FRACT: 0.33 (ref 0.21–0.37)
INR BLD: 1.38 (ref 0.86–1.14)
LYMPHOCYTES ABSOLUTE: 0.8 K/UL (ref 1–5.1)
LYMPHOCYTES RELATIVE PERCENT: 20.4 %
MAGNESIUM: 1.7 MG/DL (ref 1.8–2.4)
MCH RBC QN AUTO: 28.9 PG (ref 26–34)
MCHC RBC AUTO-ENTMCNC: 32.2 G/DL (ref 31–36)
MCV RBC AUTO: 89.7 FL (ref 80–100)
MONOCYTES ABSOLUTE: 0.3 K/UL (ref 0–1.3)
MONOCYTES RELATIVE PERCENT: 7.7 %
NEUTROPHILS ABSOLUTE: 2.6 K/UL (ref 1.7–7.7)
NEUTROPHILS RELATIVE PERCENT: 66.7 %
PDW BLD-RTO: 23.3 % (ref 12.4–15.4)
PHOSPHORUS: 3.5 MG/DL (ref 2.5–4.9)
PLATELET # BLD: 67 K/UL (ref 135–450)
PLATELET SLIDE REVIEW: ABNORMAL
PMV BLD AUTO: 8.3 FL (ref 5–10.5)
POTASSIUM SERPL-SCNC: 3.7 MMOL/L (ref 3.5–5.1)
PROTHROMBIN TIME: 15.7 SEC (ref 9.8–13)
RBC # BLD: 3.02 M/UL (ref 4–5.2)
REASON FOR REJECTION: NORMAL
REJECTED TEST: NORMAL
RETICULOCYTE ABSOLUTE COUNT: 0.12 M/UL (ref 0.02–0.1)
RETICULOCYTE COUNT PCT: 4.15 % (ref 0.5–2.18)
SLIDE REVIEW: ABNORMAL
SODIUM BLD-SCNC: 137 MMOL/L (ref 136–145)
WBC # BLD: 4 K/UL (ref 4–11)

## 2019-03-13 PROCEDURE — 6370000000 HC RX 637 (ALT 250 FOR IP): Performed by: INTERNAL MEDICINE

## 2019-03-13 PROCEDURE — 6360000002 HC RX W HCPCS: Performed by: INTERNAL MEDICINE

## 2019-03-13 PROCEDURE — 88342 IMHCHEM/IMCYTCHM 1ST ANTB: CPT

## 2019-03-13 PROCEDURE — 83735 ASSAY OF MAGNESIUM: CPT

## 2019-03-13 PROCEDURE — 77012 CT SCAN FOR NEEDLE BIOPSY: CPT

## 2019-03-13 PROCEDURE — 85045 AUTOMATED RETICULOCYTE COUNT: CPT

## 2019-03-13 PROCEDURE — 94760 N-INVAS EAR/PLS OXIMETRY 1: CPT

## 2019-03-13 PROCEDURE — 88313 SPECIAL STAINS GROUP 2: CPT

## 2019-03-13 PROCEDURE — 88184 FLOWCYTOMETRY/ TC 1 MARKER: CPT

## 2019-03-13 PROCEDURE — 85384 FIBRINOGEN ACTIVITY: CPT

## 2019-03-13 PROCEDURE — 85025 COMPLETE CBC W/AUTO DIFF WBC: CPT

## 2019-03-13 PROCEDURE — 88305 TISSUE EXAM BY PATHOLOGIST: CPT

## 2019-03-13 PROCEDURE — 88185 FLOWCYTOMETRY/TC ADD-ON: CPT

## 2019-03-13 PROCEDURE — 80048 BASIC METABOLIC PNL TOTAL CA: CPT

## 2019-03-13 PROCEDURE — 97530 THERAPEUTIC ACTIVITIES: CPT

## 2019-03-13 PROCEDURE — 84100 ASSAY OF PHOSPHORUS: CPT

## 2019-03-13 PROCEDURE — 2709999900 CT BIOPSY BONE MARROW

## 2019-03-13 PROCEDURE — 6360000002 HC RX W HCPCS: Performed by: RADIOLOGY

## 2019-03-13 PROCEDURE — 07DR3ZX EXTRACTION OF ILIAC BONE MARROW, PERCUTANEOUS APPROACH, DIAGNOSTIC: ICD-10-PCS | Performed by: RADIOLOGY

## 2019-03-13 PROCEDURE — 2580000003 HC RX 258: Performed by: INTERNAL MEDICINE

## 2019-03-13 PROCEDURE — 9990000010 HC NO CHARGE VISIT

## 2019-03-13 PROCEDURE — 88311 DECALCIFY TISSUE: CPT

## 2019-03-13 PROCEDURE — 85610 PROTHROMBIN TIME: CPT

## 2019-03-13 PROCEDURE — 1200000000 HC SEMI PRIVATE

## 2019-03-13 RX ORDER — MIDAZOLAM HYDROCHLORIDE 1 MG/ML
INJECTION INTRAMUSCULAR; INTRAVENOUS DAILY PRN
Status: DISCONTINUED | OUTPATIENT
Start: 2019-03-13 | End: 2019-03-14

## 2019-03-13 RX ORDER — ACETAMINOPHEN 325 MG/1
650 TABLET ORAL EVERY 4 HOURS PRN
Status: DISCONTINUED | OUTPATIENT
Start: 2019-03-13 | End: 2019-03-14 | Stop reason: HOSPADM

## 2019-03-13 RX ORDER — FENTANYL CITRATE 50 UG/ML
INJECTION, SOLUTION INTRAMUSCULAR; INTRAVENOUS DAILY PRN
Status: DISCONTINUED | OUTPATIENT
Start: 2019-03-13 | End: 2019-03-14

## 2019-03-13 RX ADMIN — Medication 10 ML: at 21:28

## 2019-03-13 RX ADMIN — DIBASIC SODIUM PHOSPHATE, MONOBASIC POTASSIUM PHOSPHATE AND MONOBASIC SODIUM PHOSPHATE 1 TABLET: 852; 155; 130 TABLET ORAL at 21:27

## 2019-03-13 RX ADMIN — POTASSIUM BICARBONATE 40 MEQ: 782 TABLET, EFFERVESCENT ORAL at 12:51

## 2019-03-13 RX ADMIN — POTASSIUM BICARBONATE 40 MEQ: 782 TABLET, EFFERVESCENT ORAL at 21:27

## 2019-03-13 RX ADMIN — FUROSEMIDE 40 MG: 10 INJECTION, SOLUTION INTRAMUSCULAR; INTRAVENOUS at 17:06

## 2019-03-13 RX ADMIN — THIAMINE HYDROCHLORIDE 100 MG: 100 INJECTION, SOLUTION INTRAMUSCULAR; INTRAVENOUS at 14:46

## 2019-03-13 RX ADMIN — BUPRENORPHINE HYDROCHLORIDE, NALOXONE HYDROCHLORIDE 0.5 FILM: 8; 2 FILM, SOLUBLE BUCCAL; SUBLINGUAL at 14:46

## 2019-03-13 RX ADMIN — MIDAZOLAM 1 MG: 1 INJECTION INTRAMUSCULAR; INTRAVENOUS at 10:43

## 2019-03-13 RX ADMIN — FLUCONAZOLE, SODIUM CHLORIDE 100 MG: 2 INJECTION INTRAVENOUS at 00:09

## 2019-03-13 RX ADMIN — PANCRELIPASE 2 CAPSULE: 24000; 76000; 120000 CAPSULE, DELAYED RELEASE PELLETS ORAL at 17:06

## 2019-03-13 RX ADMIN — BUPRENORPHINE HYDROCHLORIDE, NALOXONE HYDROCHLORIDE 0.5 FILM: 8; 2 FILM, SOLUBLE BUCCAL; SUBLINGUAL at 09:46

## 2019-03-13 RX ADMIN — Medication 1 TABLET: at 21:27

## 2019-03-13 RX ADMIN — FENTANYL CITRATE 50 MCG: 50 INJECTION INTRAMUSCULAR; INTRAVENOUS at 10:43

## 2019-03-13 RX ADMIN — PANTOPRAZOLE SODIUM 40 MG: 40 TABLET, DELAYED RELEASE ORAL at 06:45

## 2019-03-13 RX ADMIN — BUPRENORPHINE HYDROCHLORIDE, NALOXONE HYDROCHLORIDE 0.5 FILM: 8; 2 FILM, SOLUBLE BUCCAL; SUBLINGUAL at 21:27

## 2019-03-13 RX ADMIN — DOCUSATE SODIUM 100 MG: 100 CAPSULE, LIQUID FILLED ORAL at 12:50

## 2019-03-13 RX ADMIN — PANCRELIPASE 2 CAPSULE: 24000; 76000; 120000 CAPSULE, DELAYED RELEASE PELLETS ORAL at 13:13

## 2019-03-13 RX ADMIN — DIBASIC SODIUM PHOSPHATE, MONOBASIC POTASSIUM PHOSPHATE AND MONOBASIC SODIUM PHOSPHATE 1 TABLET: 852; 155; 130 TABLET ORAL at 12:49

## 2019-03-13 RX ADMIN — Medication 10 ML: at 15:19

## 2019-03-13 RX ADMIN — Medication 1 TABLET: at 13:13

## 2019-03-13 ASSESSMENT — PAIN SCALES - GENERAL
PAINLEVEL_OUTOF10: 7
PAINLEVEL_OUTOF10: 7
PAINLEVEL_OUTOF10: 0

## 2019-03-14 VITALS
HEART RATE: 88 BPM | OXYGEN SATURATION: 95 % | WEIGHT: 82.67 LBS | RESPIRATION RATE: 16 BRPM | TEMPERATURE: 98.2 F | DIASTOLIC BLOOD PRESSURE: 70 MMHG | HEIGHT: 62 IN | SYSTOLIC BLOOD PRESSURE: 100 MMHG | BODY MASS INDEX: 15.21 KG/M2

## 2019-03-14 LAB
ANION GAP SERPL CALCULATED.3IONS-SCNC: 8 MMOL/L (ref 3–16)
ANISOCYTOSIS: ABNORMAL
BASOPHILS ABSOLUTE: 0.1 K/UL (ref 0–0.2)
BASOPHILS RELATIVE PERCENT: 1.3 %
BUN BLDV-MCNC: 5 MG/DL (ref 7–20)
CALCIUM SERPL-MCNC: 7.9 MG/DL (ref 8.3–10.6)
CHLORIDE BLD-SCNC: 94 MMOL/L (ref 99–110)
CO2: 35 MMOL/L (ref 21–32)
CREAT SERPL-MCNC: <0.5 MG/DL (ref 0.6–1.2)
EOSINOPHILS ABSOLUTE: 0.1 K/UL (ref 0–0.6)
EOSINOPHILS RELATIVE PERCENT: 3 %
GFR AFRICAN AMERICAN: >60
GFR NON-AFRICAN AMERICAN: >60
GLUCOSE BLD-MCNC: 87 MG/DL (ref 70–99)
HCT VFR BLD CALC: 28.8 % (ref 36–48)
HEMOGLOBIN: 9.2 G/DL (ref 12–16)
LYMPHOCYTES ABSOLUTE: 1.2 K/UL (ref 1–5.1)
LYMPHOCYTES RELATIVE PERCENT: 23.8 %
MAGNESIUM: 1.4 MG/DL (ref 1.8–2.4)
MCH RBC QN AUTO: 28.9 PG (ref 26–34)
MCHC RBC AUTO-ENTMCNC: 32 G/DL (ref 31–36)
MCV RBC AUTO: 90.4 FL (ref 80–100)
MONOCYTES ABSOLUTE: 0.4 K/UL (ref 0–1.3)
MONOCYTES RELATIVE PERCENT: 8 %
NEUTROPHILS ABSOLUTE: 3.1 K/UL (ref 1.7–7.7)
NEUTROPHILS RELATIVE PERCENT: 63.9 %
PDW BLD-RTO: 23.5 % (ref 12.4–15.4)
PHOSPHORUS: 4 MG/DL (ref 2.5–4.9)
PLATELET # BLD: 62 K/UL (ref 135–450)
PMV BLD AUTO: 8.8 FL (ref 5–10.5)
POLYCHROMASIA: ABNORMAL
POTASSIUM SERPL-SCNC: 4.4 MMOL/L (ref 3.5–5.1)
RBC # BLD: 3.19 M/UL (ref 4–5.2)
SODIUM BLD-SCNC: 137 MMOL/L (ref 136–145)
WBC # BLD: 4.9 K/UL (ref 4–11)

## 2019-03-14 PROCEDURE — 6370000000 HC RX 637 (ALT 250 FOR IP): Performed by: INTERNAL MEDICINE

## 2019-03-14 PROCEDURE — 2580000003 HC RX 258: Performed by: INTERNAL MEDICINE

## 2019-03-14 PROCEDURE — 6360000002 HC RX W HCPCS: Performed by: INTERNAL MEDICINE

## 2019-03-14 PROCEDURE — 97535 SELF CARE MNGMENT TRAINING: CPT

## 2019-03-14 PROCEDURE — 80048 BASIC METABOLIC PNL TOTAL CA: CPT

## 2019-03-14 PROCEDURE — 84100 ASSAY OF PHOSPHORUS: CPT

## 2019-03-14 PROCEDURE — 83735 ASSAY OF MAGNESIUM: CPT

## 2019-03-14 PROCEDURE — 94760 N-INVAS EAR/PLS OXIMETRY 1: CPT

## 2019-03-14 PROCEDURE — 85025 COMPLETE CBC W/AUTO DIFF WBC: CPT

## 2019-03-14 PROCEDURE — 97530 THERAPEUTIC ACTIVITIES: CPT | Performed by: PHYSICAL THERAPIST

## 2019-03-14 PROCEDURE — 0DJ07ZZ INSPECTION OF UPPER INTESTINAL TRACT, VIA NATURAL OR ARTIFICIAL OPENING: ICD-10-PCS | Performed by: INTERNAL MEDICINE

## 2019-03-14 PROCEDURE — 97530 THERAPEUTIC ACTIVITIES: CPT

## 2019-03-14 RX ORDER — OYSTER SHELL CALCIUM WITH VITAMIN D 500; 200 MG/1; [IU]/1
1 TABLET, FILM COATED ORAL 3 TIMES DAILY
Qty: 30 TABLET | Refills: 3 | Status: ON HOLD | DISCHARGE
Start: 2019-03-14 | End: 2022-09-06 | Stop reason: HOSPADM

## 2019-03-14 RX ORDER — MAGNESIUM SULFATE IN WATER 40 MG/ML
2 INJECTION, SOLUTION INTRAVENOUS ONCE
Status: COMPLETED | OUTPATIENT
Start: 2019-03-14 | End: 2019-03-14

## 2019-03-14 RX ORDER — FLUCONAZOLE 100 MG/1
100 TABLET ORAL DAILY
Status: DISCONTINUED | OUTPATIENT
Start: 2019-03-15 | End: 2019-03-14 | Stop reason: HOSPADM

## 2019-03-14 RX ORDER — MULTIVIT/FOLIC ACID/HERBAL 275 400-200/30
15 LIQUID (ML) ORAL DAILY
Qty: 450 ML | Refills: 0 | DISCHARGE
Start: 2019-03-15 | End: 2019-04-14

## 2019-03-14 RX ORDER — PANTOPRAZOLE SODIUM 40 MG/1
40 TABLET, DELAYED RELEASE ORAL
Qty: 30 TABLET | Refills: 3 | Status: ON HOLD | DISCHARGE
Start: 2019-03-15 | End: 2022-09-06 | Stop reason: HOSPADM

## 2019-03-14 RX ORDER — PSEUDOEPHEDRINE HCL 30 MG
100 TABLET ORAL DAILY
Qty: 30 CAPSULE | Refills: 0 | Status: ON HOLD | DISCHARGE
Start: 2019-03-15 | End: 2022-09-06 | Stop reason: HOSPADM

## 2019-03-14 RX ORDER — BUPRENORPHINE AND NALOXONE 8; 2 MG/1; MG/1
1 FILM, SOLUBLE BUCCAL; SUBLINGUAL 2 TIMES DAILY
Qty: 14 FILM | Refills: 0 | Status: SHIPPED | OUTPATIENT
Start: 2019-03-14 | End: 2019-03-21

## 2019-03-14 RX ORDER — FUROSEMIDE 40 MG/1
40 TABLET ORAL 2 TIMES DAILY
Qty: 60 TABLET | Refills: 3 | Status: ON HOLD | DISCHARGE
Start: 2019-03-14 | End: 2022-09-06 | Stop reason: HOSPADM

## 2019-03-14 RX ADMIN — APIXABAN 5 MG: 5 TABLET, FILM COATED ORAL at 11:18

## 2019-03-14 RX ADMIN — PANTOPRAZOLE SODIUM 40 MG: 40 TABLET, DELAYED RELEASE ORAL at 06:06

## 2019-03-14 RX ADMIN — PANCRELIPASE 2 CAPSULE: 24000; 76000; 120000 CAPSULE, DELAYED RELEASE PELLETS ORAL at 09:22

## 2019-03-14 RX ADMIN — THIAMINE HYDROCHLORIDE 100 MG: 100 INJECTION, SOLUTION INTRAMUSCULAR; INTRAVENOUS at 09:42

## 2019-03-14 RX ADMIN — BUPRENORPHINE HYDROCHLORIDE, NALOXONE HYDROCHLORIDE 0.5 FILM: 8; 2 FILM, SOLUBLE BUCCAL; SUBLINGUAL at 14:04

## 2019-03-14 RX ADMIN — BUPRENORPHINE HYDROCHLORIDE, NALOXONE HYDROCHLORIDE 0.5 FILM: 8; 2 FILM, SOLUBLE BUCCAL; SUBLINGUAL at 09:23

## 2019-03-14 RX ADMIN — POTASSIUM BICARBONATE 40 MEQ: 782 TABLET, EFFERVESCENT ORAL at 09:23

## 2019-03-14 RX ADMIN — FUROSEMIDE 40 MG: 10 INJECTION, SOLUTION INTRAMUSCULAR; INTRAVENOUS at 09:22

## 2019-03-14 RX ADMIN — DOCUSATE SODIUM 100 MG: 100 CAPSULE, LIQUID FILLED ORAL at 09:23

## 2019-03-14 RX ADMIN — Medication 1 TABLET: at 09:23

## 2019-03-14 RX ADMIN — PANCRELIPASE 2 CAPSULE: 24000; 76000; 120000 CAPSULE, DELAYED RELEASE PELLETS ORAL at 12:50

## 2019-03-14 RX ADMIN — FLUCONAZOLE, SODIUM CHLORIDE 100 MG: 2 INJECTION INTRAVENOUS at 00:47

## 2019-03-14 RX ADMIN — Medication 15 ML: at 09:22

## 2019-03-14 RX ADMIN — Medication 1 TABLET: at 14:04

## 2019-03-14 RX ADMIN — MAGNESIUM SULFATE HEPTAHYDRATE 2 G: 40 INJECTION, SOLUTION INTRAVENOUS at 09:24

## 2019-03-14 RX ADMIN — Medication 10 ML: at 09:42

## 2019-03-14 RX ADMIN — DIBASIC SODIUM PHOSPHATE, MONOBASIC POTASSIUM PHOSPHATE AND MONOBASIC SODIUM PHOSPHATE 1 TABLET: 852; 155; 130 TABLET ORAL at 09:23

## 2019-03-14 ASSESSMENT — PAIN DESCRIPTION - ORIENTATION
ORIENTATION: RIGHT

## 2019-03-14 ASSESSMENT — PAIN DESCRIPTION - LOCATION
LOCATION: GROIN

## 2019-03-14 ASSESSMENT — PAIN SCALES - GENERAL
PAINLEVEL_OUTOF10: 7
PAINLEVEL_OUTOF10: 9
PAINLEVEL_OUTOF10: 7
PAINLEVEL_OUTOF10: 9

## 2019-03-14 ASSESSMENT — PAIN DESCRIPTION - DESCRIPTORS
DESCRIPTORS: CRAMPING

## 2019-03-14 ASSESSMENT — PAIN DESCRIPTION - PROGRESSION
CLINICAL_PROGRESSION: NOT CHANGED
CLINICAL_PROGRESSION: GRADUALLY IMPROVING
CLINICAL_PROGRESSION: GRADUALLY IMPROVING
CLINICAL_PROGRESSION: NOT CHANGED

## 2019-03-14 ASSESSMENT — PAIN DESCRIPTION - ONSET
ONSET: GRADUAL
ONSET: ON-GOING
ONSET: ON-GOING
ONSET: GRADUAL

## 2019-03-14 ASSESSMENT — PAIN DESCRIPTION - FREQUENCY
FREQUENCY: CONTINUOUS

## 2019-03-14 ASSESSMENT — PAIN DESCRIPTION - PAIN TYPE
TYPE: CHRONIC PAIN

## 2019-03-15 LAB
BODY FLUID CULTURE, STERILE: NORMAL
GRAM STAIN RESULT: NORMAL

## 2019-03-19 LAB
EKG ATRIAL RATE: 106 BPM
EKG DIAGNOSIS: NORMAL
EKG P AXIS: 72 DEGREES
EKG P-R INTERVAL: 118 MS
EKG Q-T INTERVAL: 316 MS
EKG QRS DURATION: 70 MS
EKG QTC CALCULATION (BAZETT): 419 MS
EKG R AXIS: 79 DEGREES
EKG T AXIS: 76 DEGREES
EKG VENTRICULAR RATE: 106 BPM

## 2019-03-20 LAB
Lab: NORMAL
REPORT: NORMAL
THIS TEST SENT TO: NORMAL

## 2022-09-01 ENCOUNTER — ANESTHESIA EVENT (OUTPATIENT)
Dept: ENDOSCOPY | Age: 67
DRG: 299 | End: 2022-09-01
Payer: MEDICARE

## 2022-09-01 ENCOUNTER — HOSPITAL ENCOUNTER (INPATIENT)
Age: 67
LOS: 5 days | Discharge: HOME | DRG: 299 | End: 2022-09-06
Attending: STUDENT IN AN ORGANIZED HEALTH CARE EDUCATION/TRAINING PROGRAM | Admitting: HOSPITALIST
Payer: MEDICARE

## 2022-09-01 ENCOUNTER — ANESTHESIA (OUTPATIENT)
Dept: ENDOSCOPY | Age: 67
DRG: 299 | End: 2022-09-01
Payer: MEDICARE

## 2022-09-01 ENCOUNTER — APPOINTMENT (OUTPATIENT)
Dept: CT IMAGING | Age: 67
DRG: 299 | End: 2022-09-01
Payer: MEDICARE

## 2022-09-01 DIAGNOSIS — Z79.01 ON CONTINUOUS ORAL ANTICOAGULATION: ICD-10-CM

## 2022-09-01 DIAGNOSIS — K92.2 ACUTE UPPER GASTROINTESTINAL HEMORRHAGE: ICD-10-CM

## 2022-09-01 DIAGNOSIS — R79.89 AZOTEMIA: ICD-10-CM

## 2022-09-01 DIAGNOSIS — R57.8 HEMORRHAGIC SHOCK (HCC): Primary | ICD-10-CM

## 2022-09-01 PROBLEM — K51.00 PANCOLITIS (HCC): Status: ACTIVE | Noted: 2022-09-01

## 2022-09-01 LAB
ABO/RH: NORMAL
ALBUMIN SERPL-MCNC: 3.1 G/DL (ref 3.4–5)
ALP BLD-CCNC: 95 U/L (ref 40–129)
ALT SERPL-CCNC: 14 U/L (ref 10–40)
ANION GAP SERPL CALCULATED.3IONS-SCNC: 16 MMOL/L (ref 3–16)
ANTIBODY SCREEN: NORMAL
APTT: 32 SEC (ref 23–34.3)
AST SERPL-CCNC: 34 U/L (ref 15–37)
BASOPHILS ABSOLUTE: 0.1 K/UL (ref 0–0.2)
BASOPHILS RELATIVE PERCENT: 0.8 %
BILIRUB SERPL-MCNC: 2.9 MG/DL (ref 0–1)
BILIRUBIN DIRECT: 1.4 MG/DL (ref 0–0.3)
BILIRUBIN, INDIRECT: 1.5 MG/DL (ref 0–1)
BLOOD BANK DISPENSE STATUS: NORMAL
BLOOD BANK DISPENSE STATUS: NORMAL
BLOOD BANK PRODUCT CODE: NORMAL
BLOOD BANK PRODUCT CODE: NORMAL
BPU ID: NORMAL
BPU ID: NORMAL
BUN BLDV-MCNC: 39 MG/DL (ref 7–20)
CALCIUM SERPL-MCNC: 8.8 MG/DL (ref 8.3–10.6)
CHLORIDE BLD-SCNC: 108 MMOL/L (ref 99–110)
CO2: 22 MMOL/L (ref 21–32)
CREAT SERPL-MCNC: <0.5 MG/DL (ref 0.6–1.2)
DESCRIPTION BLOOD BANK: NORMAL
DESCRIPTION BLOOD BANK: NORMAL
EKG ATRIAL RATE: 103 BPM
EKG DIAGNOSIS: NORMAL
EKG P AXIS: 70 DEGREES
EKG P-R INTERVAL: 112 MS
EKG Q-T INTERVAL: 380 MS
EKG QRS DURATION: 74 MS
EKG QTC CALCULATION (BAZETT): 497 MS
EKG R AXIS: 77 DEGREES
EKG T AXIS: -5 DEGREES
EKG VENTRICULAR RATE: 103 BPM
EOSINOPHILS ABSOLUTE: 0.1 K/UL (ref 0–0.6)
EOSINOPHILS RELATIVE PERCENT: 1.2 %
GFR AFRICAN AMERICAN: >60
GFR NON-AFRICAN AMERICAN: >60
GLUCOSE BLD-MCNC: 115 MG/DL (ref 70–99)
HCT VFR BLD CALC: 19 % (ref 36–48)
HCT VFR BLD CALC: 28.4 % (ref 36–48)
HCT VFR BLD CALC: 31.2 % (ref 36–48)
HEMOGLOBIN: 10.7 G/DL (ref 12–16)
HEMOGLOBIN: 6.3 G/DL (ref 12–16)
HEMOGLOBIN: 9.7 G/DL (ref 12–16)
INR BLD: 2.02 (ref 0.87–1.14)
LYMPHOCYTES ABSOLUTE: 1.9 K/UL (ref 1–5.1)
LYMPHOCYTES RELATIVE PERCENT: 26.3 %
MCH RBC QN AUTO: 29.6 PG (ref 26–34)
MCHC RBC AUTO-ENTMCNC: 33.3 G/DL (ref 31–36)
MCV RBC AUTO: 89 FL (ref 80–100)
MONOCYTES ABSOLUTE: 0.6 K/UL (ref 0–1.3)
MONOCYTES RELATIVE PERCENT: 8.3 %
NEUTROPHILS ABSOLUTE: 4.6 K/UL (ref 1.7–7.7)
NEUTROPHILS RELATIVE PERCENT: 63.4 %
OCCULT BLOOD DIAGNOSTIC: ABNORMAL
PDW BLD-RTO: 17.4 % (ref 12.4–15.4)
PLATELET # BLD: 145 K/UL (ref 135–450)
PMV BLD AUTO: 8.8 FL (ref 5–10.5)
POTASSIUM REFLEX MAGNESIUM: 3.8 MMOL/L (ref 3.5–5.1)
PROTHROMBIN TIME: 22.9 SEC (ref 11.7–14.5)
RBC # BLD: 2.13 M/UL (ref 4–5.2)
REASON FOR REJECTION: NORMAL
REJECTED TEST: NORMAL
SODIUM BLD-SCNC: 146 MMOL/L (ref 136–145)
TOTAL PROTEIN: 5 G/DL (ref 6.4–8.2)
TROPONIN: 0.01 NG/ML
WBC # BLD: 7.2 K/UL (ref 4–11)

## 2022-09-01 PROCEDURE — 3E0G8GC INTRODUCTION OF OTHER THERAPEUTIC SUBSTANCE INTO UPPER GI, VIA NATURAL OR ARTIFICIAL OPENING ENDOSCOPIC: ICD-10-PCS | Performed by: INTERNAL MEDICINE

## 2022-09-01 PROCEDURE — 93005 ELECTROCARDIOGRAM TRACING: CPT | Performed by: STUDENT IN AN ORGANIZED HEALTH CARE EDUCATION/TRAINING PROGRAM

## 2022-09-01 PROCEDURE — 2580000003 HC RX 258: Performed by: PHYSICIAN ASSISTANT

## 2022-09-01 PROCEDURE — 96365 THER/PROPH/DIAG IV INF INIT: CPT

## 2022-09-01 PROCEDURE — 6360000002 HC RX W HCPCS: Performed by: STUDENT IN AN ORGANIZED HEALTH CARE EDUCATION/TRAINING PROGRAM

## 2022-09-01 PROCEDURE — 96375 TX/PRO/DX INJ NEW DRUG ADDON: CPT

## 2022-09-01 PROCEDURE — 2500000003 HC RX 250 WO HCPCS: Performed by: HOSPITALIST

## 2022-09-01 PROCEDURE — 6360000002 HC RX W HCPCS: Performed by: PHYSICIAN ASSISTANT

## 2022-09-01 PROCEDURE — 6360000002 HC RX W HCPCS: Performed by: INTERNAL MEDICINE

## 2022-09-01 PROCEDURE — 2580000003 HC RX 258: Performed by: STUDENT IN AN ORGANIZED HEALTH CARE EDUCATION/TRAINING PROGRAM

## 2022-09-01 PROCEDURE — 99285 EMERGENCY DEPT VISIT HI MDM: CPT

## 2022-09-01 PROCEDURE — 6360000004 HC RX CONTRAST MEDICATION: Performed by: STUDENT IN AN ORGANIZED HEALTH CARE EDUCATION/TRAINING PROGRAM

## 2022-09-01 PROCEDURE — 85730 THROMBOPLASTIN TIME PARTIAL: CPT

## 2022-09-01 PROCEDURE — 80076 HEPATIC FUNCTION PANEL: CPT

## 2022-09-01 PROCEDURE — 2580000003 HC RX 258: Performed by: HOSPITALIST

## 2022-09-01 PROCEDURE — 36430 TRANSFUSION BLD/BLD COMPNT: CPT

## 2022-09-01 PROCEDURE — 85014 HEMATOCRIT: CPT

## 2022-09-01 PROCEDURE — 2720000010 HC SURG SUPPLY STERILE: Performed by: INTERNAL MEDICINE

## 2022-09-01 PROCEDURE — 86900 BLOOD TYPING SEROLOGIC ABO: CPT

## 2022-09-01 PROCEDURE — 2580000003 HC RX 258: Performed by: INTERNAL MEDICINE

## 2022-09-01 PROCEDURE — 3700000000 HC ANESTHESIA ATTENDED CARE: Performed by: INTERNAL MEDICINE

## 2022-09-01 PROCEDURE — 3609013800 HC EGD SUBMUCOSAL/BOTOX INJECTION: Performed by: INTERNAL MEDICINE

## 2022-09-01 PROCEDURE — 2709999900 HC NON-CHARGEABLE SUPPLY: Performed by: INTERNAL MEDICINE

## 2022-09-01 PROCEDURE — 74174 CTA ABD&PLVS W/CONTRAST: CPT

## 2022-09-01 PROCEDURE — 85025 COMPLETE CBC W/AUTO DIFF WBC: CPT

## 2022-09-01 PROCEDURE — 3700000001 HC ADD 15 MINUTES (ANESTHESIA): Performed by: INTERNAL MEDICINE

## 2022-09-01 PROCEDURE — 3609013000 HC EGD TRANSORAL CONTROL BLEEDING ANY METHOD: Performed by: INTERNAL MEDICINE

## 2022-09-01 PROCEDURE — C9113 INJ PANTOPRAZOLE SODIUM, VIA: HCPCS | Performed by: STUDENT IN AN ORGANIZED HEALTH CARE EDUCATION/TRAINING PROGRAM

## 2022-09-01 PROCEDURE — 3609012300 HC EGD BAND LIGATION ESOPHGEAL/GASTRIC VARICES: Performed by: INTERNAL MEDICINE

## 2022-09-01 PROCEDURE — 96374 THER/PROPH/DIAG INJ IV PUSH: CPT

## 2022-09-01 PROCEDURE — 6360000002 HC RX W HCPCS

## 2022-09-01 PROCEDURE — 86901 BLOOD TYPING SEROLOGIC RH(D): CPT

## 2022-09-01 PROCEDURE — 80048 BASIC METABOLIC PNL TOTAL CA: CPT

## 2022-09-01 PROCEDURE — 94760 N-INVAS EAR/PLS OXIMETRY 1: CPT

## 2022-09-01 PROCEDURE — 96376 TX/PRO/DX INJ SAME DRUG ADON: CPT

## 2022-09-01 PROCEDURE — 85610 PROTHROMBIN TIME: CPT

## 2022-09-01 PROCEDURE — 86850 RBC ANTIBODY SCREEN: CPT

## 2022-09-01 PROCEDURE — 36415 COLL VENOUS BLD VENIPUNCTURE: CPT

## 2022-09-01 PROCEDURE — 2500000003 HC RX 250 WO HCPCS

## 2022-09-01 PROCEDURE — 93010 ELECTROCARDIOGRAM REPORT: CPT | Performed by: INTERNAL MEDICINE

## 2022-09-01 PROCEDURE — 6370000000 HC RX 637 (ALT 250 FOR IP): Performed by: HOSPITALIST

## 2022-09-01 PROCEDURE — P9016 RBC LEUKOCYTES REDUCED: HCPCS

## 2022-09-01 PROCEDURE — 82270 OCCULT BLOOD FECES: CPT

## 2022-09-01 PROCEDURE — 06L38CZ OCCLUSION OF ESOPHAGEAL VEIN WITH EXTRALUMINAL DEVICE, VIA NATURAL OR ARTIFICIAL OPENING ENDOSCOPIC: ICD-10-PCS | Performed by: INTERNAL MEDICINE

## 2022-09-01 PROCEDURE — 1200000000 HC SEMI PRIVATE

## 2022-09-01 PROCEDURE — 0W3P8ZZ CONTROL BLEEDING IN GASTROINTESTINAL TRACT, VIA NATURAL OR ARTIFICIAL OPENING ENDOSCOPIC: ICD-10-PCS | Performed by: INTERNAL MEDICINE

## 2022-09-01 PROCEDURE — 84484 ASSAY OF TROPONIN QUANT: CPT

## 2022-09-01 PROCEDURE — 85018 HEMOGLOBIN: CPT

## 2022-09-01 PROCEDURE — 86923 COMPATIBILITY TEST ELECTRIC: CPT

## 2022-09-01 RX ORDER — SODIUM CHLORIDE 0.9 % (FLUSH) 0.9 %
5-40 SYRINGE (ML) INJECTION EVERY 12 HOURS SCHEDULED
Status: DISCONTINUED | OUTPATIENT
Start: 2022-09-01 | End: 2022-09-06 | Stop reason: HOSPADM

## 2022-09-01 RX ORDER — ONDANSETRON 4 MG/1
4 TABLET, ORALLY DISINTEGRATING ORAL EVERY 8 HOURS PRN
Status: DISCONTINUED | OUTPATIENT
Start: 2022-09-01 | End: 2022-09-06 | Stop reason: HOSPADM

## 2022-09-01 RX ORDER — SODIUM CHLORIDE 9 MG/ML
INJECTION, SOLUTION INTRAVENOUS PRN
Status: DISCONTINUED | OUTPATIENT
Start: 2022-09-01 | End: 2022-09-06 | Stop reason: HOSPADM

## 2022-09-01 RX ORDER — ACETAMINOPHEN 650 MG/1
650 SUPPOSITORY RECTAL EVERY 6 HOURS PRN
Status: DISCONTINUED | OUTPATIENT
Start: 2022-09-01 | End: 2022-09-06 | Stop reason: HOSPADM

## 2022-09-01 RX ORDER — METRONIDAZOLE 500 MG/100ML
500 INJECTION, SOLUTION INTRAVENOUS EVERY 8 HOURS
Status: DISCONTINUED | OUTPATIENT
Start: 2022-09-01 | End: 2022-09-02

## 2022-09-01 RX ORDER — OYSTER SHELL CALCIUM WITH VITAMIN D 500; 200 MG/1; [IU]/1
1 TABLET, FILM COATED ORAL 3 TIMES DAILY
Status: DISCONTINUED | OUTPATIENT
Start: 2022-09-01 | End: 2022-09-01

## 2022-09-01 RX ORDER — FUROSEMIDE 40 MG/1
40 TABLET ORAL 2 TIMES DAILY
Status: DISCONTINUED | OUTPATIENT
Start: 2022-09-01 | End: 2022-09-01

## 2022-09-01 RX ORDER — ONDANSETRON 2 MG/ML
4 INJECTION INTRAMUSCULAR; INTRAVENOUS EVERY 6 HOURS PRN
Status: DISCONTINUED | OUTPATIENT
Start: 2022-09-01 | End: 2022-09-06 | Stop reason: HOSPADM

## 2022-09-01 RX ORDER — SODIUM CHLORIDE, SODIUM LACTATE, POTASSIUM CHLORIDE, AND CALCIUM CHLORIDE .6; .31; .03; .02 G/100ML; G/100ML; G/100ML; G/100ML
500 INJECTION, SOLUTION INTRAVENOUS ONCE
Status: COMPLETED | OUTPATIENT
Start: 2022-09-01 | End: 2022-09-01

## 2022-09-01 RX ORDER — DIPHENHYDRAMINE HCL 25 MG
25 TABLET ORAL NIGHTLY PRN
Status: DISCONTINUED | OUTPATIENT
Start: 2022-09-01 | End: 2022-09-06 | Stop reason: HOSPADM

## 2022-09-01 RX ORDER — SODIUM CHLORIDE 9 MG/ML
INJECTION, SOLUTION INTRAVENOUS CONTINUOUS
Status: DISCONTINUED | OUTPATIENT
Start: 2022-09-01 | End: 2022-09-03

## 2022-09-01 RX ORDER — SODIUM CHLORIDE 0.9 % (FLUSH) 0.9 %
5-40 SYRINGE (ML) INJECTION PRN
Status: DISCONTINUED | OUTPATIENT
Start: 2022-09-01 | End: 2022-09-06 | Stop reason: HOSPADM

## 2022-09-01 RX ORDER — 0.9 % SODIUM CHLORIDE 0.9 %
1000 INTRAVENOUS SOLUTION INTRAVENOUS ONCE
Status: COMPLETED | OUTPATIENT
Start: 2022-09-01 | End: 2022-09-01

## 2022-09-01 RX ORDER — EPINEPHRINE 1 MG/ML(1)
AMPUL (ML) INJECTION PRN
Status: DISCONTINUED | OUTPATIENT
Start: 2022-09-01 | End: 2022-09-01 | Stop reason: ALTCHOICE

## 2022-09-01 RX ORDER — VANCOMYCIN HYDROCHLORIDE 125 MG/1
125 CAPSULE ORAL 4 TIMES DAILY
Status: DISCONTINUED | OUTPATIENT
Start: 2022-09-01 | End: 2022-09-02

## 2022-09-01 RX ORDER — SODIUM CHLORIDE 9 MG/ML
50 INJECTION, SOLUTION INTRAVENOUS ONCE
Status: COMPLETED | OUTPATIENT
Start: 2022-09-01 | End: 2022-09-01

## 2022-09-01 RX ORDER — ACETAMINOPHEN 325 MG/1
650 TABLET ORAL EVERY 6 HOURS PRN
Status: DISCONTINUED | OUTPATIENT
Start: 2022-09-01 | End: 2022-09-06 | Stop reason: HOSPADM

## 2022-09-01 RX ORDER — OCTREOTIDE ACETATE 100 UG/ML
100 INJECTION, SOLUTION INTRAVENOUS; SUBCUTANEOUS ONCE
Status: COMPLETED | OUTPATIENT
Start: 2022-09-01 | End: 2022-09-01

## 2022-09-01 RX ORDER — SODIUM CHLORIDE 9 MG/ML
INJECTION, SOLUTION INTRAVENOUS PRN
Status: COMPLETED | OUTPATIENT
Start: 2022-09-01 | End: 2022-09-01

## 2022-09-01 RX ORDER — LANOLIN ALCOHOL/MO/W.PET/CERES
6 CREAM (GRAM) TOPICAL NIGHTLY PRN
Status: DISCONTINUED | OUTPATIENT
Start: 2022-09-01 | End: 2022-09-06 | Stop reason: HOSPADM

## 2022-09-01 RX ORDER — LIDOCAINE HYDROCHLORIDE 10 MG/ML
INJECTION, SOLUTION EPIDURAL; INFILTRATION; INTRACAUDAL; PERINEURAL PRN
Status: DISCONTINUED | OUTPATIENT
Start: 2022-09-01 | End: 2022-09-01 | Stop reason: SDUPTHER

## 2022-09-01 RX ORDER — PROPOFOL 10 MG/ML
INJECTION, EMULSION INTRAVENOUS CONTINUOUS PRN
Status: DISCONTINUED | OUTPATIENT
Start: 2022-09-01 | End: 2022-09-01 | Stop reason: SDUPTHER

## 2022-09-01 RX ADMIN — SODIUM CHLORIDE: 9 INJECTION, SOLUTION INTRAVENOUS at 14:14

## 2022-09-01 RX ADMIN — SODIUM CHLORIDE 1000 ML: 9 INJECTION, SOLUTION INTRAVENOUS at 10:00

## 2022-09-01 RX ADMIN — PROTHROMBIN, COAGULATION FACTOR VII HUMAN, COAGULATION FACTOR IX HUMAN, COAGULATION FACTOR X HUMAN, PROTEIN C, PROTEIN S HUMAN, AND WATER 1683 UNITS: KIT at 03:20

## 2022-09-01 RX ADMIN — LIDOCAINE HYDROCHLORIDE 20 MG: 10 INJECTION, SOLUTION EPIDURAL; INFILTRATION; INTRACAUDAL; PERINEURAL at 14:20

## 2022-09-01 RX ADMIN — OCTREOTIDE ACETATE 25 MCG/HR: 500 INJECTION, SOLUTION INTRAVENOUS; SUBCUTANEOUS at 10:06

## 2022-09-01 RX ADMIN — SODIUM CHLORIDE 80 MG: 9 INJECTION, SOLUTION INTRAVENOUS at 02:50

## 2022-09-01 RX ADMIN — METRONIDAZOLE 500 MG: 500 INJECTION, SOLUTION INTRAVENOUS at 17:42

## 2022-09-01 RX ADMIN — Medication 6 MG: at 20:18

## 2022-09-01 RX ADMIN — IOPAMIDOL 75 ML: 755 INJECTION, SOLUTION INTRAVENOUS at 02:25

## 2022-09-01 RX ADMIN — PROPOFOL 140 MCG/KG/MIN: 10 INJECTION, EMULSION INTRAVENOUS at 14:20

## 2022-09-01 RX ADMIN — DIPHENHYDRAMINE HCL 25 MG: 25 TABLET ORAL at 23:53

## 2022-09-01 RX ADMIN — CEFTRIAXONE 1000 MG: 1 INJECTION, POWDER, FOR SOLUTION INTRAMUSCULAR; INTRAVENOUS at 09:12

## 2022-09-01 RX ADMIN — SODIUM CHLORIDE 50 ML: 9 INJECTION, SOLUTION INTRAVENOUS at 03:33

## 2022-09-01 RX ADMIN — SODIUM CHLORIDE 8 MG/HR: 9 INJECTION, SOLUTION INTRAVENOUS at 15:34

## 2022-09-01 RX ADMIN — SODIUM CHLORIDE: 9 INJECTION, SOLUTION INTRAVENOUS at 17:39

## 2022-09-01 RX ADMIN — OCTREOTIDE ACETATE 100 MCG: 100 INJECTION, SOLUTION INTRAVENOUS; SUBCUTANEOUS at 05:03

## 2022-09-01 RX ADMIN — VANCOMYCIN HYDROCHLORIDE 125 MG: 125 CAPSULE ORAL at 20:18

## 2022-09-01 RX ADMIN — SODIUM CHLORIDE, PRESERVATIVE FREE 10 ML: 5 INJECTION INTRAVENOUS at 19:50

## 2022-09-01 RX ADMIN — SODIUM CHLORIDE 8 MG/HR: 9 INJECTION, SOLUTION INTRAVENOUS at 03:31

## 2022-09-01 RX ADMIN — SODIUM CHLORIDE, POTASSIUM CHLORIDE, SODIUM LACTATE AND CALCIUM CHLORIDE 500 ML: 600; 310; 30; 20 INJECTION, SOLUTION INTRAVENOUS at 01:40

## 2022-09-01 ASSESSMENT — PAIN SCALES - GENERAL
PAINLEVEL_OUTOF10: 0

## 2022-09-01 ASSESSMENT — LIFESTYLE VARIABLES: SMOKING_STATUS: 0

## 2022-09-01 ASSESSMENT — ENCOUNTER SYMPTOMS
ABDOMINAL PAIN: 1
BACK PAIN: 0
NAUSEA: 0
DIARRHEA: 0
SORE THROAT: 0
VOMITING: 0
SHORTNESS OF BREATH: 0
COUGH: 0
EYE PAIN: 0
BLOOD IN STOOL: 1
ABDOMINAL DISTENTION: 1

## 2022-09-01 NOTE — ED NOTES
See new order from Dr. Sully Manzo for patient to be admitted to ICU.      Krys Jennings RN  09/01/22 [id number]

## 2022-09-01 NOTE — ANESTHESIA PRE PROCEDURE
Department of Anesthesiology  Preprocedure Note       Name:  Shelby Muhammad   Age:  79 y.o.  :  1955                                          MRN:  [id number]         Date:  2022      Surgeon: Mari Hassan):  Iza Stallings MD    Procedure: EGD CONTROL HEMORRHAGE  EGD SUBMUCOSAL/EPINEPHRINE/INJECTION  EGD BAND LIGATION    Medications prior to admission:   Prior to Admission medications    Medication Sig Start Date End Date Taking?  Authorizing Provider   apixaban (ELIQUIS) 5 MG TABS tablet Take 1 tablet by mouth 2 times daily  Patient not taking: Reported on 2022 3/14/19   Amandeep Noguera MD   lipase-protease-amylase (CREON) [id number] units delayed release capsule Take 2 capsules by mouth 3 times daily (with meals)  Patient not taking: Reported on 2022 3/14/19   Amandeep Noguera MD   furosemide (LASIX) 40 MG tablet Take 1 tablet by mouth 2 times daily  Patient not taking: Reported on 2022 3/14/19   Amandeep Noguera MD   docusate sodium (COLACE, DULCOLAX) 100 MG CAPS Take 100 mg by mouth daily  Patient not taking: Reported on 2022 3/15/19   Amandeep Noguera MD   calcium-vitamin D (Willie Jeanne) 500-200 MG-UNIT per tablet Take 1 tablet by mouth 3 times daily  Patient not taking: Reported on 2022 3/14/19   Amandeep Noguera MD   phosphorus (K PHOS NEUTRAL) [phone] MG tablet Take 1 tablet by mouth 2 times daily 3/14/19   Amandeep Noguera MD   Multiple Vitamin (MULTIVITAMIN+) LIQD solution Take 15 mLs by mouth daily 3/15/19 4/14/19  Amandeep Noguera MD   pantoprazole (PROTONIX) 40 MG tablet Take 1 tablet by mouth every morning (before breakfast)  Patient not taking: Reported on 2022 3/15/19   Amandeep Noguera MD   calcium carbonate (TUMS) 500 MG chewable tablet Take 1 tablet by mouth as needed for Heartburn    Historical Provider, MD   aspirin 81 MG tablet Take 81 mg by mouth daily  Patient not taking: Reported on 2022    Historical Provider, MD       Current medications:    No current facility-administered medications for this visit. No current outpatient medications on file. Facility-Administered Medications Ordered in Other Visits   Medication Dose Route Frequency Provider Last Rate Last Admin    pantoprazole (PROTONIX) 80 mg in sodium chloride 0.9 % 100 mL infusion  8 mg/hr IntraVENous Continuous Teddy Raza MD 10 mL/hr at 09/01/22 0331 8 mg/hr at 09/01/22 0331    0.9 % sodium chloride infusion   IntraVENous PRN Teddy Raza MD        octreotide (SANDOSTATIN) 500 mcg in sodium chloride 0.9 % 100 mL infusion  25 mcg/hr IntraVENous Continuous Nicolasa Mendoza PA 5 mL/hr at 09/01/22 1006 25 mcg/hr at 09/01/22 1006       Allergies:  No Known Allergies    Problem List:    Patient Active Problem List   Diagnosis Code    Severe anemia D64.9    Severe malnutrition ([facility] [address] E43    Acute deep vein thrombosis (DVT) of iliac vein of left lower extremity (HCC) I82.422    Iron deficiency anemia due to chronic blood loss D50.0    Pancolitis (HCC) K51.00       Past Medical History:  No past medical history on file.     Past Surgical History:        Procedure Laterality Date    COLONOSCOPY N/A 3/8/2019    COLONOSCOPY POLYPECTOMY HOT snare with clip placed performed by Jumana Hillman DO at [place] [address], SMALL BOWEL N/A 3/12/2019    ESOPHAGOGASTRODUODENOSCOPY WITH CAPSULE ENDOSCOPY DEPLOYMENT performed by Natalie Rivera MD at [address] 3/6/2019    EGD W/EUS FNA performed by [facility] MD at [facility] 3/6/2019    EGD BIOPSY performed by Natalie Rivera MD at [facility] 3/6/2019    EGD DILATION BALLOON performed by Natalie Rivera MD at [facility]  03/12/2019    ESOPHAGOGASTRODUODENOSCOPY WITH CAPSULE ENDOSCOPY DEPLOYMENT    UPPER GASTROINTESTINAL ENDOSCOPY  03/12/2019    ESOPHAGOGASTRODUODENOSCOPY WITH CAPSULE ENDOSCOPY DEPLOYMENT    UPPER GASTROINTESTINAL ENDOSCOPY N/A 3/12/2019    EGD DILATION BALLOON performed by Monica Pulido MD at [facility] History:    Social History     Tobacco Use    Smoking status: Never    Smokeless tobacco: Never   Substance Use Topics    Alcohol use: Not Currently                                Counseling given: Not Answered      Vital Signs (Current): There were no vitals filed for this visit. BP Readings from Last 3 Encounters:   09/01/22 128/85   03/14/19 100/70   03/12/19 111/76       NPO Status:                                                                                 BMI:   Wt Readings from Last 3 Encounters:   09/01/22 80 lb 12.8 oz (36.7 kg)   03/14/19 82 lb 10.8 oz (37.5 kg)     There is no height or weight on file to calculate BMI.    CBC:   Lab Results   Component Value Date/Time    WBC 7.2 09/01/2022 01:00 AM    RBC 2.13 09/01/2022 01:00 AM    HGB 10.7 09/01/2022 11:31 AM    HCT 31.2 09/01/2022 11:31 AM    MCV 89.0 09/01/2022 01:00 AM    RDW 17.4 09/01/2022 01:00 AM     09/01/2022 01:00 AM       CMP:   Lab Results   Component Value Date/Time     09/01/2022 01:00 AM    K 3.8 09/01/2022 01:00 AM     09/01/2022 01:00 AM    CO2 22 09/01/2022 01:00 AM    BUN 39 09/01/2022 01:00 AM    CREATININE <0.5 09/01/2022 01:00 AM    GFRAA >60 09/01/2022 01:00 AM    AGRATIO 0.6 03/04/2019 03:48 PM    LABGLOM >60 09/01/2022 01:00 AM    GLUCOSE 115 09/01/2022 01:00 AM    PROT 5.0 09/01/2022 01:38 AM    CALCIUM 8.8 09/01/2022 01:00 AM    BILITOT 2.9 09/01/2022 01:38 AM    ALKPHOS 95 09/01/2022 01:38 AM    AST 34 09/01/2022 01:38 AM    ALT 14 09/01/2022 01:38 AM       POC Tests: No results for input(s): POCGLU, POCNA, POCK, POCCL, POCBUN, POCHEMO, POCHCT in the last 72 hours.     Coags:   Lab Results   Component Value Date/Time    PROTIME 22.9 09/01/2022 01:38 AM    INR 2.02 09/01/2022 01:38 AM APTT 32.0 09/01/2022 01:38 AM       HCG (If Applicable): No results found for: PREGTESTUR, PREGSERUM, HCG, HCGQUANT     ABGs: No results found for: PHART, PO2ART, IFJ0GOR, LUX9IDY, BEART, B0PWHCPC     Type & Screen (If Applicable):  No results found for: [facility]    Anesthesia Evaluation  Patient summary reviewed and Nursing notes reviewed no history of anesthetic complications:   Airway: Mallampati: II  TM distance: >3 FB   Neck ROM: full  Mouth opening: > = 3 FB   Dental:    (+) edentulous      Pulmonary:Negative Pulmonary ROS       (-) COPD, asthma and not a current smoker          Patient did not smoke on day of surgery. Cardiovascular:        (-) pacemaker, hypertension, past MI, CAD, CABG/stent and dysrhythmias    ECG reviewed               Beta Blocker:  Not on Beta Blocker         Neuro/Psych:      (-) seizures, TIA and CVA           GI/Hepatic/Renal:   (+) bowel prep,      (-) liver disease and no renal disease       Endo/Other:    (+) blood dyscrasia: anemia and anticoagulation therapy:., electrolyte abnormalities, no malignancy/cancer. (-) diabetes mellitus, no malignancy/cancer               Abdominal:             Vascular:   + DVT, . Other Findings:             Anesthesia Plan      TIVA and MAC     ASA 3     (I discussed intravenous sedation to the patient's satisfaction including risks and alternatives. The patient agreed with the plan and has no further questions. Rajinder Harrington MD )  Induction: intravenous. Anesthetic plan and risks discussed with patient and sibling. Plan discussed with CRNA. This pre-anesthesia assessment may be used as a history and physical.    DOS STAFF ADDENDUM:    Pt seen and examined, chart reviewed (including anesthesia, drug and allergy history). No interval changes to history and physical examination. Anesthetic plan, risks, benefits, alternatives, and personnel involved discussed with patient.   Patient

## 2022-09-01 NOTE — ED NOTES
Patient is very uncooperative with care, patient refuses BP on both arms and legs     Byronalexia Bright RN  09/01/22 [id number]

## 2022-09-01 NOTE — ED PROVIDER NOTES
ALLERGIES     Patient has no known allergies. FAMILY HISTORY       None pertinent. SOCIAL HISTORY       Social History     Socioeconomic History    Marital status: Single   Tobacco Use    Smoking status: Never    Smokeless tobacco: Never   Substance and Sexual Activity    Alcohol use: Not Currently    Drug use: Not Currently       SCREENINGS    Sofya Coma Scale  Eye Opening: Spontaneous  Best Verbal Response: Oriented  Best Motor Response: Obeys commands  Sofya Coma Scale Score: 15        PHYSICAL EXAM    (up to 7 for level 4, 8 or more for level 5)     ED Triage Vitals [09/01/22 0054]   BP Temp Temp src Heart Rate Resp SpO2 Height Weight   109/87 97.7 °F (36.5 °C) -- (!) 111 16 98 % 5' 2\" (1.575 m) 80 lb 12.8 oz (36.7 kg)       General: Alert and oriented appropriately for age, No acute distress. Eye: Normal conjunctiva. Sclera anicteric. HENT: Oral mucosa is moist.  Respiratory: Respirations even and non-labored. Clear to auscultation bilaterally. Cardiovascular: Tachycardic, regular rhythm, intact peripheral pulses. Gastrointestinal: Soft, generalized tenderness throughout, Non-distended. : Rectal shows liquid melena. Musculoskeletal: No swelling. Integumentary: Warm, Dry. Neurologic: Alert and appropriate for age. No focal deficits. Psychiatric: Cooperative. DIAGNOSTIC RESULTS     EKG: All EKG's are interpreted by the Emergency Department Physician who either signs or Co-signsthis chart in the absence of a cardiologist.    The Ekg interpreted by me shows  Rhythm sinus tachycardia  Rate of 103 bpm  Axis is normal  Intervals and durations prolonged QT  ST Segments: Nonspecific ST abnormality  Compared to prior EKG dated March 4, 2019, no acute change.       RADIOLOGY:   Non-plain filmimages such as CT, Ultrasound and MRI are read by the radiologist. Plain radiographic images are visualized and preliminarily interpreted by the emergency physician with the below findings:      Interpretation per the Radiologist below, if available at the time ofthis note:    CTA ABDOMEN PELVIS W CONTRAST   Preliminary Result   1. No evidence of active gastrointestinal bleed. 2. Acute colitis involving the cecum through the distal descending colon. The distribution is most typical for an infectious or inflammatory etiology. An ischemic etiology could be considered in the appropriate clinical setting   but is less favored. 3. Moderate to severe atherosclerotic plaque with areas of mild stenosis in   the SMA but no major branch occlusion. 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 4. Cirrhotic configuration of the liver with small volume ascites and upper   abdominal varices. 5. Sequela of chronic pancreatitis. 6. Bony demineralization. LABS:  Labs Reviewed   CBC WITH AUTO DIFFERENTIAL - Abnormal; Notable for the following components:       Result Value    RBC 2.13 (*)     Hemoglobin 6.3 (*)     Hematocrit 19.0 (*)     RDW 17.4 (*)     All other components within normal limits    Narrative:     Kaylinnavdeepjovita Horton tel. [phone],  Hematology results called to and read back by [facility] rn, 09/01/2022  01:20, by HEALI   BASIC METABOLIC PANEL W/ REFLEX TO MG FOR LOW K - Abnormal; Notable for the following components:    Sodium 146 (*)     Glucose 115 (*)     BUN 39 (*)     Creatinine <0.5 (*)     All other components within normal limits   PROTIME-INR - Abnormal; Notable for the following components:    Protime 22.9 (*)     INR 2.02 (*)     All other components within normal limits   HEPATIC FUNCTION PANEL - Abnormal; Notable for the following components:     Total Protein 5.0 (*)     Albumin 3.1 (*)     Total Bilirubin 2.9 (*)     Bilirubin, Direct 1.4 (*)     Bilirubin, Indirect 1.5 (*)     All other components within normal limits   BLOOD OCCULT STOOL DIAGNOSTIC - Abnormal; Notable for the following components:    Occult Blood Diagnostic   (*)     Value: Result: POSITIVE  Normal range: Negative      All other components within normal limits    Narrative:     ORDER#: [id number]                          ORDERED BY: PANKAJ GRAHAM  SOURCE: Stool                              COLLECTED:  09/01/22 02:28  ANTIBIOTICS AT AVA.:                      RECEIVED :  09/01/22 02:55   APTT   TROPONIN   TYPE AND SCREEN   PREPARE RBC (CROSSMATCH)       All other labs were within normal range or not returned as of this dictation. EMERGENCY DEPARTMENT COURSE and DIFFERENTIAL DIAGNOSIS/MDM:   Vitals:    Vitals:    09/01/22 0054   BP: 109/87   Pulse: (!) 111   Resp: 16   Temp: 97.7 °F (36.5 °C)   SpO2: 98%   Weight: 80 lb 12.8 oz (36.7 kg)   Height: 5' 2\" (1.575 m)         Medical decision making:  Alicia waterman is a 78 yo F with PMHx of GIB, VTE on Eliquis who p/w melena, found to be initially hypotensive and tachycardic, improved with 500 mL crystalloid bolus and 1 u PRBC administration, Hb found to be 6.3 with 3 pt drop from baseline, azotemic with BUN in the 30s, INR 2. Giving KCentra, PPI, Rocephin ppx. D/w GI, Dr. Jorge Gallardo, who agrees with plan will scope today, recommend ICU. On reassessment, BP dropped to 80s/50s, currently receiving PRBCs, HR improved, 80s, will continue to monitor, pt mentating okay, slightly confused, protecting airway. Admitted to hospitalist, ICU. While the patient was boarding in the emergency department, became hypotensive, another PRBC unit ordered that the first 1 is still infusing. No evidence of infusion site reaction, patient without any evidence of transfusion reaction on repeat examination, she is mentating well. Went to the ICU.     Medications   pantoprazole (PROTONIX) 80 mg in sodium chloride 0.9 % 100 mL infusion (8 mg/hr IntraVENous New Bag 9/1/22 0331)   0.9 % sodium chloride infusion (has no administration in time range)   cefTRIAXone (ROCEPHIN) 1,000 mg in dextrose 5 % 50 mL IVPB mini-bag (has no administration in time range) pantoprazole (PROTONIX) 80 mg in sodium chloride 0.9 % 100 mL bolus (0 mg IntraVENous Stopped 9/1/22 0327)   lactated ringers bolus (0 mLs IntraVENous Stopped 9/1/22 0210)   iopamidol (ISOVUE-370) 76 % injection 75 mL (75 mLs IntraVENous Given 9/1/22 0225)   prothrombin complex concentrate (human) (KCENTRA) infusion 1,683 Units (0 Units IntraVENous Stopped 9/1/22 0330)     Followed by   0.9 % sodium chloride infusion (50 mLs IntraVENous New Bag 9/1/22 0333)   octreotide (SANDOSTATIN) injection 100 mcg (100 mcg IntraVENous Given 9/1/22 0503)           CRITICAL CARE TIME   I personally saw the patient and independently provided 67 minutes of non-concurrent critical care out of the total shared critical care time provided, excluding separately reportable procedures. There was a high probability of clinically significant/life threatening deterioration in the patient's condition which required my urgent intervention. Frequent reassessments of patient's hemodynamic status, respiratory status, administration of IV blood products, correction of medication induced coagulopathy, consultation with GI given the acute hemorrhage, hemorrhagic shock. Is this patient to be included in the SEP-1 Core Measure due to severe sepsis or septic shock? No   Exclusion criteria - the patient is NOT to be included for SEP-1 Core Measure due to: Infection is not suspected     CONSULTS:  IP CONSULT TO GI        FINAL IMPRESSION      1. Hemorrhagic shock ([address]    2. Acute upper gastrointestinal hemorrhage    3. On continuous oral anticoagulation    4.  Azotemia          DISPOSITION/PLAN   DISPOSITION Decision To Admit 09/01/2022 02:45:41 AM        (Please note that portions of this note were completed with a voice recognition program.Efforts were made to edit the dictations but occasionally words are mis-transcribed.)    Adis Sanders MD (electronically signed)  Attending Emergency Physician          Adsi Sanders MD  09/01/22 2115

## 2022-09-01 NOTE — CONSULTS
GASTROENTEROLOGY INPATIENT CONSULTATION        IDENTIFYING DATA/REASON FOR CONSULTATION   PATIENT:  Ariadne Rivas  MRN:  [id number]  ADMIT DATE: 9/1/2022  TIME OF EVALUATION: 9/1/2022 6:46 AM  HOSPITAL STAY:   LOS: 0 days     REASON FOR CONSULTATION:  GI bleed    HISTORY OF PRESENT ILLNESS   Ariadne Rivas is a 79 y.o. female with a PMH of ETOH cirrhosis, chronic calcific pancreatitis with pancreas divisum, VTE on Eliquis, prior occult GI bleed 2/2 small bowel AVMs who presented on 9/1/2022 with melena. Pt reports it started yesterday around 7 o'clock. She reports ~22 episodes of passing black tarry shiny stools. She complaints of mid abdominal pain and nausea but no vomiting. She has been belching and tastes blood. She takes Elodia aspirin daily (1/4th of a pill every 4 hours). She is on Eliquis for history of VTE. She has a history of alcohol induced cirrhosis seen in past at [facility]. Per CareEverywhere it appears she was last seen by UC in 2016. She was admitted here at [facility] in 2019 with profound anemia. Full endoscopic work up revealed several diminutive angioectasias in the proximal small bowel. No varices were seen on EGD. She also had a CT that showed a pancreatic lesion in the uncinate process. On follow up EUS this appeared to be a pseudocyst (s/p FNA with cytology neg for malignancy). Findings also noted chronic calcific pancreatitis with pancreas divisum. She was noted to be hypotensive in ED, improved with fluids  Blood worknoted hgb of 6.3, BUN of 39, bilirubin 2.9, INR 2.02    CTA shows no active GI bleeding, acute colitis involving the cecum through the distal descending colon, moderate to severe atherosclerotic plaque, possible chronic splenic vein occlusion, chronic calcific pancreatitis and cirrhotic liver with small volume ascites. Prior Endoscopic Evaluations:  Capsule endoscopy 3/14/19  1.   Several diminutive angioectasias in the proximal small bowel (at 40 minutes into the GASTROINTESTINAL ENDOSCOPY N/A 3/6/2019    EGD DILATION BALLOON performed by Michael Pimentel MD at [address] [place] [address]  03/12/2019    ESOPHAGOGASTRODUODENOSCOPY WITH CAPSULE ENDOSCOPY DEPLOYMENT    UPPER GASTROINTESTINAL ENDOSCOPY  03/12/2019    ESOPHAGOGASTRODUODENOSCOPY WITH CAPSULE ENDOSCOPY DEPLOYMENT    UPPER GASTROINTESTINAL ENDOSCOPY N/A 3/12/2019    EGD DILATION BALLOON performed by Michael Pimentel MD at [address] [place] [address]     No family history on file. Social History     Socioeconomic History    Marital status: Single   Tobacco Use    Smoking status: Never    Smokeless tobacco: Never   Substance and Sexual Activity    Alcohol use: Not Currently    Drug use: Not Currently       MEDICATIONS   SCHEDULED:  cefTRIAXone (ROCEPHIN) IV, 1,000 mg, Once      FLUIDS/DRIPS:     pantoprazole 8 mg/hr (09/01/22 0331)    sodium chloride      sodium chloride       PRNs: sodium chloride, , PRN  sodium chloride, , PRN      ALLERGIES:  She No Known Allergies    REVIEW OF SYSTEMS   Pertinent ROS noted in HPI    PHYSICAL EXAM     Vitals:    09/01/22 0625 09/01/22 0630 09/01/22 0635 09/01/22 0640   BP: (!) 70/46 (!) 70/44 (!) 70/42 (!) 72/46   Pulse: 92 91 90 90   Resp: 24 25 24 26   Temp:       TempSrc:       SpO2:       Weight:       Height:           No intake/output data recorded. Physical Exam:  General appearance: thin/frail, alert, cooperative,   Eyes: Anicteric  Head: Normocephalic, without obvious abnormality  Lungs: clear to auscultation bilaterally, Normal Effort  Heart: regular rate and rhythm, normal S1 and S2, no murmurs or rubs  Abdomen: soft, non-distended, non-tender. Bowel sounds normal. No masses,  no organomegaly.    Extremities: atraumatic, no cyanosis or edema  Skin: warm and dry, no jaundice  Neuro: Grossly intact, A&OX3, no asterixis      LABS AND IMAGING   Laboratory   Recent Labs     09/01/22  0100   WBC 7.2   HGB 6.3*   HCT 19.0*   MCV 89.0        Recent Labs 09/01/22  0100   *   K 3.8      CO2 22   BUN 39*   CREATININE <0.5*     Recent Labs     09/01/22  0138   AST 34   ALT 14   BILIDIR 1.4*   BILITOT 2.9*   ALKPHOS 95     No results for input(s): LIPASE, AMYLASE in the last 72 hours. Recent Labs     09/01/22  0138   PROTIME 22.9*   INR 2.02*       Imaging  CTA ABDOMEN PELVIS W CONTRAST   Preliminary Result   1. No evidence of active gastrointestinal bleed. 2. Acute colitis involving the cecum through the distal descending colon. The distribution is most typical for an infectious or inflammatory etiology. An ischemic etiology could be considered in the appropriate clinical setting   but is less favored. 3. Moderate to severe atherosclerotic plaque with areas of mild stenosis in   the SMA but no major branch occlusion. 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 4. Cirrhotic configuration of the liver with small volume ascites and upper   abdominal varices. 5. Sequela of chronic pancreatitis. 6. Bony demineralization. ASSESSMENT AND RECOMMENDATIONS   79 y.o. female with a PMH of ETOH cirrhosis, chronic calcific pancreatitis with pancreas divisum, VTE on Eliquis, prior occult GI bleed 2/2 small bowel AVMs who presented on 9/1/2022 with melena and anemia    IMPRESSION:  GI bleed with melena. Differential include variceal bleed given her history of cirrhosis with evidence of portal htn on CT (last EGD 2019 with no varices). PUD also highly suspicious given NSAID use. She has history of occult GI bleeding from small bowel avms and CTA showed significant atherosclerotic disease. CTA neg for any active hemorrhaging. She has been placed on PPI gtt. Will add octreotide gtt. Will plan for EGD +/-Push enteroscopy today once more hemodynamically stable after blood transfusion. Acute blood loss anemia 2/2 #1.  2u PRBCs ordered  Cirrhosis  -Etiology:  She has chart diagnosis from 2016 of alcohol induced cirrhosis.   Blood work 2016 showed neg MILTON, normal AMA, normal F-actin, normal ceruloplasmin and negative viral hepatitis panel  -Varices:  none on EGD 2019. Here with acute GI bleed. EGD today to evaluate  -Ascites:  prior paracentesis 2019 with fluid studies c/w cirrhotic ascites (high SAAG, low TPro). On lasix 40 mg daily  -Encephalopathy: no asterixis on exam  -Immunization status: will check   -[address] screening:  overdue. Will order RUQ US to follow EGD  -MELD:  INR 2.02, Bili 2.9. MELD Na 18  VTE on Eliquis. Chronic calcific pancreatitis suspect from alcohol    RECOMMENDATIONS:    EGD today  Keep NPO  Continue PPI gtt. Add octreotide gtt  Continue Rocephin for SBP prophyaxis in setting of GI bleed  RUQ US for [address] screening once more medically stable   Check AFP, Hep Bs Ab, Hep A Ab      If you have any questions or need any further information, please feel free to contact our consult team.  Thank you for allowing us to participate in the care of Samanthas Ortega. The note was completed using Dragon voice recognition transcription. Every effort was made to ensure accuracy; however, inadvertent transcription errors may be present despite my best efforts to edit errors.       Lila Conrad PA-C

## 2022-09-01 NOTE — ANESTHESIA POSTPROCEDURE EVALUATION
Resp:23, SpO2:100 %  09/01/22 0750, BP:119/79, Temp:98.3 °F (36.8 °C), Pulse:82, Resp:17, SpO2:99 %  09/01/22 0700, BP:117/78, Temp:99.6 °F (37.6 °C), Pulse:92, Resp:20, SpO2:98 %  09/01/22 0645, BP:(!) 82/57, Pulse:94, Resp:27 09/01/22 0640, BP:(!) 72/46, Pulse:90, Resp:26 09/01/22 0635, BP:(!) 70/42, Pulse:90, Resp:24 09/01/22 0630, BP:(!) 70/44, Pulse:91, Resp:25 09/01/22 0625, BP:(!) 70/46, Pulse:92, Resp:24 09/01/22 0620, BP:(!) 70/40, Pulse:91, Resp:23 09/01/22 0615, BP:(!) 68/43, Pulse:91, Resp:21 09/01/22 0610, BP:(!) 71/42, Pulse:92, Resp:26 09/01/22 0605, BP:(!) 69/42, Pulse:93, Resp:24 09/01/22 0600, BP:(!) 72/47, Pulse:94, Resp:23 09/01/22 0555, BP:(!) 65/46, Pulse:94, Resp:26 09/01/22 0550, BP:(!) 75/50, Pulse:93, Resp:25 09/01/22 0545, BP:(!) 76/53, Pulse:94, Resp:24, SpO2:98 %  09/01/22 0540, BP:(!) 71/57, Temp:99 °F (37.2 °C), Temp src:Oral, Pulse:94, Resp:17, SpO2:96 %  09/01/22 0535, BP:(!) 77/55, Pulse:89, Resp:17, SpO2:98 %  09/01/22 0530, BP:(!) 85/56, Pulse:88, Resp:20, SpO2:96 %  09/01/22 0525, BP:(!) 84/58, Pulse:89, Resp:19, SpO2:97 %  09/01/22 0521, BP:(!) 78/60, Pulse:87, Resp:16, SpO2:94 %  09/01/22 0520, BP:(!) 80/56, Temp:98.4 °F (36.9 °C), Temp src:Oral, Pulse:84, Resp:21, SpO2:94 %  09/01/22 0515, BP:(!) 81/53, Pulse:91, Resp:16, SpO2:96 %  09/01/22 0505, BP:119/64, Pulse:100, Resp:18, SpO2:94 %     LABS:    CBC  Lab Results       Component                Value               Date/Time                  WBC                      7.2                 09/01/2022 01:00 AM        HGB                      10.7 (L)            09/01/2022 11:31 AM        HCT                      31.2 (L)            09/01/2022 11:31 AM        PLT                      145                 09/01/2022 01:00 AM   RENAL  Lab Results       Component                Value               Date/Time                  NA                       146 (H)             09/01/2022 01:00 AM        K                        3.8 09/01/2022 01:00 AM        CL                       108                 09/01/2022 01:00 AM        CO2                      22                  09/01/2022 01:00 AM        BUN                      39 (H)              09/01/2022 01:00 AM        CREATININE               <0.5 (L)            09/01/2022 01:00 AM        GLUCOSE                  115 (H)             09/01/2022 01:00 AM   COAGS  Lab Results       Component                Value               Date/Time                  PROTIME                  22.9 (H)            09/01/2022 01:38 AM        INR                      2.02 (H)            09/01/2022 01:38 AM        APTT                     32.0                09/01/2022 01:38 AM     Intake & Output:  @33IUYO@    Nausea & Vomiting:  No    Level of Consciousness:  Awake    Pain Assessment:  Adequate analgesia    Anesthesia Complications:  No apparent anesthetic complications    SUMMARY      Vital signs stable  OK to discharge from Stage I post anesthesia care.   Care transferred from Anesthesiology department on discharge from perioperative area

## 2022-09-01 NOTE — PROGRESS NOTES
1482: To 2104, bedside report received from [facility]. Dr Salome Galvez to bedside. 1st unit of PRBC infusing. 0715: Handoff to [employer].

## 2022-09-01 NOTE — H&P
Hospital Medicine History & Physical      PCP: Referring Not In System (Inactive)    Date of Admission: 9/1/2022    Date of Service: Pt seen/examined on 09/01/22   and Admitted to Inpatient. Chief Complaint:  melena       History Of Present Illness: The patient is a 79 y.o. female who presents to [facility] with melena. Patient with a past medical history of PE/DVT on Eliquis, liver cirrhosis (likely secondary to alcohol), patient presented with melena. Patient stated that started at 7 AM the day prior to admission, patient reports more than 20 times, black tarry stool, foul-smelling, denies any nausea or vomiting, no fever, patient also reported abdominal pain, episodic 10/10 in severity, central and sharp, worsened with oral intake including water. Patient lives alone, reported history of alcohol intake however no longer drinks, non-smoker    Past Medical History:    No past medical history on file.     Past Surgical History:        Procedure Laterality Date    COLONOSCOPY N/A 3/8/2019    COLONOSCOPY POLYPECTOMY HOT snare with clip placed performed by Brenton Medina DO at [address] [place] [address], SMALL BOWEL N/A 3/12/2019    ESOPHAGOGASTRODUODENOSCOPY WITH CAPSULE ENDOSCOPY DEPLOYMENT performed by Jacki Allison MD at [address] [place] [address] 3/6/2019    EGD W/EUS FNA performed by Jacki Allison MD at [address] [place] [address] 3/6/2019    EGD BIOPSY performed by Jacki Allison MD at [address] [place] [address] 3/6/2019    EGD DILATION BALLOON performed by Jacki Allison MD at [address]  03/12/2019    ESOPHAGOGASTRODUODENOSCOPY WITH CAPSULE ENDOSCOPY DEPLOYMENT    UPPER GASTROINTESTINAL ENDOSCOPY 03/12/2019    ESOPHAGOGASTRODUODENOSCOPY WITH CAPSULE ENDOSCOPY DEPLOYMENT    UPPER GASTROINTESTINAL ENDOSCOPY N/A 3/12/2019    EGD DILATION BALLOON performed by Mayra Parra MD at [facility]       Medications Prior to Admission:    Prior to Admission medications    Medication Sig Start Date End Date Taking? Authorizing Provider   apixaban (ELIQUIS) 5 MG TABS tablet Take 1 tablet by mouth 2 times daily 3/14/19   Rod Jonas MD   lipase-protease-amylase (CREON) 86076-48280 units delayed release capsule Take 2 capsules by mouth 3 times daily (with meals) 3/14/19   Rod Jonas MD   furosemide (LASIX) 40 MG tablet Take 1 tablet by mouth 2 times daily 3/14/19   Rod Jonas MD   docusate sodium (COLACE, DULCOLAX) 100 MG CAPS Take 100 mg by mouth daily 3/15/19   Rod Jonas MD   calcium-vitamin D (Early Mohinder) 500-200 MG-UNIT per tablet Take 1 tablet by mouth 3 times daily 3/14/19   Rod Jonas MD   phosphorus (K PHOS NEUTRAL) 420-439-848 MG tablet Take 1 tablet by mouth 2 times daily 3/14/19   Rod Jonas MD   Multiple Vitamin (MULTIVITAMIN+) LIQD solution Take 15 mLs by mouth daily 3/15/19 4/14/19  Rod Jonas MD   pantoprazole (PROTONIX) 40 MG tablet Take 1 tablet by mouth every morning (before breakfast) 3/15/19   Rod Jonas MD   calcium carbonate (TUMS) 500 MG chewable tablet Take 1 tablet by mouth as needed for Heartburn    Historical Provider, MD   aspirin 81 MG tablet Take 81 mg by mouth daily    Historical Provider, MD       Allergies:  Patient has no known allergies. Social History:  The patient currently lives alone     TOBACCO:   reports that she has never smoked. She has never used smokeless tobacco.  ETOH:   reports that she does not currently use alcohol. Family History:  Reviewed in detail and negative for DM, Early CAD, Cancer, CVA. Positive as follows:    No family history on file.     REVIEW OF SYSTEMS:   Positive for abdominal pain, dizziness, melena and as noted in the HPI. All other systems reviewed and negative. PHYSICAL EXAM:    /78   Pulse 92   Temp 99.6 °F (37.6 °C)   Resp 20   Ht 5' 2\" (1.575 m)   Wt 80 lb 12.8 oz (36.7 kg)   SpO2 98%   BMI 14.78 kg/m²     General appearance: Ill-appearing male patient  HEENT Normal cephalic, atraumatic without obvious deformity. Pupils equal, round, and reactive to light. Extra ocular muscles intact. Conjunctivae/corneas clear. Neck: Supple, No jugular venous distention/bruits. Trachea midline without thyromegaly or adenopathy with full range of motion. Lungs: Clear to auscultation, bilaterally without Rales/Wheezes/Rhonchi with good respiratory effort. Heart: Regular rate and rhythm with Normal S1/S2 without murmurs, rubs or gallops, point of maximum impulse non-displaced  Abdomen: Minimal abdominal tenderness   extremities: No clubbing, cyanosis, or edema bilaterally. Full range of motion without deformity and normal gait intact. Skin: Skin color, texture, turgor normal.  No rashes or lesions. Neurologic: Alert and oriented X 3, neurovascularly intact with sensory/motor intact upper extremities/lower extremities, bilaterally. Cranial nerves: II-XII intact, grossly non-focal.  Mental status: Alert, oriented, thought content appropriate.   Capillary Refill: Acceptable  < 3 seconds  Peripheral Pulses: +3 Easily felt, not easily obliterated with pressure    CBC   Recent Labs     09/01/22  0100   WBC 7.2   HGB 6.3*   HCT 19.0*         RENAL  Recent Labs     09/01/22  0100   *   K 3.8      CO2 22   BUN 39*   CREATININE <0.5*     LFT'S  Recent Labs     09/01/22 0138   AST 34   ALT 14   BILIDIR 1.4*   BILITOT 2.9*   ALKPHOS 95     COAG  Recent Labs     09/01/22 0138   INR 2.02*     CARDIAC ENZYMES  Recent Labs     09/01/22 0138   TROPONINI 0.01       U/A:    Lab Results   Component Value Date/Time    COLORU DK YELLOW 03/09/2019 04:00 PM    WBCUA 5 03/09/2019 04:00 PM    RBCUA 3-5 03/09/2019 04:00 PM

## 2022-09-01 NOTE — ED NOTES
Patient noted to be soiled in urine. Patient cleansed, clean brief applied and clean sheets placed on bed.       Olivia Daly RN  09/01/22 7408

## 2022-09-01 NOTE — OP NOTE
Endoscopy Note    Patient: Lorene Camarillo  : 1955  Acct#:     Procedure: Esophagogastroduodenoscopy with control of bleeding, variceal band ligation                         Date:  2022     Surgeon:   Ava Galicia MD    Referring Physician:  Referring Not In System (Inactive)    Indications: This is a 59-year-old female with past medical history of EtOH cirrhosis, chronic ascitic pancreatitis with pancreatic pseudocyst, DVT on Eliquis, and history of iron deficiency anemia due to presumed small bowel angiectasia's who presented [facility]"" AT [place] 2022 with melena. She presents for EGD. Postoperative Diagnosis:    2 cm tongue of salmon-colored mucosa extending from the proximal edge of the gastric folds 34 cm from the incisors, suggestive of short segment Norman's. The distal esophagus was examined with white light and narrowband imaging, but biopsies were not performed. Scarring and nonobstructive Schatzki's rings in the distal esophagus  Pigmented spot in the proximal hiatal hernia sac with active oozing. Due to concern for Jackson Ra erosion, a Hemoclip was placed over top of this lesion. Following Hemoclip placement, there was concern for a gastric varix column feeding this area. As a result, the colon was band ligated with a single band with decompression and hemostasis. Small sliding hiatal hernia  Severe portal hypertensive gastropathy  Nonbleeding duodenal erosions in the duodenal bulb    Anesthesia:  MAC    Consent:  The patient or their legal guardian has signed an informed consent, and is aware of the potential risks, benefits, alternatives, and potential complications of this procedure. These include, but are not limited to hemorrhage, bleeding, post procedural pain, perforation, phlebitis, aspiration, hypotension, hypoxia, cardiovascular events such as arryhthmia, and possibly death. Description of Procedure:  The patient was then taken to the endoscopy suite, placed in the left lateral decubitus position and the above IV sedation was administrered. The Olympus video endoscope was placed through the patient's oropharynx without difficulty to the extent of the 2nd portion of the duodenum. Both forward and retroflexed views of the stomach were obtained. Findings:    Esophagus: The esophagus was notable for a 2 cm tongue of salmon-colored mucosa, extending from the proximal edge of the gastric folds 34 cm from the incisors, suggestive of short segment Norman's. The distal esophagus was examined with white light and narrowband imaging, but biopsies were not performed. Scarring and nonobstructive Schatzki's rings were present in the distal esophagus. Stomach: The stomach was examined on forward and retroflexed views. A small sliding hiatal hernia was present. A pigmented spot was present a proximal hiatal hernia sac with active oozing. Due to concern for Benjamine Quinton erosion, Hemoclip was placed over top of the lesion. Following Hemoclip placement, additional oozing occurred. 1 mL of epinephrine was injected with ongoing bleeding. At that time. there was concern for a gastric varix column feeding this area and extending distally. As a result, a variceal band ligator was attached to the scope, advanced with some resistance at the UES into the esophagus, and the lesion as well as Hemoclip was captured with suction and a band was placed. Following band ligation, the colon decompressed and there was no further bleeding. Duodenum: The first and 2nd portions of the duodenum were notable for multiple nonbleeding duodenal erosions. Gastric and duodenal biopsies were not obtained. The third portion the duodenum appeared normal.    The scope was then withdrawn back into the stomach, it was decompressed, and the scope was completely withdrawn. The patient tolerated the procedure well and was taken to the post anesthesia care unit in good condition.     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 at this time. We will consider clear liquid diet tomorrow pending clinical course. - Repeat EGD in 2-4 weeks for further surveillance and possible biopsy of Norman's esophagus.     TYLER Petersen [address] [facility]

## 2022-09-01 NOTE — PROGRESS NOTES
Patient returned back to unit from EGD. Patient stable, drowsy but easy to arouse. Vital signs stable. Call light within reach, will continue to monitor.

## 2022-09-01 NOTE — H&P
Pre-operative History and Physical    Patient: Terese Beltre  : 1955  Acct#:     Intended Procedure:  EGD    HISTORY OF PRESENT ILLNESS:  The patient is a 79 y.o. female  who presents for EGD due to melena and acute blood loss anemia. Past Medical History:    No past medical history on file. Past Surgical History:        Procedure Laterality Date    COLONOSCOPY N/A 3/8/2019    COLONOSCOPY POLYPECTOMY HOT snare with clip placed performed by Jose Juan Pfeiffer DO at [address] [place] [address], SMALL BOWEL N/A 3/12/2019    ESOPHAGOGASTRODUODENOSCOPY WITH CAPSULE ENDOSCOPY DEPLOYMENT performed by Makayla Diego MD at [address] [place] [address] 3/6/2019    EGD W/EUS FNA performed by Makayla Diego MD at [address] [place] [address] 3/6/2019    EGD BIOPSY performed by Makayla Diego MD at [address] [place] [address] 3/6/2019    EGD DILATION BALLOON performed by Makayla Diego MD at [place] [address]  2019    ESOPHAGOGASTRODUODENOSCOPY WITH CAPSULE ENDOSCOPY DEPLOYMENT    UPPER GASTROINTESTINAL ENDOSCOPY  2019    ESOPHAGOGASTRODUODENOSCOPY WITH CAPSULE ENDOSCOPY DEPLOYMENT    UPPER GASTROINTESTINAL ENDOSCOPY N/A 3/12/2019    EGD DILATION BALLOON performed by Makayla Diego MD at [facility]     Medications Prior to Admission:   No current facility-administered medications on file prior to encounter.      Current Outpatient Medications on File Prior to Encounter   Medication Sig Dispense Refill    apixaban (ELIQUIS) 5 MG TABS tablet Take 1 tablet by mouth 2 times daily (Patient not taking: Reported on 2022) 60 tablet 0    lipase-protease-amylase (CREON) 96707-77258 units delayed release capsule Take 2 capsules by mouth 3 times daily (with meals) (Patient not taking: Reported on 2022) 180 capsule 0    furosemide (LASIX) 40 MG tablet Take 1 tablet by

## 2022-09-01 NOTE — ED NOTES
Patient's BP consistently soft. Perfect serve message sent to Dr. Carrillo Higgins as patient is admitted to [facility] and not appropriate level of care at this time.       Cherrie Lara RN  09/01/22 [id number]

## 2022-09-01 NOTE — ED NOTES
Patient got up to toilet and pulled IV tubing apart causing a approximately 50mL blood to the ground. Patient refuses to wear BP cuff or continuous O2 monitor is agreeable to heart monitor.   Patient is angry with this nurse and states she does not want this nurse providing her care anymore, charge and MD aware     Rafa Blas, RN  09/01/22 6730

## 2022-09-01 NOTE — ED TRIAGE NOTES
Patient reports 22 episodes of tarry stools since 0700 yesterday
I have reviewed and confirmed nurses' notes for patient's medications, allergies, medical history, and surgical history.

## 2022-09-01 NOTE — PROGRESS NOTES
Transported patient to ICU. Report given to [facility].   Transferred pt from stretcher to bed

## 2022-09-01 NOTE — ED NOTES
Perfect serve message sent to Dr. Mireya Recinos requesting to come see and reassess patient.      Gloria Hendrickson RN  09/01/22 1071

## 2022-09-02 ENCOUNTER — APPOINTMENT (OUTPATIENT)
Dept: ULTRASOUND IMAGING | Age: 67
DRG: 299 | End: 2022-09-02
Payer: MEDICARE

## 2022-09-02 LAB
A/G RATIO: 1.3 (ref 1.1–2.2)
ALBUMIN SERPL-MCNC: 3.1 G/DL (ref 3.4–5)
ALP BLD-CCNC: 98 U/L (ref 40–129)
ALT SERPL-CCNC: 16 U/L (ref 10–40)
ANION GAP SERPL CALCULATED.3IONS-SCNC: 10 MMOL/L (ref 3–16)
AST SERPL-CCNC: 49 U/L (ref 15–37)
BASOPHILS ABSOLUTE: 0 K/UL (ref 0–0.2)
BASOPHILS RELATIVE PERCENT: 0.9 %
BILIRUB SERPL-MCNC: 3.3 MG/DL (ref 0–1)
BILIRUBIN DIRECT: 1.5 MG/DL (ref 0–0.3)
BILIRUBIN, INDIRECT: 1.8 MG/DL (ref 0–1)
BUN BLDV-MCNC: 26 MG/DL (ref 7–20)
CALCIUM SERPL-MCNC: 8.1 MG/DL (ref 8.3–10.6)
CHLORIDE BLD-SCNC: 113 MMOL/L (ref 99–110)
CO2: 22 MMOL/L (ref 21–32)
CREAT SERPL-MCNC: 0.6 MG/DL (ref 0.6–1.2)
EOSINOPHILS ABSOLUTE: 0.1 K/UL (ref 0–0.6)
EOSINOPHILS RELATIVE PERCENT: 4.2 %
GFR AFRICAN AMERICAN: >60
GFR NON-AFRICAN AMERICAN: >60
GLUCOSE BLD-MCNC: 81 MG/DL (ref 70–99)
HBV SURFACE AB TITR SER: 120 MIU/ML
HCT VFR BLD CALC: 27.9 % (ref 36–48)
HCT VFR BLD CALC: 28.9 % (ref 36–48)
HEMOGLOBIN: 9.4 G/DL (ref 12–16)
HEMOGLOBIN: 9.7 G/DL (ref 12–16)
INR BLD: 1.6 (ref 0.87–1.14)
LYMPHOCYTES ABSOLUTE: 1.1 K/UL (ref 1–5.1)
LYMPHOCYTES RELATIVE PERCENT: 34.6 %
MAGNESIUM: 1.5 MG/DL (ref 1.8–2.4)
MCH RBC QN AUTO: 30.5 PG (ref 26–34)
MCHC RBC AUTO-ENTMCNC: 33.9 G/DL (ref 31–36)
MCV RBC AUTO: 90.1 FL (ref 80–100)
MONOCYTES ABSOLUTE: 0.3 K/UL (ref 0–1.3)
MONOCYTES RELATIVE PERCENT: 8.6 %
NEUTROPHILS ABSOLUTE: 1.7 K/UL (ref 1.7–7.7)
NEUTROPHILS RELATIVE PERCENT: 51.7 %
PDW BLD-RTO: 15.7 % (ref 12.4–15.4)
PLATELET # BLD: 83 K/UL (ref 135–450)
PLATELET SLIDE REVIEW: ABNORMAL
PMV BLD AUTO: 9 FL (ref 5–10.5)
POTASSIUM REFLEX MAGNESIUM: 3.1 MMOL/L (ref 3.5–5.1)
POTASSIUM SERPL-SCNC: 3.1 MMOL/L (ref 3.5–5.1)
PROTHROMBIN TIME: 19 SEC (ref 11.7–14.5)
RBC # BLD: 3.09 M/UL (ref 4–5.2)
SLIDE REVIEW: ABNORMAL
SODIUM BLD-SCNC: 145 MMOL/L (ref 136–145)
TOTAL PROTEIN: 5.5 G/DL (ref 6.4–8.2)
WBC # BLD: 3.2 K/UL (ref 4–11)

## 2022-09-02 PROCEDURE — 2580000003 HC RX 258: Performed by: INTERNAL MEDICINE

## 2022-09-02 PROCEDURE — 6360000002 HC RX W HCPCS: Performed by: STUDENT IN AN ORGANIZED HEALTH CARE EDUCATION/TRAINING PROGRAM

## 2022-09-02 PROCEDURE — 6360000002 HC RX W HCPCS: Performed by: INTERNAL MEDICINE

## 2022-09-02 PROCEDURE — 6360000002 HC RX W HCPCS: Performed by: HOSPITALIST

## 2022-09-02 PROCEDURE — 85025 COMPLETE CBC W/AUTO DIFF WBC: CPT

## 2022-09-02 PROCEDURE — 2580000003 HC RX 258: Performed by: HOSPITALIST

## 2022-09-02 PROCEDURE — 85610 PROTHROMBIN TIME: CPT

## 2022-09-02 PROCEDURE — C9113 INJ PANTOPRAZOLE SODIUM, VIA: HCPCS | Performed by: HOSPITALIST

## 2022-09-02 PROCEDURE — 85018 HEMOGLOBIN: CPT

## 2022-09-02 PROCEDURE — 82105 ALPHA-FETOPROTEIN SERUM: CPT

## 2022-09-02 PROCEDURE — 86706 HEP B SURFACE ANTIBODY: CPT

## 2022-09-02 PROCEDURE — 80053 COMPREHEN METABOLIC PANEL: CPT

## 2022-09-02 PROCEDURE — 6370000000 HC RX 637 (ALT 250 FOR IP): Performed by: HOSPITALIST

## 2022-09-02 PROCEDURE — 36415 COLL VENOUS BLD VENIPUNCTURE: CPT

## 2022-09-02 PROCEDURE — 2580000003 HC RX 258: Performed by: PHYSICIAN ASSISTANT

## 2022-09-02 PROCEDURE — 2060000000 HC ICU INTERMEDIATE R&B

## 2022-09-02 PROCEDURE — 94761 N-INVAS EAR/PLS OXIMETRY MLT: CPT

## 2022-09-02 PROCEDURE — 76705 ECHO EXAM OF ABDOMEN: CPT

## 2022-09-02 PROCEDURE — 86708 HEPATITIS A ANTIBODY: CPT

## 2022-09-02 PROCEDURE — 6360000002 HC RX W HCPCS: Performed by: PHYSICIAN ASSISTANT

## 2022-09-02 PROCEDURE — 99291 CRITICAL CARE FIRST HOUR: CPT | Performed by: INTERNAL MEDICINE

## 2022-09-02 PROCEDURE — 85014 HEMATOCRIT: CPT

## 2022-09-02 PROCEDURE — 2500000003 HC RX 250 WO HCPCS: Performed by: HOSPITALIST

## 2022-09-02 PROCEDURE — 83735 ASSAY OF MAGNESIUM: CPT

## 2022-09-02 RX ORDER — POTASSIUM CHLORIDE 7.45 MG/ML
10 INJECTION INTRAVENOUS PRN
Status: DISCONTINUED | OUTPATIENT
Start: 2022-09-02 | End: 2022-09-06 | Stop reason: HOSPADM

## 2022-09-02 RX ORDER — MAGNESIUM SULFATE IN WATER 40 MG/ML
2000 INJECTION, SOLUTION INTRAVENOUS ONCE
Status: COMPLETED | OUTPATIENT
Start: 2022-09-02 | End: 2022-09-02

## 2022-09-02 RX ADMIN — Medication 6 MG: at 22:26

## 2022-09-02 RX ADMIN — DIPHENHYDRAMINE HCL 25 MG: 25 TABLET ORAL at 19:51

## 2022-09-02 RX ADMIN — SODIUM CHLORIDE, PRESERVATIVE FREE 10 ML: 5 INJECTION INTRAVENOUS at 09:46

## 2022-09-02 RX ADMIN — POTASSIUM CHLORIDE 10 MEQ: 7.46 INJECTION, SOLUTION INTRAVENOUS at 13:14

## 2022-09-02 RX ADMIN — SODIUM CHLORIDE 8 MG/HR: 9 INJECTION, SOLUTION INTRAVENOUS at 04:37

## 2022-09-02 RX ADMIN — SODIUM CHLORIDE 8 MG/HR: 9 INJECTION, SOLUTION INTRAVENOUS at 17:01

## 2022-09-02 RX ADMIN — MAGNESIUM SULFATE HEPTAHYDRATE 2000 MG: 40 INJECTION, SOLUTION INTRAVENOUS at 10:53

## 2022-09-02 RX ADMIN — OCTREOTIDE ACETATE 25 MCG/HR: 500 INJECTION, SOLUTION INTRAVENOUS; SUBCUTANEOUS at 01:58

## 2022-09-02 RX ADMIN — ACETAMINOPHEN 650 MG: 325 TABLET, FILM COATED ORAL at 09:26

## 2022-09-02 RX ADMIN — POTASSIUM CHLORIDE 10 MEQ: 7.46 INJECTION, SOLUTION INTRAVENOUS at 10:50

## 2022-09-02 RX ADMIN — SODIUM CHLORIDE: 9 INJECTION, SOLUTION INTRAVENOUS at 21:27

## 2022-09-02 RX ADMIN — OCTREOTIDE ACETATE 25 MCG/HR: 500 INJECTION, SOLUTION INTRAVENOUS; SUBCUTANEOUS at 21:29

## 2022-09-02 RX ADMIN — POTASSIUM CHLORIDE 10 MEQ: 7.46 INJECTION, SOLUTION INTRAVENOUS at 09:39

## 2022-09-02 RX ADMIN — POTASSIUM CHLORIDE 10 MEQ: 7.46 INJECTION, SOLUTION INTRAVENOUS at 12:12

## 2022-09-02 RX ADMIN — VANCOMYCIN HYDROCHLORIDE 125 MG: 125 CAPSULE ORAL at 08:03

## 2022-09-02 RX ADMIN — CEFTRIAXONE 1000 MG: 1 INJECTION, POWDER, FOR SOLUTION INTRAMUSCULAR; INTRAVENOUS at 08:08

## 2022-09-02 RX ADMIN — METRONIDAZOLE 500 MG: 500 INJECTION, SOLUTION INTRAVENOUS at 00:37

## 2022-09-02 RX ADMIN — ACETAMINOPHEN 650 MG: 325 TABLET, FILM COATED ORAL at 16:44

## 2022-09-02 ASSESSMENT — PAIN SCALES - GENERAL
PAINLEVEL_OUTOF10: 10
PAINLEVEL_OUTOF10: 0

## 2022-09-02 ASSESSMENT — PAIN DESCRIPTION - FREQUENCY: FREQUENCY: CONTINUOUS

## 2022-09-02 ASSESSMENT — PAIN DESCRIPTION - ORIENTATION: ORIENTATION: MID

## 2022-09-02 ASSESSMENT — PAIN - FUNCTIONAL ASSESSMENT: PAIN_FUNCTIONAL_ASSESSMENT: ACTIVITIES ARE NOT PREVENTED

## 2022-09-02 ASSESSMENT — PAIN DESCRIPTION - DESCRIPTORS: DESCRIPTORS: DISCOMFORT;THROBBING

## 2022-09-02 ASSESSMENT — PAIN DESCRIPTION - ONSET: ONSET: ON-GOING

## 2022-09-02 ASSESSMENT — PAIN DESCRIPTION - PAIN TYPE: TYPE: ACUTE PAIN

## 2022-09-02 ASSESSMENT — PAIN DESCRIPTION - LOCATION: LOCATION: HEAD

## 2022-09-02 NOTE — ACP (ADVANCE CARE PLANNING)
Advance Care Planning     Advance Care Planning Activator (Inpatient)  Conversation Note      Date of ACP Conversation: 9/2/2022     Conversation Conducted with: Patient with Decision Making Capacity    ACP Activator: Loretta Quiles RN    Health Care Decision Maker:     Current Designated Health Care Decision Maker:     Primary Decision Maker: Regan Andrea Child - [phone]    Today we documented Decision Maker(s) consistent with Legal Next of Kin hierarchy. Care Preferences    Ventilation: \"If you were in your present state of health and suddenly became very ill and were unable to breathe on your own, what would your preference be about the use of a ventilator (breathing machine) if it were available to you? \"      Would the patient desire the use of ventilator (breathing machine)?: yes    \"If your health worsens and it becomes clear that your chance of recovery is unlikely, what would your preference be about the use of a ventilator (breathing machine) if it were available to you? \"     Would the patient desire the use of ventilator (breathing machine)?: No      Resuscitation  \"CPR works best to restart the heart when there is a sudden event, like a heart attack, in someone who is otherwise healthy. Unfortunately, CPR does not typically restart the heart for people who have serious health conditions or who are very sick. \"    \"In the event your heart stopped as a result of an underlying serious health condition, would you want attempts to be made to restart your heart (answer \"yes\" for attempt to resuscitate) or would you prefer a natural death (answer \"no\" for do not attempt to resuscitate)? \" yes       [] Yes   [] No   Educated Patient / Jamshid Vides regarding differences between Advance Directives and portable DNR orders.     Length of ACP Conversation in minutes:      Conversation Outcomes:  [x] ACP discussion completed  [] Existing advance directive reviewed with patient; no changes to patient's previously recorded wishes  [] New Advance Directive completed  [] Portable Do Not Rescitate prepared for Provider review and signature  [] POLST/POST/MOLST/MOST prepared for Provider review and signature      Follow-up plan:    [] Schedule follow-up conversation to continue planning  [] Referred individual to Provider for additional questions/concerns   [] Advised patient/agent/surrogate to review completed ACP document and update if needed with changes in condition, patient preferences or care setting    [] This note routed to one or more involved healthcare providers    #[phone]  Electronically signed by Ubaldo Samuels RN on 9/2/2022 at 2:12 PM

## 2022-09-02 NOTE — PROGRESS NOTES
Report called to nurse receiving patient in room 455 4203. Patient currently getting abdominal ultrasound done at bedside. Will transport patient to [facility] once ultrasound is complete.      Electronically signed by Nile Holt RN on 9/2/2022 at 1:11 PM

## 2022-09-02 NOTE — PROGRESS NOTES
INPATIENT CONSULTATION:    IDENTIFYING DATA/REASON FOR CONSULTATION   PATIENT:  Ivette Martins  MRN:  [id number]  ADMIT DATE: 9/1/2022  TIME OF EVALUATION: 9/2/2022 8:19 AM  HOSPITAL STAY:   LOS: 1 day   CONSULTING PHYSICIAN: Yoshi Simon MD   REASON FOR CONSULTATION: Upper GI bleed    Subjective:    Patient seen and examined in follow-up. Patient reports she is doing well. She had 1 small formed melenic bowel movement this morning. She denies any abdominal pain. She denies any nausea or vomiting. She is hungry and would like to tolerated diet. MEDICATIONS   SCHEDULED:  vancomycin, 125 mg, 4x Daily  metroNIDAZOLE, 500 mg, Q8H  sodium chloride flush, 5-40 mL, 2 times per day  cefTRIAXone (ROCEPHIN) IV, 1,000 mg, Q24H      FLUIDS/DRIPS:     pantoprazole 8 mg/hr (09/02/22 0619)    sodium chloride      sodium chloride 75 mL/hr at 09/02/22 0619    norepinephrine      sodium chloride      octreotide (SandoSTATIN) infusion 25 mcg/hr (09/02/22 5741)     PRNs: sodium chloride flush, 5-40 mL, PRN  sodium chloride, , PRN  ondansetron, 4 mg, Q8H PRN   Or  ondansetron, 4 mg, Q6H PRN  acetaminophen, 650 mg, Q6H PRN   Or  acetaminophen, 650 mg, Q6H PRN  sodium chloride, , PRN  melatonin, 6 mg, Nightly PRN  diphenhydrAMINE, 25 mg, Nightly PRN      ALLERGIES:  No Known Allergies      PHYSICAL EXAM     Vitals:    09/02/22 0500 09/02/22 0600 09/02/22 0700 09/02/22 0800   BP: 124/82 (!) 145/79 133/84 128/85   Pulse: 71 68 69 73   Resp: 22 21 19 17   Temp:    98.5 °F (36.9 °C)   TempSrc:    Oral   SpO2: 97% 96% 96% 98%   Weight:       Height:           I/O last 3 completed shifts:   In: 4026.1 [I.V.:1371.9; Blood:1325; IV Piggyback:1329.2]  Out: 225 [Urine:225]    Physical Exam:  Gen: Resting in bed, NAD  HEENT: Normocephalic, atraumatic, no scleral icterus   CV: RRR no MRG   Pul: CTAB, normal work of breathing without wheezing  Abd: Good bowel sounds throughout, no scars, soft, NT/ND, no masses, no HSM   Ext: No edema, moves all 4 extremities. Neuro: Moves all four extremities, no gross deficits, follows commands.   No asterixis  Skin: No jaundice, spider angiomas, palmar erythema    LABS AND IMAGING     Recent Results (from the past 24 hour(s))   Hemoglobin and Hematocrit    Collection Time: 09/01/22 11:31 AM   Result Value Ref Range    Hemoglobin 10.7 (L) 12.0 - 16.0 g/dL    Hematocrit 31.2 (L) 36.0 - 48.0 %   SPECIMEN REJECTION    Collection Time: 09/01/22  9:41 PM   Result Value Ref Range    Rejected Test [facility]     Reason for Rejection POSS CONTAM    Hemoglobin and Hematocrit    Collection Time: 09/01/22 10:41 PM   Result Value Ref Range    Hemoglobin 9.7 (L) 12.0 - 16.0 g/dL    Hematocrit 28.4 (L) 36.0 - 48.0 %   Comprehensive Metabolic Panel w/ Reflex to MG    Collection Time: 09/02/22  3:27 AM   Result Value Ref Range    Sodium 145 136 - 145 mmol/L    Potassium reflex Magnesium 3.1 (L) 3.5 - 5.1 mmol/L    Chloride 113 (H) 99 - 110 mmol/L    CO2 22 21 - 32 mmol/L    Anion Gap 10 3 - 16    Glucose 81 70 - 99 mg/dL    BUN 26 (H) 7 - 20 mg/dL    Creatinine 0.6 0.6 - 1.2 mg/dL    GFR Non-African American >60 >60    GFR African American >60 >60    Calcium 8.1 (L) 8.3 - 10.6 mg/dL    Total Protein 5.5 (L) 6.4 - 8.2 g/dL    Albumin 3.1 (L) 3.4 - 5.0 g/dL    Albumin/Globulin Ratio 1.3 1.1 - 2.2    Total Bilirubin 3.3 (H) 0.0 - 1.0 mg/dL    Alkaline Phosphatase 98 40 - 129 U/L    ALT 16 10 - 40 U/L    AST 49 (H) 15 - 37 U/L   CBC with Auto Differential    Collection Time: 09/02/22  3:27 AM   Result Value Ref Range    WBC 3.2 (L) 4.0 - 11.0 K/uL    RBC 3.09 (L) 4.00 - 5.20 M/uL    Hemoglobin 9.4 (L) 12.0 - 16.0 g/dL    Hematocrit 27.9 (L) 36.0 - 48.0 %    MCV 90.1 80.0 - 100.0 fL    MCH 30.5 26.0 - 34.0 pg    MCHC 33.9 31.0 - 36.0 g/dL    RDW 15.7 (H) 12.4 - 15.4 %    Platelets 83 (L) 950 - 450 K/uL    MPV 9.0 5.0 - 10.5 fL    PLATELET SLIDE REVIEW Decreased     SLIDE REVIEW see below     Neutrophils % 51.7 %    Lymphocytes % 34.6 % Monocytes % 8.6 %    Eosinophils % 4.2 %    Basophils % 0.9 %    Neutrophils Absolute 1.7 1.7 - 7.7 K/uL    Lymphocytes Absolute 1.1 1.0 - 5.1 K/uL    Monocytes Absolute 0.3 0.0 - 1.3 K/uL    Eosinophils Absolute 0.1 0.0 - 0.6 K/uL    Basophils Absolute 0.0 0.0 - 0.2 K/uL   Hepatic Function Panel    Collection Time: 09/02/22  3:27 AM   Result Value Ref Range    Bilirubin, Direct 1.5 (H) 0.0 - 0.3 mg/dL    Bilirubin, Indirect 1.8 (H) 0.0 - 1.0 mg/dL   Protime-INR    Collection Time: 09/02/22  3:27 AM   Result Value Ref Range    Protime 19.0 (H) 11.7 - 14.5 sec    INR 1.60 (H) 0.87 - 1.14   Hepatitis B Surface Antibody    Collection Time: 09/02/22  3:27 AM   Result Value Ref Range    Hep B S Ab 120.00 mIU/mL   Magnesium    Collection Time: 09/02/22  3:27 AM   Result Value Ref Range    Magnesium 1.50 (L) 1.80 - 2.40 mg/dL     Other Labs    Imaging:    . CTA ABDOMEN PELVIS W CONTRAST   Final Result   1. No evidence of active gastrointestinal bleed. 2. Acute colitis involving the cecum through the distal descending colon. The distribution is most typical for an infectious or inflammatory etiology. An ischemic etiology could be considered in the appropriate clinical setting   but is less favored. 3. Moderate to severe atherosclerotic plaque with areas of mild stenosis in   the SMA but no major branch occlusion. 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 4. Cirrhotic configuration of the liver with small volume ascites and upper   abdominal varices. 5. Sequela of chronic pancreatitis. 6. Bony demineralization. US ABDOMEN LIMITED Specify organ? LIVER    (Results Pending)     EGD 9/1/22:  2 cm tongue of salmon-colored mucosa extending from the proximal edge of the gastric folds 34 cm from the incisors, suggestive of short segment Norman's. The distal esophagus was examined with white light and narrowband imaging, but biopsies were not performed.   Scarring and nonobstructive Schatzki's rings in the distal esophagus  Pigmented spot in the proximal hiatal hernia sac with active oozing. Due to concern for [facility] erosion, a Hemoclip was placed over top of this lesion. Following Hemoclip placement, there was concern for a gastric varix column feeding this area. As a result, the colon was band ligated with a single band with decompression and hemostasis. Small sliding hiatal hernia  Severe portal hypertensive gastropathy  Nonbleeding duodenal erosions in the duodenal bulb    ASSESSMENT AND RECOMMENDATIONS   Ariadne Rivas is a 59-year-old female with past medical history of EtOH cirrhosis, chronic ascitic pancreatitis with pancreatic pseudocyst, DVT on Eliquis, and history of iron deficiency anemia due to presumed small bowel angiectasia's who presented [facility] [place] 9/1/2022 with melena. 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 Patient is on PPI and octreotide drip, which we will continue for 72 hours. Monitor H&H and transfuse to hemoglobin greater than 7. Continue ceftriaxone for SBP prophylaxis. Colitis: Patient with nonspecific colitis on CT. Recent colonoscopy in March 2019 without evidence of colitis. Given lack of diarrhea, suspect CT findings represent hypoalbuminemia and mucosal edema 2/2 liver disease. I would stop p.o. 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 Will need surveillance EGD in 4 weeks, and addition of Coreg at discharge. -Ascites: History of ascites 2/2 portal hypertension. Small volume ascites on CT and exam, not amenable to paracentesis. Continue ceftriaxone for SBP prophylaxis. Hold outpatient Lasix due to active GI bleed. -Immune status: Unknown  -[facility] [address] screening:  We will obtain right upper quadrant ultrasound  Chronic calcific pancreatitis  VTE on Eliquis: Hold Gabriella    RECOMMENDATIONS:    -Clear liquid diet  -Continue octreotide and PPI drip  -Ceftriaxone for 5 days for SBP prophylaxis  -Right upper quadrant ultrasound    If you have any questions or need any further information, please feel free to contact anyone on our consult team.  Thank you for allowing us to participate in the care of Alicia Vitale. Mustapha Tomlin.  [address]

## 2022-09-02 NOTE — PROGRESS NOTES
Hospitalist Progress Note      PCP: Referring Not In System (Inactive)    Date of Admission: 9/1/2022    Chief Complaint: Melena. Hospital Course:    79 y.o. female who presents to [facility] with melena. Patient with a past medical history of PE/DVT on Eliquis, liver cirrhosis (likely secondary to alcohol), patient presented with melena. Admitted with multiple episodes of melena, EGD 9/1, pigmented spot and hiatal hernia with active oozing, Hemoclip placed, ligated. Subjective:   Denies any new complaints, pleasant. Medications:  Reviewed    Infusion Medications    pantoprazole 8 mg/hr (09/02/22 0619)    sodium chloride      sodium chloride 75 mL/hr at 09/02/22 0619    norepinephrine      sodium chloride      octreotide (SandoSTATIN) infusion 25 mcg/hr (09/02/22 7099)     Scheduled Medications    vancomycin  125 mg Oral 4x Daily    metroNIDAZOLE  500 mg IntraVENous Q8H    sodium chloride flush  5-40 mL IntraVENous 2 times per day    cefTRIAXone (ROCEPHIN) IV  1,000 mg IntraVENous Q24H     PRN Meds: sodium chloride flush, sodium chloride, ondansetron **OR** ondansetron, acetaminophen **OR** acetaminophen, sodium chloride, melatonin, diphenhydrAMINE      Intake/Output Summary (Last 24 hours) at 9/2/2022 0828  Last data filed at 9/2/2022 5065  Gross per 24 hour   Intake 3001.14 ml   Output 125 ml   Net 2876.14 ml       Physical Exam Performed:    /85   Pulse 73   Temp 98.5 °F (36.9 °C) (Oral)   Resp 17   Ht 5' 2\" (1.575 m)   Wt 83 lb 12.4 oz (38 kg)   SpO2 98%   BMI 15.32 kg/m²     General appearance: No apparent distress, appears stated age and cooperative. HEENT: Pupils equal, round, and reactive to light. Conjunctivae/corneas clear. Neck: Supple, with full range of motion. No jugular venous distention. Trachea midline. Respiratory:  Normal respiratory effort. Clear to auscultation, bilaterally without Rales/Wheezes/Rhonchi.   Cardiovascular: Regular rate and rhythm with normal S1/S2 without murmurs, rubs or gallops. Abdomen: Soft, non-tender, non-distended with normal bowel sounds. Musculoskeletal: No clubbing, cyanosis or edema bilaterally. Full range of motion without deformity. Skin: Skin color, texture, turgor normal.  No rashes or lesions. Neurologic:  Neurovascularly intact without any focal sensory/motor deficits. Cranial nerves: II-XII intact, grossly non-focal.  Psychiatric: Alert and oriented, thought content appropriate, normal insight  Capillary Refill: Brisk, 3 seconds, normal   Peripheral Pulses: +2 palpable, equal bilaterally       Labs:   Recent Labs     09/01/22  0100 09/01/22  1131 09/01/22  2241 09/02/22  0327   WBC 7.2  --   --  3.2*   HGB 6.3* 10.7* 9.7* 9.4*   HCT 19.0* 31.2* 28.4* 27.9*     --   --  83*     Recent Labs     09/01/22  0100 09/02/22  0327   * 145   K 3.8 3.1*    113*   CO2 22 22   BUN 39* 26*   CREATININE <0.5* 0.6   CALCIUM 8.8 8.1*     Recent Labs     09/01/22  0138 09/02/22  0327   AST 34 49*   ALT 14 16   BILIDIR 1.4* 1.5*   BILITOT 2.9* 3.3*   ALKPHOS 95 98     Recent Labs     09/01/22  0138 09/02/22  0327   INR 2.02* 1.60*     Recent Labs     09/01/22  0138   TROPONINI 0.01       Urinalysis:      Lab Results   Component Value Date/Time    NITRU Negative 03/09/2019 04:00 PM    WBCUA 5 03/09/2019 04:00 PM    BACTERIA Rare 03/09/2019 04:00 PM    RBCUA 3-5 03/09/2019 04:00 PM    BLOODU Negative 03/09/2019 04:00 PM    SPECGRAV 1.026 03/09/2019 04:00 PM    GLUCOSEU Negative 03/09/2019 04:00 PM       Radiology:  CTA ABDOMEN PELVIS W CONTRAST   Final Result   1. No evidence of active gastrointestinal bleed. 2. Acute colitis involving the cecum through the distal descending colon. The distribution is most typical for an infectious or inflammatory etiology. An ischemic etiology could be considered in the appropriate clinical setting   but is less favored.    3. Moderate to severe atherosclerotic plaque with areas of

## 2022-09-02 NOTE — CONSULTS
Pulmonary Critical Care Consult Note     Patient's name:  Jessica Haque  Medical Record Number: [id number]  Patient's account/billing number: [de-identified]  Patient's YOB: 1955  Age: 79 y.o. Date of Admission: 9/1/2022 12:53 AM  Date of Consult: 9/2/2022      Primary Care Physician: Referring Not In System (Inactive)      Code Status: Full Code    Reason for consult: Melena    Assessment and Plan     Acute blood loss anemia  Pulmonary GI bleed  Esophageal varices status post banding  Alcoholic liver cirrhosis with ascites  History of DVT. Plan:  Monitor H&H transfuse to keep more than 7. Octreotide. PPI IV  Ceftriaxone. Replace lites. Follow-up ultrasound results. HISTORY OF PRESENT ILLNESS:   Mr./Ms. Jessica Haque is a 79 y.o. lady with past medical history stated below significant for alcoholic cirrhosis with history of ascites status post paracentesis in the past x2, history of chronic pancreatitis and pseudocyst, history of DVT on Eliquis, iron deficiency anemia, presented to [facility] with melena, found to have acute on chronic anemia, status post EGD with varices banding x2, severe portal hypertensive gastropathy and duodenal erosions. Past Medical History:  No past medical history on file.     Past Surgical History:        Procedure Laterality Date    COLONOSCOPY N/A 3/8/2019    COLONOSCOPY POLYPECTOMY HOT snare with clip placed performed by Marquita Hamilton DO at [address] [place] [address], SMALL BOWEL N/A 3/12/2019    ESOPHAGOGASTRODUODENOSCOPY WITH CAPSULE ENDOSCOPY DEPLOYMENT performed by Darby Rivero MD at [address] [place] [address] Dr Kaur 3/6/2019    EGD W/EUS FNA performed by Darby Rivero MD at [address] [place] [address] Dr Kaur 3/6/2019    EGD BIOPSY performed by Darby Rivero MD at [address] [place] [address] Dr Kaur 3/6/2019    EGD Paulette Shen MD   pantoprazole (PROTONIX) 40 MG tablet Take 1 tablet by mouth every morning (before breakfast)  Patient not taking: Reported on 9/1/2022 3/15/19   Paulette Shen MD   calcium carbonate (TUMS) 500 MG chewable tablet Take 1 tablet by mouth as needed for Heartburn    Historical Provider, MD   aspirin 81 MG tablet Take 81 mg by mouth daily  Patient not taking: Reported on 9/1/2022    Historical Provider, MD       Family History:   No family history on file. Social History:   TOBACCO:   reports that she has never smoked. She has never used smokeless tobacco.  ETOH:   reports that she does not currently use alcohol. DRUGS:  reports that she does not currently use drugs. REVIEW OF SYSTEMS:  Review of Systems -   General ROS: Hungry  Psychological ROS: negative  Ophthalmic ROS: negative  ENT ROS: negative  Allergy and Immunology ROS: negative  Hematological and Lymphatic ROS: negative  Endocrine ROS: negative  Breast ROS: negative  Respiratory ROS: no cough, shortness of breath, or wheezing  Cardiovascular ROS: no chest pain or dyspnea on exertion  Gastrointestinal ROS:negative  Genito-Urinary ROS: negative  Musculoskeletal ROS: negative  Neurological ROS: negative  Dermatological ROS: negative        Physical Exam:    Vitals: /85   Pulse 70   Temp 98.5 °F (36.9 °C) (Oral)   Resp 15   Ht 5' 2\" (1.575 m)   Wt 83 lb 12.4 oz (38 kg)   SpO2 97%   BMI 15.32 kg/m²     Last Body weight:   Wt Readings from Last 3 Encounters:   09/02/22 83 lb 12.4 oz (38 kg)   03/14/19 82 lb 10.8 oz (37.5 kg)       Body Mass Index : Body mass index is 15.32 kg/m². Intake and Output summary:   Intake/Output Summary (Last 24 hours) at 9/2/2022 0952  Last data filed at 9/2/2022 0945  Gross per 24 hour   Intake 3293.42 ml   Output 225 ml   Net 3068.42 ml       Physical Examination:     PHYSICAL EXAM:    Gen:  No acute distress. Eyes: PERRL. Anicteric sclera. No conjunctival injection.    ENT: No discharge. Posterior oropharynx clear. External appearance of ears and nose normal.  Neck: Trachea midline. No mass, no lymphadenopathy    Resp: Clear bilaterally no active wheezing rales or rhonchi. CV: Regular rate. Regular rhythm. No murmur or rub. No edema. GI: Soft, Non-tender. Slightly distended. +BS  Skin: Warm, dry, w/o erythema. Lymph: No cervical or supraclavicular LAD. M/S: No cyanosis. No clubbing. Neuro:  CN 2-12 tested, no focal neurologic deficit. Moves all extremities  Psych: Awake and alert, Oriented x 3. Judgement and insight appropriate. Mood stable. Laboratory findings:-    CBC:   Recent Labs     09/02/22 0327   WBC 3.2*   HGB 9.4*   PLT 83*     BMP:    Recent Labs     09/01/22  0100 09/02/22 0327   * 145   K 3.8 3.1*  3.1*    113*   CO2 22 22   BUN 39* 26*   CREATININE <0.5* 0.6   GLUCOSE 115* 81     S. Calcium:  Recent Labs     09/02/22 0327   CALCIUM 8.1*     S. Ionized Calcium:No results for input(s): IONCA in the last 72 hours. S. Magnesium:  Recent Labs     09/02/22 0327   MG 1.50*     S. Phosphorus:No results for input(s): PHOS in the last 72 hours. S. Glucose:No results for input(s): POCGLU in the last 72 hours. Glycosylated hemoglobin A1C: No results for input(s): LABA1C in the last 72 hours. INR:   Recent Labs     09/02/22 0327   INR 1.60*     Hepatic functions:   Recent Labs     09/02/22 0327   ALKPHOS 98   ALT 16   AST 49*   PROT 5.5*   BILITOT 3.3*   BILIDIR 1.5*   LABALBU 3.1*       Cardiac enzymes:  Recent Labs     09/01/22  0138   TROPONINI 0.01     Thyroid functions:   Lab Results   Component Value Date/Time    TSH 0.12 03/05/2019 06:45 AM          Radiology Review:  Pertinent images / reports were reviewed as a part of this visit.     CXR portable: Results for orders placed during the hospital encounter of 03/04/19    XR CHEST PORTABLE    Narrative  EXAMINATION:  SINGLE XRAY VIEW OF THE CHEST    3/7/2019 3:45 pm    COMPARISON:  Chest radiograph 03/04/2019 and CT chest 03/05/2019    HISTORY:  ORDERING SYSTEM PROVIDED HISTORY: PICC insertion  TECHNOLOGIST PROVIDED HISTORY:  Reason for exam:-> PICC insertion    Inadequate peripheral venous access; requires central venous access for  medication administration. FINDINGS:  Calcifications involving the aorta reflect atherosclerosis. The  cardiomediastinal and hilar silhouettes appear otherwise unremarkable. Chronic appearing coarse interstitial densities predominate parahilar regions  and lung bases, typical of sequela from smoking or other previous  infectious/inflammatory process. The lungs are hyperinflated with mild  flattening of the hemidiaphragms, increased translucency both lung apices and  tapering of the vasculature in the upper lungs. No focal consolidation or  pleural effusion is evident. No pneumothorax is seen. No acute osseous  abnormality is identified. Right upper extremity PICC tip overlies the right atrium level. Impression  No acute pulmonary disease. Calcific atherosclerotic disease aorta. Right upper extremity PICC tip overlies the right atrium level. Pulmonary sequela typical of that seen with smoking, including emphysema. Correlate with clinical history.     Critical Care time 35 min       Dominique Cm MD, MLISSETTE.            9/2/2022, 9:53 AM

## 2022-09-02 NOTE — PROGRESS NOTES
Patient transported to [address] [place] [address] via wheelchair with PCA and this RN. Belongings sent with patient.     Electronically signed by Courtney Astudillo RN on 9/2/2022 at 1:35 PM

## 2022-09-02 NOTE — PROGRESS NOTES
Comprehensive Nutrition Assessment    Type and Reason for Visit:  Positive Nutrition Screen    Nutrition Recommendations/Plan:   Start nutrition when able per MD.  Add ONS when diet advances. Malnutrition Assessment:  Malnutrition Status:  Severe malnutrition (09/02/22 1307)    Context:  Chronic Illness     Findings of the 6 clinical characteristics of malnutrition:  Energy Intake:  Unable to assess  Weight Loss:  Unable to assess     Body Fat Loss:  Severe body fat loss Triceps   Muscle Mass Loss:  Severe muscle mass loss Temples (temporalis), Clavicles (pectoralis & deltoids)  Fluid Accumulation:  No significant fluid accumulation     Strength:  Not Performed    Nutrition Assessment:    + nutrition screen. Pt with PHM of liver cirrhosis, presented with melena. Pt s/p EGD yesterday with esophageal banding and ligation. CT showed colitis. Pt currently NPO. Will monitor ability to advance diet and add ONS. Pt with low BMI for age and limited wt hx in EMR. Nutrition Related Findings:    BM 9/2; no edema noted Wound Type: None       Current Nutrition Intake & Therapies:    Average Meal Intake: NPO  Average Supplements Intake: NPO  Diet NPO Exceptions are: Sips of Water with Meds    Anthropometric Measures:  Height: 5' 3\" (160 cm)  Ideal Body Weight (IBW): 115 lbs (52 kg)       Current Body Weight: 83 lb (37.6 kg), 72.2 % IBW.  Weight Source: Bed Scale  Current BMI (kg/m2): 14.7                          BMI Categories: Underweight (BMI less than 22) age over 72    Estimated Daily Nutrient Needs:        Energy (kcal/day): 1140 -1330 kcal (30-35 kcal/kg 38 kg CBW)     Protein (g/day): > 57 g (1.5 gm/kg 38 kg CBW)     Fluid (ml/day): per provider    Nutrition Diagnosis:   Severe malnutrition related to inadequate protein-energy intake as evidenced by severe loss of subcutaneous fat, severe muscle loss, NPO or clear liquid status due to medical condition, BMI    Nutrition Interventions:   Food and/or Nutrient Delivery:  (Monitor for start of nutrition)  Nutrition Education/Counseling: No recommendation at this time  Coordination of Nutrition Care: Continue to monitor while inpatient       Goals:     Goals: Initiate PO diet, within 2 days       Nutrition Monitoring and Evaluation:      Food/Nutrient Intake Outcomes: Diet Advancement/Tolerance  Physical Signs/Symptoms Outcomes: Biochemical Data, Weight, Nutrition Focused Physical Findings, Skin, GI Status    Discharge Planning:     Too soon to determine     Tracie Hashimoto, RD, LD  Contact: [phone]

## 2022-09-02 NOTE — CARE COORDINATION
INITIAL CASE MANAGEMENT ASSESSMENT    Met with patient to assess possible discharge needs. Explained Case Management role/services. 09/02/22 1413   Service Assessment   Patient Orientation Alert and Oriented;Person;Place;Situation;Self   Cognition Alert   History Provided By Patient   Primary Caregiver Self   Support Systems Children;Family Members   Patient's Healthcare Decision Maker is: Legal Next of Kin   PCP Verified by CM Yes   Last Visit to PCP Within last 6 months   Prior Functional Level Independent in ADLs/IADLs   Current Functional Level Independent in ADLs/IADLs   Can patient return to prior living arrangement Yes   Ability to make needs known: Good   Family able to assist with home care needs: Yes   Would you like for me to discuss the discharge plan with any other family members/significant others, and if so, who? No   Social/Functional History   Lives With Alone   Type of Home Apartment   Home Layout One level   Home Access Stairs to enter without rails   Entrance Stairs - Number of Steps [id number], [address] [facility] Responsibilities Yes   Ambulation Assistance Independent   Transfer Assistance Independent   Active  Yes   Discharge Planning   Type of Residence Apartment   Living Arrangements Alone   Current Services Prior To Admission None   Potential Assistance Needed Durable Medical Equipment  (shower chair)   DME Ordered? No   Potential Assistance Purchasing Medications No   Type of Home Care Services None   Patient expects to be discharged to: Apartment   One/Two Story Residence One story   Services At/After Discharge   Services At/After Discharge None       MEDICATIONS:[facility] in [place] on [address]    PT/OT RECS:No orders at this time    PLAN/COMMENTS: Patient plans to return home alone. Family can transport.   Denied any discharge needs. Provided contact information for patient or family to call with any questions. Will follow and assist as needed.     #[phone]  Electronically signed by Beka Colón RN on 9/2/2022 at 2:17 PM

## 2022-09-03 LAB
A/G RATIO: 1.6 (ref 1.1–2.2)
AFP: 2.6 UG/L
ALBUMIN SERPL-MCNC: 3.1 G/DL (ref 3.4–5)
ALP BLD-CCNC: 93 U/L (ref 40–129)
ALT SERPL-CCNC: 17 U/L (ref 10–40)
ANION GAP SERPL CALCULATED.3IONS-SCNC: 10 MMOL/L (ref 3–16)
AST SERPL-CCNC: 44 U/L (ref 15–37)
BASOPHILS ABSOLUTE: 0 K/UL (ref 0–0.2)
BASOPHILS RELATIVE PERCENT: 1 %
BILIRUB SERPL-MCNC: 2.1 MG/DL (ref 0–1)
BILIRUBIN DIRECT: 1.1 MG/DL (ref 0–0.3)
BILIRUBIN, INDIRECT: 1 MG/DL (ref 0–1)
BUN BLDV-MCNC: 17 MG/DL (ref 7–20)
C DIFF TOXIN/ANTIGEN: NORMAL
CALCIUM SERPL-MCNC: 7.9 MG/DL (ref 8.3–10.6)
CHLORIDE BLD-SCNC: 108 MMOL/L (ref 99–110)
CO2: 21 MMOL/L (ref 21–32)
CREAT SERPL-MCNC: <0.5 MG/DL (ref 0.6–1.2)
EOSINOPHILS ABSOLUTE: 0.2 K/UL (ref 0–0.6)
EOSINOPHILS RELATIVE PERCENT: 4.2 %
GFR AFRICAN AMERICAN: >60
GFR NON-AFRICAN AMERICAN: >60
GLUCOSE BLD-MCNC: 102 MG/DL (ref 70–99)
HAV AB SERPL IA-ACNC: POSITIVE
HCT VFR BLD CALC: 29.2 % (ref 36–48)
HCT VFR BLD CALC: 29.7 % (ref 36–48)
HCT VFR BLD CALC: 31.6 % (ref 36–48)
HEMOGLOBIN: 10 G/DL (ref 12–16)
HEMOGLOBIN: 10.6 G/DL (ref 12–16)
HEMOGLOBIN: 9.8 G/DL (ref 12–16)
INR BLD: 1.59 (ref 0.87–1.14)
LYMPHOCYTES ABSOLUTE: 0.9 K/UL (ref 1–5.1)
LYMPHOCYTES RELATIVE PERCENT: 21.3 %
MAGNESIUM: 1.8 MG/DL (ref 1.8–2.4)
MCH RBC QN AUTO: 30.8 PG (ref 26–34)
MCHC RBC AUTO-ENTMCNC: 33.5 G/DL (ref 31–36)
MCV RBC AUTO: 91.8 FL (ref 80–100)
MONOCYTES ABSOLUTE: 0.4 K/UL (ref 0–1.3)
MONOCYTES RELATIVE PERCENT: 8.9 %
NEUTROPHILS ABSOLUTE: 2.9 K/UL (ref 1.7–7.7)
NEUTROPHILS RELATIVE PERCENT: 64.6 %
PDW BLD-RTO: 16 % (ref 12.4–15.4)
PLATELET # BLD: 84 K/UL (ref 135–450)
PMV BLD AUTO: 8.6 FL (ref 5–10.5)
POTASSIUM REFLEX MAGNESIUM: 3.4 MMOL/L (ref 3.5–5.1)
POTASSIUM SERPL-SCNC: 3.4 MMOL/L (ref 3.5–5.1)
PROTHROMBIN TIME: 18.9 SEC (ref 11.7–14.5)
RBC # BLD: 3.24 M/UL (ref 4–5.2)
SODIUM BLD-SCNC: 139 MMOL/L (ref 136–145)
TOTAL PROTEIN: 5 G/DL (ref 6.4–8.2)
WBC # BLD: 4.4 K/UL (ref 4–11)

## 2022-09-03 PROCEDURE — 85018 HEMOGLOBIN: CPT

## 2022-09-03 PROCEDURE — 6360000002 HC RX W HCPCS: Performed by: STUDENT IN AN ORGANIZED HEALTH CARE EDUCATION/TRAINING PROGRAM

## 2022-09-03 PROCEDURE — 36415 COLL VENOUS BLD VENIPUNCTURE: CPT

## 2022-09-03 PROCEDURE — 6360000002 HC RX W HCPCS: Performed by: HOSPITALIST

## 2022-09-03 PROCEDURE — 6360000002 HC RX W HCPCS: Performed by: INTERNAL MEDICINE

## 2022-09-03 PROCEDURE — 85025 COMPLETE CBC W/AUTO DIFF WBC: CPT

## 2022-09-03 PROCEDURE — 2580000003 HC RX 258: Performed by: PHYSICIAN ASSISTANT

## 2022-09-03 PROCEDURE — 6370000000 HC RX 637 (ALT 250 FOR IP): Performed by: HOSPITALIST

## 2022-09-03 PROCEDURE — 2580000003 HC RX 258: Performed by: INTERNAL MEDICINE

## 2022-09-03 PROCEDURE — C9113 INJ PANTOPRAZOLE SODIUM, VIA: HCPCS | Performed by: HOSPITALIST

## 2022-09-03 PROCEDURE — 6370000000 HC RX 637 (ALT 250 FOR IP): Performed by: STUDENT IN AN ORGANIZED HEALTH CARE EDUCATION/TRAINING PROGRAM

## 2022-09-03 PROCEDURE — 87425 ROTAVIRUS AG IA: CPT

## 2022-09-03 PROCEDURE — 94760 N-INVAS EAR/PLS OXIMETRY 1: CPT

## 2022-09-03 PROCEDURE — 87324 CLOSTRIDIUM AG IA: CPT

## 2022-09-03 PROCEDURE — 87328 CRYPTOSPORIDIUM AG IA: CPT

## 2022-09-03 PROCEDURE — 6360000002 HC RX W HCPCS: Performed by: PHYSICIAN ASSISTANT

## 2022-09-03 PROCEDURE — 80053 COMPREHEN METABOLIC PANEL: CPT

## 2022-09-03 PROCEDURE — 87336 ENTAMOEB HIST DISPR AG IA: CPT

## 2022-09-03 PROCEDURE — 2580000003 HC RX 258: Performed by: HOSPITALIST

## 2022-09-03 PROCEDURE — 85610 PROTHROMBIN TIME: CPT

## 2022-09-03 PROCEDURE — 2060000000 HC ICU INTERMEDIATE R&B

## 2022-09-03 PROCEDURE — 87449 NOS EACH ORGANISM AG IA: CPT

## 2022-09-03 PROCEDURE — 83735 ASSAY OF MAGNESIUM: CPT

## 2022-09-03 PROCEDURE — 87506 IADNA-DNA/RNA PROBE TQ 6-11: CPT

## 2022-09-03 PROCEDURE — 85014 HEMATOCRIT: CPT

## 2022-09-03 RX ORDER — ZOLPIDEM TARTRATE 5 MG/1
5 TABLET ORAL NIGHTLY PRN
Status: DISCONTINUED | OUTPATIENT
Start: 2022-09-03 | End: 2022-09-06 | Stop reason: HOSPADM

## 2022-09-03 RX ORDER — PANTOPRAZOLE SODIUM 40 MG/1
40 TABLET, DELAYED RELEASE ORAL
Status: DISCONTINUED | OUTPATIENT
Start: 2022-09-03 | End: 2022-09-06 | Stop reason: HOSPADM

## 2022-09-03 RX ADMIN — ACETAMINOPHEN 650 MG: 325 TABLET, FILM COATED ORAL at 03:32

## 2022-09-03 RX ADMIN — POTASSIUM CHLORIDE 10 MEQ: 7.46 INJECTION, SOLUTION INTRAVENOUS at 06:20

## 2022-09-03 RX ADMIN — OCTREOTIDE ACETATE 25 MCG/HR: 500 INJECTION, SOLUTION INTRAVENOUS; SUBCUTANEOUS at 19:48

## 2022-09-03 RX ADMIN — ACETAMINOPHEN 650 MG: 325 TABLET, FILM COATED ORAL at 14:03

## 2022-09-03 RX ADMIN — POTASSIUM CHLORIDE 10 MEQ: 7.46 INJECTION, SOLUTION INTRAVENOUS at 10:17

## 2022-09-03 RX ADMIN — ZOLPIDEM TARTRATE 5 MG: 5 TABLET ORAL at 21:04

## 2022-09-03 RX ADMIN — POTASSIUM CHLORIDE 10 MEQ: 7.46 INJECTION, SOLUTION INTRAVENOUS at 08:59

## 2022-09-03 RX ADMIN — PANTOPRAZOLE SODIUM 40 MG: 40 TABLET, DELAYED RELEASE ORAL at 15:40

## 2022-09-03 RX ADMIN — CEFTRIAXONE 1000 MG: 1 INJECTION, POWDER, FOR SOLUTION INTRAMUSCULAR; INTRAVENOUS at 08:56

## 2022-09-03 RX ADMIN — SODIUM CHLORIDE 8 MG/HR: 9 INJECTION, SOLUTION INTRAVENOUS at 06:17

## 2022-09-03 ASSESSMENT — PAIN DESCRIPTION - LOCATION
LOCATION: HEAD
LOCATION: HEAD

## 2022-09-03 ASSESSMENT — PAIN DESCRIPTION - DESCRIPTORS
DESCRIPTORS: ACHING
DESCRIPTORS: ACHING

## 2022-09-03 ASSESSMENT — PAIN - FUNCTIONAL ASSESSMENT: PAIN_FUNCTIONAL_ASSESSMENT: ACTIVITIES ARE NOT PREVENTED

## 2022-09-03 ASSESSMENT — PAIN SCALES - GENERAL
PAINLEVEL_OUTOF10: 3
PAINLEVEL_OUTOF10: 3
PAINLEVEL_OUTOF10: 1

## 2022-09-03 ASSESSMENT — PAIN DESCRIPTION - ORIENTATION
ORIENTATION: INNER
ORIENTATION: MID

## 2022-09-03 NOTE — PROGRESS NOTES
Left AC PIV pulled out accidentally as patient getting up to [facility] per self. Cleaned site and dressed. Cleaned arm, legs, torso with bath wipes. Clean gown and full set of sheets given. PIV to left FA began leaking small drops at this time. Patient aggravated at this nurse for the way I was holding her arm stating \"you are squeezing my arm. \" Call placed to charge nurse to get an US guided IV placed. Quinton Vargas RN

## 2022-09-03 NOTE — PROGRESS NOTES
Patient ripped out her most distal IV in her left arm. She is refusing to allow insertion of a new IV. Patient put IV catheter and tubing in commode on top of stool needed for lab collection. Patient states that she is tired of the IVs and does not want anymore. MD notified. Switching Protonix to oral and potassium to oral if needed. Patient did agree to receive the rest of her Sandostatin infusion and said she would leave her IV in place as long as it does not beep at all. This RN will monitor IV and site for removed IV.     Electronically signed by Celso Lozoya on 9/3/2022 at 12:32 PM

## 2022-09-03 NOTE — PROGRESS NOTES
[facility] started to [facility] every few minutes. Have gone into room multiple times to check line, site, and reset pump. Removed dressing and replaced and blood return noted. Taped for support. Patient trying to sleep after taking Benadryl and melatonin for sleep given and IV has continued to beep frequently. Has requested to have IV removed. Wants medication PO or some other route. Explained the reason for the two drips and that not compatible. Refusing to have another PIV placed. Had one in hand that went bad earlier in the day. Will continue to monitor. Arnaud George, RN

## 2022-09-03 NOTE — PROGRESS NOTES
Hospitalist Progress Note      PCP: Krunal Dunn RN, NP    Date of Admission: 9/1/2022    Chief Complaint: Melena. Hospital Course:    79 y.o. female who presents to [facility] with melena. Patient with a past medical history of PE/DVT on Eliquis, liver cirrhosis (likely secondary to alcohol), patient presented with melena. Admitted with multiple episodes of melena, EGD 9/1, pigmented spot and hiatal hernia with active oozing, Hemoclip placed, ligated. Subjective:   Patient frustrated on being on IV infusions. Denies any new complaints    Medications:  Reviewed    Infusion Medications    sodium chloride      sodium chloride      octreotide (SandoSTATIN) infusion 25 mcg/hr (09/03/22 0925)     Scheduled Medications    pantoprazole  40 mg Oral BID AC    sodium chloride flush  5-40 mL IntraVENous 2 times per day    cefTRIAXone (ROCEPHIN) IV  1,000 mg IntraVENous Q24H     PRN Meds: potassium chloride, sodium chloride flush, sodium chloride, ondansetron **OR** ondansetron, acetaminophen **OR** acetaminophen, sodium chloride, melatonin, diphenhydrAMINE      Intake/Output Summary (Last 24 hours) at 9/3/2022 1254  Last data filed at 9/3/2022 6450  Gross per 24 hour   Intake 2571.04 ml   Output 500 ml   Net 2071.04 ml         Physical Exam Performed:    BP (!) 159/90   Pulse 73   Temp 97.7 °F (36.5 °C) (Oral)   Resp 19   Ht 5' 3\" (1.6 m)   Wt 88 lb 6.5 oz (40.1 kg)   SpO2 92%   BMI 15.66 kg/m²     General appearance: No apparent distress, appears stated age and cooperative. HEENT: Pupils equal, round, and reactive to light. Conjunctivae/corneas clear. Neck: Supple, with full range of motion. No jugular venous distention. Trachea midline. Respiratory:  Normal respiratory effort. Clear to auscultation, bilaterally without Rales/Wheezes/Rhonchi. Cardiovascular: Regular rate and rhythm with normal S1/S2 without murmurs, rubs or gallops.   Abdomen: Soft, non-tender, non-distended with normal bowel sounds. Musculoskeletal: No clubbing, cyanosis or edema bilaterally. Full range of motion without deformity. Skin: Skin color, texture, turgor normal.  No rashes or lesions. Neurologic:  Neurovascularly intact without any focal sensory/motor deficits. Cranial nerves: II-XII intact, grossly non-focal.  Psychiatric: Alert and oriented, thought content appropriate, normal insight  Capillary Refill: Brisk, 3 seconds, normal   Peripheral Pulses: +2 palpable, equal bilaterally       Labs:   Recent Labs     09/01/22  0100 09/01/22  1131 09/02/22  0327 09/02/22  1628 09/03/22  0402   WBC 7.2  --  3.2*  --  4.4   HGB 6.3*   < > 9.4* 9.7* 10.0*  9.8*   HCT 19.0*   < > 27.9* 28.9* 29.7*  29.2*     --  83*  --  84*    < > = values in this interval not displayed. Recent Labs     09/01/22  0100 09/02/22  0327 09/03/22  0402   * 145 139   K 3.8 3.1*  3.1* 3.4*  3.4*    113* 108   CO2 22 22 21   BUN 39* 26* 17   CREATININE <0.5* 0.6 <0.5*   CALCIUM 8.8 8.1* 7.9*       Recent Labs     09/01/22  0138 09/02/22  0327 09/03/22  0402   AST 34 49* 44*   ALT 14 16 17   BILIDIR 1.4* 1.5* 1.1*   BILITOT 2.9* 3.3* 2.1*   ALKPHOS 95 98 93       Recent Labs     09/01/22  0138 09/02/22  0327 09/03/22  0402   INR 2.02* 1.60* 1.59*       Recent Labs     09/01/22 0138   TROPONINI 0.01         Urinalysis:      Lab Results   Component Value Date/Time    NITRU Negative 03/09/2019 04:00 PM    WBCUA 5 03/09/2019 04:00 PM    BACTERIA Rare 03/09/2019 04:00 PM    RBCUA 3-5 03/09/2019 04:00 PM    BLOODU Negative 03/09/2019 04:00 PM    SPECGRAV 1.026 03/09/2019 04:00 PM    GLUCOSEU Negative 03/09/2019 04:00 PM       Radiology:  [address] organ? LIVER   Final Result   Cirrhosis with ascites and nonspecific gallbladder wall thickening. CTA ABDOMEN PELVIS W CONTRAST   Final Result   1. No evidence of active gastrointestinal bleed.    2. Acute colitis involving the cecum through the distal descending colon. The distribution is most typical for an infectious or inflammatory etiology. An ischemic etiology could be considered in the appropriate clinical setting   but is less favored. 3. Moderate to severe atherosclerotic plaque with areas of mild stenosis in   the SMA but no major branch occlusion. 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 4. Cirrhotic configuration of the liver with small volume ascites and upper   abdominal varices. 5. Sequela of chronic pancreatitis. 6. Bony demineralization. Assessment/Plan:    Active Hospital Problems    Diagnosis     Pancolitis ([facility] [address] [K51.00]      Priority: Medium    Severe malnutrition ([facility] [address] [E43]      Melena  GI bleeding  Patient presented to emergency with more than 20 episodes of melena, due to history of cirrhosis concern for possible variceal bleed. EGD 9/1, pigmented spot and hiatal hernia with active oozing, Hemoclip placed, ligated. Plan. -Appreciate GI input  -Progress diet to regular (low-fat)   -H&H.   -Continue octreotide infusion  -Patient declined reinsertion of a second IV cannula, will switch pantoprazole infusion to oral     Acute blood loss anemia  Transfuse 1 unit  Continue to follow hb.   10--> 9.4--> 9.8     Hypokalemia. .   Replace. Liver cirrhosis  No ascites the time being, continue ceftriaxone for SBP prophylaxis. Colitis  Nonspecific colitis noted on CT scan  Appreciate GI input     History of DVT  Hold Eliquis for now    DVT Prophylaxis: SCDs  Diet: ADULT DIET; Regular; Low Fat/Low Chol/High Fiber/2 gm Na  Code Status: Full Code  PT/OT Eval Status: baseline.      Dispo - pending clinical improvement      Danna Louis MD

## 2022-09-03 NOTE — PROGRESS NOTES
INPATIENT CONSULTATION:    IDENTIFYING DATA/REASON FOR CONSULTATION   PATIENT:  Charlie Burns  MRN:  [id number]  ADMIT DATE: 9/1/2022  TIME OF EVALUATION: 9/3/2022 9:54 AM  HOSPITAL STAY:   LOS: 2 days   CONSULTING PHYSICIAN: Pearlene Lesch, MD   REASON FOR CONSULTATION: Upper GI bleed    Subjective:    Patient seen and examined in follow-up. Patient reports she is doing well. She had 2-3 loose melenic bowel movements yesterday, and 3 loose brownish-black stools this morning. She is hungry and would like a regular diet. MEDICATIONS   SCHEDULED:  sodium chloride flush, 5-40 mL, 2 times per day  cefTRIAXone (ROCEPHIN) IV, 1,000 mg, Q24H    FLUIDS/DRIPS:     pantoprazole 8 mg/hr (09/03/22 0925)    sodium chloride      sodium chloride      octreotide (SandoSTATIN) infusion 25 mcg/hr (09/03/22 0925)     PRNs: potassium chloride, 10 mEq, PRN  sodium chloride flush, 5-40 mL, PRN  sodium chloride, , PRN  ondansetron, 4 mg, Q8H PRN   Or  ondansetron, 4 mg, Q6H PRN  acetaminophen, 650 mg, Q6H PRN   Or  acetaminophen, 650 mg, Q6H PRN  sodium chloride, , PRN  melatonin, 6 mg, Nightly PRN  diphenhydrAMINE, 25 mg, Nightly PRN    ALLERGIES:  No Known Allergies      PHYSICAL EXAM     Vitals:    09/03/22 0547 09/03/22 0751 09/03/22 0845 09/03/22 0915   BP:   (!) 159/90    Pulse:   67 73   Resp:   16 19   Temp:   97.7 °F (36.5 °C)    TempSrc:   Oral    SpO2:  95% 92%    Weight: 88 lb 6.5 oz (40.1 kg)      Height:           I/O last 3 completed shifts: In: 3584.3 [P.O.:120; I.V.:2879.9; IV Piggyback:584.4]  Out: 775 [Urine:775]    Physical Exam:  Gen: Resting in bed, NAD  HEENT: Normocephalic, atraumatic, no scleral icterus   CV: RRR no MRG   Pul: CTAB, normal work of breathing without wheezing  Abd: Good bowel sounds throughout, no scars, soft, NT/ND, no masses, no HSM   Ext: No edema, moves all 4 extremities. Neuro: Moves all four extremities, no gross deficits, follows commands.   No asterixis  Skin: No jaundice, spider angiomas, palmar erythema    LABS AND IMAGING     Recent Results (from the past 24 hour(s))   Hemoglobin and Hematocrit    Collection Time: 09/02/22  4:28 PM   Result Value Ref Range    Hemoglobin 9.7 (L) 12.0 - 16.0 g/dL    Hematocrit 28.9 (L) 36.0 - 48.0 %   Hemoglobin and Hematocrit    Collection Time: 09/03/22  4:02 AM   Result Value Ref Range    Hemoglobin 9.8 (L) 12.0 - 16.0 g/dL    Hematocrit 29.2 (L) 36.0 - 48.0 %   Comprehensive Metabolic Panel w/ Reflex to MG    Collection Time: 09/03/22  4:02 AM   Result Value Ref Range    Sodium 139 136 - 145 mmol/L    Potassium reflex Magnesium 3.4 (L) 3.5 - 5.1 mmol/L    Chloride 108 99 - 110 mmol/L    CO2 21 21 - 32 mmol/L    Anion Gap 10 3 - 16    Glucose 102 (H) 70 - 99 mg/dL    BUN 17 7 - 20 mg/dL    Creatinine <0.5 (L) 0.6 - 1.2 mg/dL    GFR Non-African American >60 >60    GFR African American >60 >60    Calcium 7.9 (L) 8.3 - 10.6 mg/dL    Total Protein 5.0 (L) 6.4 - 8.2 g/dL    Albumin 3.1 (L) 3.4 - 5.0 g/dL    Albumin/Globulin Ratio 1.6 1.1 - 2.2    Total Bilirubin 2.1 (H) 0.0 - 1.0 mg/dL    Alkaline Phosphatase 93 40 - 129 U/L    ALT 17 10 - 40 U/L    AST 44 (H) 15 - 37 U/L   CBC with Auto Differential    Collection Time: 09/03/22  4:02 AM   Result Value Ref Range    WBC 4.4 4.0 - 11.0 K/uL    RBC 3.24 (L) 4.00 - 5.20 M/uL    Hemoglobin 10.0 (L) 12.0 - 16.0 g/dL    Hematocrit 29.7 (L) 36.0 - 48.0 %    MCV 91.8 80.0 - 100.0 fL    MCH 30.8 26.0 - 34.0 pg    MCHC 33.5 31.0 - 36.0 g/dL    RDW 16.0 (H) 12.4 - 15.4 %    Platelets 84 (L) 137 - 450 K/uL    MPV 8.6 5.0 - 10.5 fL    Neutrophils % 64.6 %    Lymphocytes % 21.3 %    Monocytes % 8.9 %    Eosinophils % 4.2 %    Basophils % 1.0 %    Neutrophils Absolute 2.9 1.7 - 7.7 K/uL    Lymphocytes Absolute 0.9 (L) 1.0 - 5.1 K/uL    Monocytes Absolute 0.4 0.0 - 1.3 K/uL    Eosinophils Absolute 0.2 0.0 - 0.6 K/uL    Basophils Absolute 0.0 0.0 - 0.2 K/uL   Basic Metabolic Panel    Collection Time: 09/03/22  4:02 AM   Result Value Ref Range    Potassium 3.4 (L) 3.5 - 5.1 mmol/L   Hepatic Function Panel    Collection Time: 09/03/22  4:02 AM   Result Value Ref Range    Bilirubin, Direct 1.1 (H) 0.0 - 0.3 mg/dL    Bilirubin, Indirect 1.0 0.0 - 1.0 mg/dL   Protime-INR    Collection Time: 09/03/22  4:02 AM   Result Value Ref Range    Protime 18.9 (H) 11.7 - 14.5 sec    INR 1.59 (H) 0.87 - 1.14   Magnesium    Collection Time: 09/03/22  4:02 AM   Result Value Ref Range    Magnesium 1.80 1.80 - 2.40 mg/dL     Other Labs    Imaging:    .  US ABDOMEN LIMITED Specify organ? LIVER   Final Result   Cirrhosis with ascites and nonspecific gallbladder wall thickening. CTA ABDOMEN PELVIS W CONTRAST   Final Result   1. No evidence of active gastrointestinal bleed. 2. Acute colitis involving the cecum through the distal descending colon. The distribution is most typical for an infectious or inflammatory etiology. An ischemic etiology could be considered in the appropriate clinical setting   but is less favored. 3. Moderate to severe atherosclerotic plaque with areas of mild stenosis in   the SMA but no major branch occlusion. 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 4. Cirrhotic configuration of the liver with small volume ascites and upper   abdominal varices. 5. Sequela of chronic pancreatitis. 6. Bony demineralization. EGD 9/1/22:  2 cm tongue of salmon-colored mucosa extending from the proximal edge of the gastric folds 34 cm from the incisors, suggestive of short segment Norman's. The distal esophagus was examined with white light and narrowband imaging, but biopsies were not performed. Scarring and nonobstructive Schatzki's rings in the distal esophagus  Pigmented spot in the proximal hiatal hernia sac with active oozing. Due to concern for Akiko Ruthy erosion, a Hemoclip was placed over top of this lesion. Following Hemoclip placement, there was concern for a gastric varix column feeding this area.   As a result, the colon was band ligated with a single band with decompression and hemostasis. Small sliding hiatal hernia  Severe portal hypertensive gastropathy  Nonbleeding duodenal erosions in the duodenal bulb    ASSESSMENT AND RECOMMENDATIONS   Ariadne Rivas is a 55-year-old female with past medical history of EtOH cirrhosis, chronic ascitic pancreatitis with pancreatic pseudocyst, DVT on Eliquis, and history of iron deficiency anemia due to presumed small bowel angiectasia's who presented [facility] [place] 9/1/2022 with melena. 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 Patient is on PPI and octreotide drip, which we will continue for 72 hours. Monitor H&H and transfuse to hemoglobin greater than 7. Continue ceftriaxone for SBP prophylaxis. Colitis: Patient with nonspecific colitis on CT. Recent colonoscopy in March 2019 without evidence of colitis. Given lack of diarrhea, suspect CT findings represent hypoalbuminemia and mucosal edema 2/2 liver disease. I would stop p.o. 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 Will need surveillance EGD in 4 weeks, and addition of Coreg at discharge. -Ascites: History of ascites 2/2 portal hypertension. Small volume ascites on CT and exam, not amenable to paracentesis. Continue ceftriaxone for SBP prophylaxis. Hold outpatient Lasix due to active GI bleed. -Immune status: Unknown  -[facility] [address] screening: We will obtain right upper quadrant ultrasound  Chronic calcific pancreatitis  Diarrhea: Etiology uncertain, will obtain stool studies to rule out infectious diarrhea.   VTE on Eliquis: Hold Eliquis    RECOMMENDATIONS:    -Low fat diet  -Stool studies  -Continue octreotide and PPI drip  -Ceftriaxone for 5 days for SBP prophylaxis    If you have any questions or need any further

## 2022-09-04 LAB
A/G RATIO: 1.1 (ref 1.1–2.2)
ALBUMIN SERPL-MCNC: 2.7 G/DL (ref 3.4–5)
ALP BLD-CCNC: 88 U/L (ref 40–129)
ALT SERPL-CCNC: 17 U/L (ref 10–40)
ANION GAP SERPL CALCULATED.3IONS-SCNC: 8 MMOL/L (ref 3–16)
AST SERPL-CCNC: 49 U/L (ref 15–37)
BASOPHILS ABSOLUTE: 0 K/UL (ref 0–0.2)
BASOPHILS RELATIVE PERCENT: 1.3 %
BILIRUB SERPL-MCNC: 1.6 MG/DL (ref 0–1)
BILIRUBIN DIRECT: 0.8 MG/DL (ref 0–0.3)
BILIRUBIN, INDIRECT: 0.8 MG/DL (ref 0–1)
BUN BLDV-MCNC: 8 MG/DL (ref 7–20)
CALCIUM SERPL-MCNC: 8.2 MG/DL (ref 8.3–10.6)
CHLORIDE BLD-SCNC: 108 MMOL/L (ref 99–110)
CO2: 23 MMOL/L (ref 21–32)
CREAT SERPL-MCNC: <0.5 MG/DL (ref 0.6–1.2)
CRYPTOSPORIDIUM ANTIGEN STOOL: NORMAL
E HISTOLYTICA ANTIGEN STOOL: NORMAL
EOSINOPHILS ABSOLUTE: 0.1 K/UL (ref 0–0.6)
EOSINOPHILS RELATIVE PERCENT: 3.2 %
GFR AFRICAN AMERICAN: >60
GFR NON-AFRICAN AMERICAN: >60
GI BACTERIAL PATHOGENS BY PCR: NORMAL
GIARDIA ANTIGEN STOOL: NORMAL
GLUCOSE BLD-MCNC: 96 MG/DL (ref 70–99)
HCT VFR BLD CALC: 30.1 % (ref 36–48)
HEMOGLOBIN: 10.3 G/DL (ref 12–16)
INR BLD: 1.78 (ref 0.87–1.14)
LYMPHOCYTES ABSOLUTE: 0.7 K/UL (ref 1–5.1)
LYMPHOCYTES RELATIVE PERCENT: 19.5 %
MCH RBC QN AUTO: 31.2 PG (ref 26–34)
MCHC RBC AUTO-ENTMCNC: 34.1 G/DL (ref 31–36)
MCV RBC AUTO: 91.6 FL (ref 80–100)
MONOCYTES ABSOLUTE: 0.2 K/UL (ref 0–1.3)
MONOCYTES RELATIVE PERCENT: 6.8 %
NEUTROPHILS ABSOLUTE: 2.5 K/UL (ref 1.7–7.7)
NEUTROPHILS RELATIVE PERCENT: 69.2 %
PDW BLD-RTO: 15.9 % (ref 12.4–15.4)
PLATELET # BLD: 78 K/UL (ref 135–450)
PMV BLD AUTO: 8.5 FL (ref 5–10.5)
POTASSIUM REFLEX MAGNESIUM: 3.8 MMOL/L (ref 3.5–5.1)
POTASSIUM SERPL-SCNC: 3.8 MMOL/L (ref 3.5–5.1)
PROTHROMBIN TIME: 20.7 SEC (ref 11.7–14.5)
RBC # BLD: 3.28 M/UL (ref 4–5.2)
SODIUM BLD-SCNC: 139 MMOL/L (ref 136–145)
TOTAL PROTEIN: 5.1 G/DL (ref 6.4–8.2)
WBC # BLD: 3.6 K/UL (ref 4–11)

## 2022-09-04 PROCEDURE — 2580000003 HC RX 258: Performed by: PHYSICIAN ASSISTANT

## 2022-09-04 PROCEDURE — 6370000000 HC RX 637 (ALT 250 FOR IP): Performed by: HOSPITALIST

## 2022-09-04 PROCEDURE — 36415 COLL VENOUS BLD VENIPUNCTURE: CPT

## 2022-09-04 PROCEDURE — 85610 PROTHROMBIN TIME: CPT

## 2022-09-04 PROCEDURE — 94760 N-INVAS EAR/PLS OXIMETRY 1: CPT

## 2022-09-04 PROCEDURE — 85025 COMPLETE CBC W/AUTO DIFF WBC: CPT

## 2022-09-04 PROCEDURE — 2060000000 HC ICU INTERMEDIATE R&B

## 2022-09-04 PROCEDURE — 6360000002 HC RX W HCPCS: Performed by: PHYSICIAN ASSISTANT

## 2022-09-04 PROCEDURE — 6370000000 HC RX 637 (ALT 250 FOR IP): Performed by: STUDENT IN AN ORGANIZED HEALTH CARE EDUCATION/TRAINING PROGRAM

## 2022-09-04 PROCEDURE — 80053 COMPREHEN METABOLIC PANEL: CPT

## 2022-09-04 RX ORDER — SIMETHICONE 80 MG
80 TABLET,CHEWABLE ORAL EVERY 6 HOURS PRN
Status: DISCONTINUED | OUTPATIENT
Start: 2022-09-04 | End: 2022-09-06 | Stop reason: HOSPADM

## 2022-09-04 RX ADMIN — ACETAMINOPHEN 650 MG: 325 TABLET, FILM COATED ORAL at 10:41

## 2022-09-04 RX ADMIN — ACETAMINOPHEN 650 MG: 325 TABLET, FILM COATED ORAL at 15:29

## 2022-09-04 RX ADMIN — CEFTRIAXONE 1000 MG: 1 INJECTION, POWDER, FOR SOLUTION INTRAMUSCULAR; INTRAVENOUS at 08:36

## 2022-09-04 RX ADMIN — ZOLPIDEM TARTRATE 5 MG: 5 TABLET ORAL at 20:23

## 2022-09-04 RX ADMIN — ACETAMINOPHEN 650 MG: 325 TABLET, FILM COATED ORAL at 06:49

## 2022-09-04 RX ADMIN — PANTOPRAZOLE SODIUM 40 MG: 40 TABLET, DELAYED RELEASE ORAL at 15:26

## 2022-09-04 RX ADMIN — PANTOPRAZOLE SODIUM 40 MG: 40 TABLET, DELAYED RELEASE ORAL at 04:52

## 2022-09-04 ASSESSMENT — PAIN DESCRIPTION - LOCATION
LOCATION: HEAD
LOCATION: BACK
LOCATION: BACK;HEAD
LOCATION: BACK;HEAD

## 2022-09-04 ASSESSMENT — PAIN SCALES - GENERAL
PAINLEVEL_OUTOF10: 3
PAINLEVEL_OUTOF10: 0
PAINLEVEL_OUTOF10: 3

## 2022-09-04 ASSESSMENT — PAIN DESCRIPTION - DESCRIPTORS
DESCRIPTORS: ACHING
DESCRIPTORS: ACHING;DISCOMFORT
DESCRIPTORS: ACHING;DISCOMFORT

## 2022-09-04 ASSESSMENT — PAIN - FUNCTIONAL ASSESSMENT: PAIN_FUNCTIONAL_ASSESSMENT: PREVENTS OR INTERFERES SOME ACTIVE ACTIVITIES AND ADLS

## 2022-09-04 NOTE — PROGRESS NOTES
Hospitalist Progress Note      PCP: Marvin Almendarez RN, NP    Date of Admission: 9/1/2022    Chief Complaint: Melena. Hospital Course:    79 y.o. female who presents to [facility] with melena. Patient with a past medical history of PE/DVT on Eliquis, liver cirrhosis (likely secondary to alcohol), patient presented with melena. Admitted with multiple episodes of melena, EGD 9/1, pigmented spot and hiatal hernia with active oozing, Hemoclip placed, ligated. Subjective:   Denies any new complaints, slept well last night. Reports feeling gasy. Medications:  Reviewed    Infusion Medications    sodium chloride      sodium chloride      octreotide (SandoSTATIN) infusion 25 mcg/hr (09/04/22 3436)     Scheduled Medications    pantoprazole  40 mg Oral BID AC    sodium chloride flush  5-40 mL IntraVENous 2 times per day    cefTRIAXone (ROCEPHIN) IV  1,000 mg IntraVENous Q24H     PRN Meds: zolpidem, potassium chloride, sodium chloride flush, sodium chloride, ondansetron **OR** ondansetron, acetaminophen **OR** acetaminophen, sodium chloride, melatonin, diphenhydrAMINE      Intake/Output Summary (Last 24 hours) at 9/4/2022 1143  Last data filed at 9/4/2022 0932  Gross per 24 hour   Intake 1071.99 ml   Output 200 ml   Net 871.99 ml         Physical Exam Performed:    BP (!) 142/98   Pulse 74   Temp 97.5 °F (36.4 °C) (Oral)   Resp 18   Ht 5' 3\" (1.6 m)   Wt 95 lb 7.4 oz (43.3 kg)   SpO2 94%   BMI 16.91 kg/m²     General appearance: No apparent distress, appears stated age and cooperative. HEENT: Pupils equal, round, and reactive to light. Conjunctivae/corneas clear. Neck: Supple, with full range of motion. No jugular venous distention. Trachea midline. Respiratory:  Normal respiratory effort. Clear to auscultation, bilaterally without Rales/Wheezes/Rhonchi. Cardiovascular: Regular rate and rhythm with normal S1/S2 without murmurs, rubs or gallops.   Abdomen: non tender abdomen, resonant Musculoskeletal: No clubbing, cyanosis or edema bilaterally. Full range of motion without deformity. Skin: Skin color, texture, turgor normal.  No rashes or lesions. Neurologic:  Neurovascularly intact without any focal sensory/motor deficits. Cranial nerves: II-XII intact, grossly non-focal.  Psychiatric: Alert and oriented, thought content appropriate, normal insight  Capillary Refill: Brisk, 3 seconds, normal   Peripheral Pulses: +2 palpable, equal bilaterally       Labs:   Recent Labs     09/02/22  0327 09/02/22  1628 09/03/22  0402 09/03/22  1536 09/04/22  0716   WBC 3.2*  --  4.4  --  3.6*   HGB 9.4*   < > 10.0*  9.8* 10.6* 10.3*   HCT 27.9*   < > 29.7*  29.2* 31.6* 30.1*   PLT 83*  --  84*  --  78*    < > = values in this interval not displayed. Recent Labs     09/02/22 0327 09/03/22  0402 09/04/22  0716    139 139   K 3.1*  3.1* 3.4*  3.4* 3.8  3.8   * 108 108   CO2 22 21 23   BUN 26* 17 8   CREATININE 0.6 <0.5* <0.5*   CALCIUM 8.1* 7.9* 8.2*       Recent Labs     09/02/22 0327 09/03/22  0402 09/04/22  0716   AST 49* 44* 49*   ALT 16 17 17   BILIDIR 1.5* 1.1* 0.8*   BILITOT 3.3* 2.1* 1.6*   ALKPHOS 98 93 88       Recent Labs     09/02/22 0327 09/03/22  0402 09/04/22  0716   INR 1.60* 1.59* 1.78*       No results for input(s): CKTOTAL, TROPONINI in the last 72 hours. Urinalysis:      Lab Results   Component Value Date/Time    NITRU Negative 03/09/2019 04:00 PM    WBCUA 5 03/09/2019 04:00 PM    BACTERIA Rare 03/09/2019 04:00 PM    RBCUA 3-5 03/09/2019 04:00 PM    BLOODU Negative 03/09/2019 04:00 PM    SPECGRAV 1.026 03/09/2019 04:00 PM    GLUCOSEU Negative 03/09/2019 04:00 PM       Radiology:  [address] organ? LIVER   Final Result   Cirrhosis with ascites and nonspecific gallbladder wall thickening. CTA ABDOMEN PELVIS W CONTRAST   Final Result   1. No evidence of active gastrointestinal bleed.    2. Acute colitis involving the cecum through the distal descending colon. The distribution is most typical for an infectious or inflammatory etiology. An ischemic etiology could be considered in the appropriate clinical setting   but is less favored. 3. Moderate to severe atherosclerotic plaque with areas of mild stenosis in   the SMA but no major branch occlusion. 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 4. Cirrhotic configuration of the liver with small volume ascites and upper   abdominal varices. 5. Sequela of chronic pancreatitis. 6. Bony demineralization. Assessment/Plan:    Active Hospital Problems    Diagnosis     Pancolitis ([facility] [K51.00]      Priority: Medium    Severe malnutrition ([facility] [E43]      Melena  GI bleeding  Patient presented to emergency with more than 20 episodes of melena, due to history of cirrhosis concern for possible variceal bleed. EGD 9/1, pigmented spot and hiatal hernia with active oozing, Hemoclip placed, ligated. Plan. -Appreciate GI input  -Progress diet to regular (low-fat)   -H&H stable. -Continue octreotide infusion  -Patient declined reinsertion of a second IV cannula, will switch pantoprazole infusion to oral  -Add simethicone     Acute blood loss anemia  Transfuse 1 unit  Continue to follow hb.   10--> 9.4--> 9.8--> 10.3  Check H&H daily     Hypokalemia. .   Replace. Liver cirrhosis  No ascites the time being, continue ceftriaxone for SBP prophylaxis. Colitis  Nonspecific colitis noted on CT scan  Appreciate GI input     History of DVT  Hold Eliquis for now    DVT Prophylaxis: SCDs  Diet: ADULT DIET; Regular; Low Fat/Low Chol/High Fiber/2 gm Na  Code Status: Full Code  PT/OT Eval Status: baseline.      Dispo - pending clinical improvement      Nano Armstrong MD

## 2022-09-05 LAB
ALBUMIN SERPL-MCNC: 2.6 G/DL (ref 3.4–5)
ALP BLD-CCNC: 93 U/L (ref 40–129)
ALT SERPL-CCNC: 17 U/L (ref 10–40)
ANION GAP SERPL CALCULATED.3IONS-SCNC: 8 MMOL/L (ref 3–16)
AST SERPL-CCNC: 42 U/L (ref 15–37)
BASOPHILS ABSOLUTE: 0 K/UL (ref 0–0.2)
BASOPHILS RELATIVE PERCENT: 0.9 %
BILIRUB SERPL-MCNC: 1.3 MG/DL (ref 0–1)
BILIRUBIN DIRECT: 0.7 MG/DL (ref 0–0.3)
BILIRUBIN, INDIRECT: 0.6 MG/DL (ref 0–1)
BUN BLDV-MCNC: 8 MG/DL (ref 7–20)
CALCIUM SERPL-MCNC: 7.8 MG/DL (ref 8.3–10.6)
CHLORIDE BLD-SCNC: 109 MMOL/L (ref 99–110)
CO2: 24 MMOL/L (ref 21–32)
CREAT SERPL-MCNC: 0.5 MG/DL (ref 0.6–1.2)
EOSINOPHILS ABSOLUTE: 0.1 K/UL (ref 0–0.6)
EOSINOPHILS RELATIVE PERCENT: 4.2 %
GFR AFRICAN AMERICAN: >60
GFR NON-AFRICAN AMERICAN: >60
GLUCOSE BLD-MCNC: 97 MG/DL (ref 70–99)
HCT VFR BLD CALC: 29.3 % (ref 36–48)
HEMOGLOBIN: 10.1 G/DL (ref 12–16)
INR BLD: 1.78 (ref 0.87–1.14)
LYMPHOCYTES ABSOLUTE: 0.7 K/UL (ref 1–5.1)
LYMPHOCYTES RELATIVE PERCENT: 21.6 %
MCH RBC QN AUTO: 31.4 PG (ref 26–34)
MCHC RBC AUTO-ENTMCNC: 34.5 G/DL (ref 31–36)
MCV RBC AUTO: 90.9 FL (ref 80–100)
MONOCYTES ABSOLUTE: 0.3 K/UL (ref 0–1.3)
MONOCYTES RELATIVE PERCENT: 9.4 %
NEUTROPHILS ABSOLUTE: 2.2 K/UL (ref 1.7–7.7)
NEUTROPHILS RELATIVE PERCENT: 63.9 %
PDW BLD-RTO: 16.2 % (ref 12.4–15.4)
PLATELET # BLD: 78 K/UL (ref 135–450)
PMV BLD AUTO: 8.8 FL (ref 5–10.5)
POTASSIUM SERPL-SCNC: 3.7 MMOL/L (ref 3.5–5.1)
PROTHROMBIN TIME: 20.7 SEC (ref 11.7–14.5)
RBC # BLD: 3.22 M/UL (ref 4–5.2)
SODIUM BLD-SCNC: 141 MMOL/L (ref 136–145)
TOTAL PROTEIN: 4.9 G/DL (ref 6.4–8.2)
WBC # BLD: 3.5 K/UL (ref 4–11)

## 2022-09-05 PROCEDURE — 36415 COLL VENOUS BLD VENIPUNCTURE: CPT

## 2022-09-05 PROCEDURE — 6370000000 HC RX 637 (ALT 250 FOR IP): Performed by: INTERNAL MEDICINE

## 2022-09-05 PROCEDURE — 2060000000 HC ICU INTERMEDIATE R&B

## 2022-09-05 PROCEDURE — 6360000002 HC RX W HCPCS: Performed by: PHYSICIAN ASSISTANT

## 2022-09-05 PROCEDURE — 85610 PROTHROMBIN TIME: CPT

## 2022-09-05 PROCEDURE — 80076 HEPATIC FUNCTION PANEL: CPT

## 2022-09-05 PROCEDURE — 2580000003 HC RX 258: Performed by: HOSPITALIST

## 2022-09-05 PROCEDURE — 6370000000 HC RX 637 (ALT 250 FOR IP): Performed by: HOSPITALIST

## 2022-09-05 PROCEDURE — 94760 N-INVAS EAR/PLS OXIMETRY 1: CPT

## 2022-09-05 PROCEDURE — 6370000000 HC RX 637 (ALT 250 FOR IP): Performed by: STUDENT IN AN ORGANIZED HEALTH CARE EDUCATION/TRAINING PROGRAM

## 2022-09-05 PROCEDURE — 80048 BASIC METABOLIC PNL TOTAL CA: CPT

## 2022-09-05 PROCEDURE — 85025 COMPLETE CBC W/AUTO DIFF WBC: CPT

## 2022-09-05 PROCEDURE — 2580000003 HC RX 258: Performed by: PHYSICIAN ASSISTANT

## 2022-09-05 RX ORDER — FUROSEMIDE 40 MG/1
40 TABLET ORAL 2 TIMES DAILY
Status: DISCONTINUED | OUTPATIENT
Start: 2022-09-05 | End: 2022-09-06 | Stop reason: HOSPADM

## 2022-09-05 RX ORDER — SPIRONOLACTONE 25 MG/1
50 TABLET ORAL DAILY
Status: DISCONTINUED | OUTPATIENT
Start: 2022-09-05 | End: 2022-09-06 | Stop reason: HOSPADM

## 2022-09-05 RX ADMIN — SPIRONOLACTONE 50 MG: 25 TABLET ORAL at 08:36

## 2022-09-05 RX ADMIN — ACETAMINOPHEN 650 MG: 325 TABLET, FILM COATED ORAL at 15:19

## 2022-09-05 RX ADMIN — PANTOPRAZOLE SODIUM 40 MG: 40 TABLET, DELAYED RELEASE ORAL at 05:21

## 2022-09-05 RX ADMIN — ZOLPIDEM TARTRATE 5 MG: 5 TABLET ORAL at 20:17

## 2022-09-05 RX ADMIN — ACETAMINOPHEN 650 MG: 325 TABLET, FILM COATED ORAL at 05:21

## 2022-09-05 RX ADMIN — CEFTRIAXONE 1000 MG: 1 INJECTION, POWDER, FOR SOLUTION INTRAMUSCULAR; INTRAVENOUS at 08:39

## 2022-09-05 RX ADMIN — FUROSEMIDE 40 MG: 40 TABLET ORAL at 16:50

## 2022-09-05 RX ADMIN — FUROSEMIDE 40 MG: 40 TABLET ORAL at 08:35

## 2022-09-05 RX ADMIN — PANTOPRAZOLE SODIUM 40 MG: 40 TABLET, DELAYED RELEASE ORAL at 16:50

## 2022-09-05 RX ADMIN — SODIUM CHLORIDE, PRESERVATIVE FREE 10 ML: 5 INJECTION INTRAVENOUS at 08:41

## 2022-09-05 ASSESSMENT — PAIN DESCRIPTION - LOCATION
LOCATION: BACK;HEAD
LOCATION: BACK;HEAD

## 2022-09-05 ASSESSMENT — PAIN SCALES - GENERAL
PAINLEVEL_OUTOF10: 9
PAINLEVEL_OUTOF10: 4

## 2022-09-05 ASSESSMENT — PAIN - FUNCTIONAL ASSESSMENT: PAIN_FUNCTIONAL_ASSESSMENT: ACTIVITIES ARE NOT PREVENTED

## 2022-09-05 ASSESSMENT — PAIN DESCRIPTION - DESCRIPTORS: DESCRIPTORS: THROBBING

## 2022-09-05 ASSESSMENT — PAIN DESCRIPTION - ORIENTATION: ORIENTATION: OTHER (COMMENT)

## 2022-09-05 NOTE — PROGRESS NOTES
INPATIENT CONSULTATION:    IDENTIFYING DATA/REASON FOR CONSULTATION   PATIENT:  Binta Prcie  MRN:  [id number]  ADMIT DATE: 9/1/2022  TIME OF EVALUATION: 9/4/2022 8:50 PM  HOSPITAL STAY:   LOS: 3 days   CONSULTING PHYSICIAN: Loraine Jones MD   REASON FOR CONSULTATION: Upper GI bleed    Subjective:    Patient seen and examined in follow-up. Patient reports she is doing well. She had two loose non-melenic bowel movements yesterday. She tolerated a regular diet. She does note abdominal distension. MEDICATIONS   SCHEDULED:  pantoprazole, 40 mg, BID AC  sodium chloride flush, 5-40 mL, 2 times per day  cefTRIAXone (ROCEPHIN) IV, 1,000 mg, Q24H    FLUIDS/DRIPS:     sodium chloride      sodium chloride      octreotide (SandoSTATIN) infusion 25 mcg/hr (09/04/22 0736)     PRNs: simethicone, 80 mg, Q6H PRN  zolpidem, 5 mg, Nightly PRN  potassium chloride, 10 mEq, PRN  sodium chloride flush, 5-40 mL, PRN  sodium chloride, , PRN  ondansetron, 4 mg, Q8H PRN   Or  ondansetron, 4 mg, Q6H PRN  acetaminophen, 650 mg, Q6H PRN   Or  acetaminophen, 650 mg, Q6H PRN  sodium chloride, , PRN  melatonin, 6 mg, Nightly PRN  diphenhydrAMINE, 25 mg, Nightly PRN    ALLERGIES:  No Known Allergies      PHYSICAL EXAM     Vitals:    09/04/22 1041 09/04/22 1241 09/04/22 1530 09/04/22 2014   BP: (!) 142/98  (!) 138/90 128/80   Pulse: 74 74 82 74   Resp: 18 18 18 21   Temp: 97.5 °F (36.4 °C)  98 °F (36.7 °C) 98.4 °F (36.9 °C)   TempSrc: Oral  Oral Oral   SpO2: 94% 94% 97% 96%   Weight:       Height:           I/O last 3 completed shifts: In: 2211.4 [P.O.:1440; I.V.:300.1;  IV Piggyback:471.3]  Out: 200 [Urine:200]    Physical Exam:  Gen: Resting in bed, NAD  HEENT: Normocephalic, atraumatic, no scleral icterus   CV: RRR no MRG   Pul: CTAB, normal work of breathing without wheezing  Abd: Good bowel sounds throughout, soft but distended and hypotympanic suggestive of moderate/large ascites, NT, no masses, no HSM   Ext: No edema, moves all 4 0.0 - 1.0 mg/dL   Protime-INR    Collection Time: 09/04/22  7:16 AM   Result Value Ref Range    Protime 20.7 (H) 11.7 - 14.5 sec    INR 1.78 (H) 0.87 - 1.14     Other Labs    Imaging:    .  US ABDOMEN LIMITED Specify organ? LIVER   Final Result   Cirrhosis with ascites and nonspecific gallbladder wall thickening. CTA ABDOMEN PELVIS W CONTRAST   Final Result   1. No evidence of active gastrointestinal bleed. 2. Acute colitis involving the cecum through the distal descending colon. The distribution is most typical for an infectious or inflammatory etiology. An ischemic etiology could be considered in the appropriate clinical setting   but is less favored. 3. Moderate to severe atherosclerotic plaque with areas of mild stenosis in   the SMA but no major branch occlusion. 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 4. Cirrhotic configuration of the liver with small volume ascites and upper   abdominal varices. 5. Sequela of chronic pancreatitis. 6. Bony demineralization. EGD 9/1/22:  2 cm tongue of salmon-colored mucosa extending from the proximal edge of the gastric folds 34 cm from the incisors, suggestive of short segment Norman's. The distal esophagus was examined with white light and narrowband imaging, but biopsies were not performed. Scarring and nonobstructive Schatzki's rings in the distal esophagus  Pigmented spot in the proximal hiatal hernia sac with active oozing. Due to concern for [facility] erosion, a Hemoclip was placed over top of this lesion. Following Hemoclip placement, there was concern for a gastric varix column feeding this area. As a result, the colon was band ligated with a single band with decompression and hemostasis.   Small sliding hiatal hernia  Severe portal hypertensive gastropathy  Nonbleeding duodenal erosions in the duodenal bulb    ASSESSMENT AND RECOMMENDATIONS   Gorge Romberg is a 70-year-old female with past medical history of EtOH cirrhosis, chronic ascitic pancreatitis with pancreatic pseudocyst, DVT on Eliquis, and history of iron deficiency anemia due to presumed small bowel angiectasia's who presented [facility] [place] 9/1/2022 with melena. 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 Patient is on PPI and octreotide drip, which we will continue for 72 hours. Monitor H&H and transfuse to hemoglobin greater than 7. Continue ceftriaxone for SBP prophylaxis. Colitis: Patient with nonspecific colitis on CT. Recent colonoscopy in March 2019 without evidence of colitis. Suspect CT findings represent hypoalbuminemia and mucosal edema 2/2 liver disease. 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 Will need surveillance EGD in 4 weeks, and addition of Coreg at discharge. -Ascites: History of ascites 2/2 portal hypertension. Small volume ascites on CT on admission, but large ascites today on exam.  Continue ceftriaxone for SBP prophylaxis. Hold outpatient Lasix due to active GI bleed. -Immune status: Unknown  [facility]: We will obtain right upper quadrant ultrasound  Chronic calcific pancreatitis  Diarrhea: Resolved, infectious stool studies negative. VTE on Eliquis: Hold Eliquis    RECOMMENDATIONS:    -Low fat diet  -Ok to restart diuretics  -IR consult for paracentesis  -Continue octreotide and PPI drip  -Ceftriaxone for 5 days for SBP prophylaxis    If you have any questions or need any further information, please feel free to contact anyone on our consult team.  Thank you for allowing us to participate in the care of Julienne Walter. Mile Monroy.  [address] [facility]

## 2022-09-05 NOTE — PROGRESS NOTES
INPATIENT CONSULTATION:    IDENTIFYING DATA/REASON FOR CONSULTATION   PATIENT:  Gorge Romberg  MRN:  [id number]  ADMIT DATE: 9/1/2022  TIME OF EVALUATION: 9/5/2022 8:00 AM  HOSPITAL STAY:   LOS: 4 days   CONSULTING PHYSICIAN: Collins Moses MD   REASON FOR CONSULTATION: Upper GI bleed    Subjective:    Patient seen and examined in follow-up. Patient reports she is doing well. She had three loose non-melenic bowel movements yesterday. She tolerated a regular diet. She does note abdominal distension. MEDICATIONS   SCHEDULED:  pantoprazole, 40 mg, BID AC  sodium chloride flush, 5-40 mL, 2 times per day  cefTRIAXone (ROCEPHIN) IV, 1,000 mg, Q24H    FLUIDS/DRIPS:     sodium chloride      sodium chloride      octreotide (SandoSTATIN) infusion 25 mcg/hr (09/05/22 0523)     PRNs: simethicone, 80 mg, Q6H PRN  zolpidem, 5 mg, Nightly PRN  potassium chloride, 10 mEq, PRN  sodium chloride flush, 5-40 mL, PRN  sodium chloride, , PRN  ondansetron, 4 mg, Q8H PRN   Or  ondansetron, 4 mg, Q6H PRN  acetaminophen, 650 mg, Q6H PRN   Or  acetaminophen, 650 mg, Q6H PRN  sodium chloride, , PRN  melatonin, 6 mg, Nightly PRN  diphenhydrAMINE, 25 mg, Nightly PRN    ALLERGIES:  No Known Allergies      PHYSICAL EXAM     Vitals:    09/04/22 2014 09/05/22 0437 09/05/22 0459 09/05/22 0759   BP: 128/80 (!) 148/94  (!) 143/90   Pulse: 74 73     Resp: 21 18 18   Temp: 98.4 °F (36.9 °C) 98.1 °F (36.7 °C)  98.2 °F (36.8 °C)   TempSrc: Oral Oral  Oral   SpO2: 96% 91%     Weight:   97 lb (44 kg)    Height:           I/O last 3 completed shifts:   In: 1317.6 [P.O.:1080; I.V.:187.6; IV Piggyback:50]  Out: 450 [Urine:450]    Physical Exam:  Gen: Resting in bed, NAD  HEENT: Normocephalic, atraumatic, no scleral icterus   CV: RRR no MRG   Pul: CTAB, normal work of breathing without wheezing  Abd: Good bowel sounds throughout, soft but distended and hypotympanic suggestive of moderate/large ascites, NT, no masses, no HSM   Ext: No edema, moves all 4 extremities. Neuro: Moves all four extremities, no gross deficits, follows commands.   No asterixis  Skin: No jaundice, spider angiomas, palmar erythema    LABS AND IMAGING     Recent Results (from the past 24 hour(s))   CBC with Auto Differential    Collection Time: 09/05/22  4:44 AM   Result Value Ref Range    WBC 3.5 (L) 4.0 - 11.0 K/uL    RBC 3.22 (L) 4.00 - 5.20 M/uL    Hemoglobin 10.1 (L) 12.0 - 16.0 g/dL    Hematocrit 29.3 (L) 36.0 - 48.0 %    MCV 90.9 80.0 - 100.0 fL    MCH 31.4 26.0 - 34.0 pg    MCHC 34.5 31.0 - 36.0 g/dL    RDW 16.2 (H) 12.4 - 15.4 %    Platelets 78 (L) 858 - 450 K/uL    MPV 8.8 5.0 - 10.5 fL    Neutrophils % 63.9 %    Lymphocytes % 21.6 %    Monocytes % 9.4 %    Eosinophils % 4.2 %    Basophils % 0.9 %    Neutrophils Absolute 2.2 1.7 - 7.7 K/uL    Lymphocytes Absolute 0.7 (L) 1.0 - 5.1 K/uL    Monocytes Absolute 0.3 0.0 - 1.3 K/uL    Eosinophils Absolute 0.1 0.0 - 0.6 K/uL    Basophils Absolute 0.0 0.0 - 0.2 K/uL   Basic Metabolic Panel    Collection Time: 09/05/22  4:44 AM   Result Value Ref Range    Sodium 141 136 - 145 mmol/L    Potassium 3.7 3.5 - 5.1 mmol/L    Chloride 109 99 - 110 mmol/L    CO2 24 21 - 32 mmol/L    Anion Gap 8 3 - 16    Glucose 97 70 - 99 mg/dL    BUN 8 7 - 20 mg/dL    Creatinine 0.5 (L) 0.6 - 1.2 mg/dL    GFR Non-African American >60 >60    GFR African American >60 >60    Calcium 7.8 (L) 8.3 - 10.6 mg/dL   Hepatic Function Panel    Collection Time: 09/05/22  4:44 AM   Result Value Ref Range    Total Protein 4.9 (L) 6.4 - 8.2 g/dL    Albumin 2.6 (L) 3.4 - 5.0 g/dL    Alkaline Phosphatase 93 40 - 129 U/L    ALT 17 10 - 40 U/L    AST 42 (H) 15 - 37 U/L    Total Bilirubin 1.3 (H) 0.0 - 1.0 mg/dL    Bilirubin, Direct 0.7 (H) 0.0 - 0.3 mg/dL    Bilirubin, Indirect 0.6 0.0 - 1.0 mg/dL   Protime-INR    Collection Time: 09/05/22  4:44 AM   Result Value Ref Range    Protime 20.7 (H) 11.7 - 14.5 sec    INR 1.78 (H) 0.87 - 1.14     Other Labs    Imaging:    .  US ABDOMEN LIMITED Specify organ? LIVER   Final Result   Cirrhosis with ascites and nonspecific gallbladder wall thickening. CTA ABDOMEN PELVIS W CONTRAST   Final Result   1. No evidence of active gastrointestinal bleed. 2. Acute colitis involving the cecum through the distal descending colon. The distribution is most typical for an infectious or inflammatory etiology. An ischemic etiology could be considered in the appropriate clinical setting   but is less favored. 3. Moderate to severe atherosclerotic plaque with areas of mild stenosis in   the SMA but no major branch occlusion. 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 4. Cirrhotic configuration of the liver with small volume ascites and upper   abdominal varices. 5. Sequela of chronic pancreatitis. 6. Bony demineralization. EGD 9/1/22:  2 cm tongue of salmon-colored mucosa extending from the proximal edge of the gastric folds 34 cm from the incisors, suggestive of short segment Norman's. The distal esophagus was examined with white light and narrowband imaging, but biopsies were not performed. Scarring and nonobstructive Schatzki's rings in the distal esophagus  Pigmented spot in the proximal hiatal hernia sac with active oozing. Due to concern for [facility] erosion, a Hemoclip was placed over top of this lesion. Following Hemoclip placement, there was concern for a gastric varix column feeding this area. As a result, the colon was band ligated with a single band with decompression and hemostasis.   Small sliding hiatal hernia  Severe portal hypertensive gastropathy  Nonbleeding duodenal erosions in the duodenal bulb    ASSESSMENT AND RECOMMENDATIONS   Gorge Romberg is a 44-year-old female with past medical history of EtOH cirrhosis, chronic calcific pancreatitis with pancreatic pseudocyst, DVT on Eliquis, and history of iron deficiency anemia due to presumed small bowel angiectasia's who presented [facility] [place] 9/1/2022 with melena. 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 Patient is on PPI and completed octreotide drip. Monitor H&H and transfuse to hemoglobin greater than 7. Continue ceftriaxone for SBP prophylaxis. Colitis: Patient with nonspecific colitis on CT. Recent colonoscopy in March 2019 without evidence of colitis. Suspect CT findings represent hypoalbuminemia and mucosal edema 2/2 liver disease. 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 Will need surveillance EGD in 4 weeks, and addition of Coreg at discharge. -Ascites: History of ascites 2/2 portal hypertension. Small volume ascites on CT on admission, but large ascites today on exam.  Continue ceftriaxone for SBP prophylaxis. Hold outpatient Lasix due to active GI bleed. -Immune status: Unknown  [facility]: We will obtain right upper quadrant ultrasound  Chronic calcific pancreatitis  Diarrhea: Resolved, infectious stool studies negative. VTE on Eliquis: Hold Eliquis    RECOMMENDATIONS:    -Low fat diet  -Will restart furosemide 40 mg bid (home diuretic) and add spironolactone 50 mg qd  -IR consult for paracentesis  -Ceftriaxone for 5 days for SBP prophylaxis    If you have any questions or need any further information, please feel free to contact anyone on our consult team.  Thank you for allowing us to participate in the care of Shelby Muhammad. Clint Lechuga.  [address]

## 2022-09-05 NOTE — PROGRESS NOTES
Hospitalist Progress Note      PCP: Shell Wild RN, NP    Date of Admission: 9/1/2022    Chief Complaint: Melena. Hospital Course:    79 y.o. female who presents to [facility] with melena. Patient with a past medical history of PE/DVT on Eliquis, liver cirrhosis (likely secondary to alcohol), patient presented with melena. Admitted with multiple episodes of melena, EGD 9/1, pigmented spot and hiatal hernia with active oozing, Hemoclip placed, ligated. Subjective:   Continues to report abdominal distention. Medications:  Reviewed    Infusion Medications    sodium chloride      sodium chloride       Scheduled Medications    furosemide  40 mg Oral BID    spironolactone  50 mg Oral Daily    pantoprazole  40 mg Oral BID AC    sodium chloride flush  5-40 mL IntraVENous 2 times per day     PRN Meds: simethicone, zolpidem, potassium chloride, sodium chloride flush, sodium chloride, ondansetron **OR** ondansetron, acetaminophen **OR** acetaminophen, sodium chloride, melatonin, diphenhydrAMINE      Intake/Output Summary (Last 24 hours) at 9/5/2022 1329  Last data filed at 9/5/2022 0951  Gross per 24 hour   Intake 1358.91 ml   Output 250 ml   Net 1108.91 ml         Physical Exam Performed:    /69   Pulse 66   Temp 97.7 °F (36.5 °C) (Oral)   Resp 18   Ht 5' 3\" (1.6 m)   Wt 97 lb (44 kg)   SpO2 95%   BMI 17.18 kg/m²     General appearance: No apparent distress, appears stated age and cooperative. HEENT: Pupils equal, round, and reactive to light. Conjunctivae/corneas clear. Neck: Supple, with full range of motion. No jugular venous distention. Trachea midline. Respiratory:  Normal respiratory effort. Clear to auscultation, bilaterally without Rales/Wheezes/Rhonchi. Cardiovascular: Regular rate and rhythm with normal S1/S2 without murmurs, rubs or gallops. Abdomen: abdominal distension noted. Musculoskeletal: No clubbing, cyanosis or edema bilaterally.   Full range of motion without deformity. Skin: Skin color, texture, turgor normal.  No rashes or lesions. Neurologic:  Neurovascularly intact without any focal sensory/motor deficits. Cranial nerves: II-XII intact, grossly non-focal.  Psychiatric: Alert and oriented, thought content appropriate, normal insight  Capillary Refill: Brisk, 3 seconds, normal   Peripheral Pulses: +2 palpable, equal bilaterally       Labs:   Recent Labs     09/03/22  0402 09/03/22  1536 09/04/22  0716 09/05/22  0444   WBC 4.4  --  3.6* 3.5*   HGB 10.0*  9.8* 10.6* 10.3* 10.1*   HCT 29.7*  29.2* 31.6* 30.1* 29.3*   PLT 84*  --  78* 78*       Recent Labs     09/03/22  0402 09/04/22  0716 09/05/22  0444    139 141   K 3.4*  3.4* 3.8  3.8 3.7    108 109   CO2 21 23 24   BUN 17 8 8   CREATININE <0.5* <0.5* 0.5*   CALCIUM 7.9* 8.2* 7.8*       Recent Labs     09/03/22  0402 09/04/22  0716 09/05/22  0444   AST 44* 49* 42*   ALT 17 17 17   BILIDIR 1.1* 0.8* 0.7*   BILITOT 2.1* 1.6* 1.3*   ALKPHOS 93 88 93       Recent Labs     09/03/22  0402 09/04/22  0716 09/05/22  0444   INR 1.59* 1.78* 1.78*       No results for input(s): CKTOTAL, TROPONINI in the last 72 hours. Urinalysis:      Lab Results   Component Value Date/Time    NITRU Negative 03/09/2019 04:00 PM    WBCUA 5 03/09/2019 04:00 PM    BACTERIA Rare 03/09/2019 04:00 PM    RBCUA 3-5 03/09/2019 04:00 PM    BLOODU Negative 03/09/2019 04:00 PM    SPECGRAV 1.026 03/09/2019 04:00 PM    GLUCOSEU Negative 03/09/2019 04:00 PM       Radiology:  [id number] organ? LIVER   Final Result   Cirrhosis with ascites and nonspecific gallbladder wall thickening. CTA ABDOMEN PELVIS W CONTRAST   Final Result   1. No evidence of active gastrointestinal bleed. 2. Acute colitis involving the cecum through the distal descending colon. The distribution is most typical for an infectious or inflammatory etiology.    An ischemic etiology could be considered in the appropriate clinical setting but is less favored. 3. Moderate to severe atherosclerotic plaque with areas of mild stenosis in   the SMA but no major branch occlusion. 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 4. Cirrhotic configuration of the liver with small volume ascites and upper   abdominal varices. 5. Sequela of chronic pancreatitis. 6. Bony demineralization. Assessment/Plan:    Active Hospital Problems    Diagnosis     Pancolitis ([facility] [address] [K51.00]      Priority: Medium    Severe malnutrition ([facility] [address] [E43]      Melena  GI bleeding  Patient presented to emergency with more than 20 episodes of melena, due to history of cirrhosis concern for possible variceal bleed. EGD 9/1, pigmented spot and hiatal hernia with active oozing, Hemoclip placed, ligated. Plan. -Appreciate GI input  -Progress diet to regular (low-fat)   -H&H stable.   -Completed octreotide infusion  -IR consult for paracentesis    Acute blood loss anemia  Transfuse 1 unit  Continue to follow hb.   10--> 9.4--> 9.8--> 10.3--> 10.1  Check H&H daily     Hypokalemia. .   Replace. Liver cirrhosis  No ascites the time being, continue ceftriaxone for SBP prophylaxis. Colitis  Nonspecific colitis noted on CT scan  Appreciate GI input     History of DVT  Hold Eliquis for now, will restart after paracentesis if agreed upon by GI. DVT Prophylaxis: SCDs  Diet: ADULT DIET; Regular; Low Fat/Low Chol/High Fiber/2 gm Na  Code Status: Full Code  PT/OT Eval Status: baseline.      Dispo - pending clinical improvement      Loraine Jones MD

## 2022-09-05 NOTE — PROGRESS NOTES
Patient alert and oriented sitting in bed. No c/o pain. Upon assessing patient this nurse asked patient if she was having any shortness of breath. Patient than stated \" dont you people ready my chart! That's what Im here for! \" This nurse explained to patient we ask this every shift to assess patient status. This nurse than asked to listen to patients stomach and patient shook head \"no\" then covered self with towel. This nurse asked patient if there was anything she could erica her before she left. Patient yelled \"Just find my robe! \" Per patient robe was lost in ED during admission. Patient independent in room. Patient safe with call light in reach.

## 2022-09-06 ENCOUNTER — APPOINTMENT (OUTPATIENT)
Dept: ULTRASOUND IMAGING | Age: 67
DRG: 299 | End: 2022-09-06
Payer: MEDICARE

## 2022-09-06 VITALS
TEMPERATURE: 98.2 F | WEIGHT: 80.91 LBS | HEIGHT: 63 IN | RESPIRATION RATE: 20 BRPM | HEART RATE: 84 BPM | OXYGEN SATURATION: 92 % | SYSTOLIC BLOOD PRESSURE: 125 MMHG | DIASTOLIC BLOOD PRESSURE: 96 MMHG | BODY MASS INDEX: 14.34 KG/M2

## 2022-09-06 LAB
ALBUMIN FLUID: <0.2 G/DL
ALBUMIN SERPL-MCNC: 2.9 G/DL (ref 3.4–5)
ALP BLD-CCNC: 91 U/L (ref 40–129)
ALT SERPL-CCNC: 18 U/L (ref 10–40)
ANION GAP SERPL CALCULATED.3IONS-SCNC: 9 MMOL/L (ref 3–16)
APPEARANCE FLUID: NORMAL
AST SERPL-CCNC: 43 U/L (ref 15–37)
BASOPHILS ABSOLUTE: 0 K/UL (ref 0–0.2)
BASOPHILS RELATIVE PERCENT: 0.5 %
BILIRUB SERPL-MCNC: 1.2 MG/DL (ref 0–1)
BILIRUBIN DIRECT: 0.5 MG/DL (ref 0–0.3)
BILIRUBIN, INDIRECT: 0.7 MG/DL (ref 0–1)
BUN BLDV-MCNC: 7 MG/DL (ref 7–20)
CALCIUM SERPL-MCNC: 8 MG/DL (ref 8.3–10.6)
CELL COUNT FLUID TYPE: NORMAL
CHLORIDE BLD-SCNC: 105 MMOL/L (ref 99–110)
CLOT EVALUATION: NORMAL
CO2: 28 MMOL/L (ref 21–32)
COLOR FLUID: YELLOW
CREAT SERPL-MCNC: <0.5 MG/DL (ref 0.6–1.2)
EOSINOPHIL FLUID: 5 %
EOSINOPHILS ABSOLUTE: 0.1 K/UL (ref 0–0.6)
EOSINOPHILS RELATIVE PERCENT: 3 %
FLUID TYPE: NORMAL
GFR AFRICAN AMERICAN: >60
GFR NON-AFRICAN AMERICAN: >60
GLUCOSE BLD-MCNC: 91 MG/DL (ref 70–99)
HCT VFR BLD CALC: 29.9 % (ref 36–48)
HEMOGLOBIN: 9.9 G/DL (ref 12–16)
INR BLD: 1.62 (ref 0.87–1.14)
LYMPHOCYTES ABSOLUTE: 0.9 K/UL (ref 1–5.1)
LYMPHOCYTES RELATIVE PERCENT: 26.1 %
LYMPHOCYTES, BODY FLUID: 20 %
MACROPHAGE FLUID: 7 %
MCH RBC QN AUTO: 30.8 PG (ref 26–34)
MCHC RBC AUTO-ENTMCNC: 33.3 G/DL (ref 31–36)
MCV RBC AUTO: 92.4 FL (ref 80–100)
MONOCYTE, FLUID: 7 %
MONOCYTES ABSOLUTE: 0.4 K/UL (ref 0–1.3)
MONOCYTES RELATIVE PERCENT: 10.5 %
NEUTROPHIL, FLUID: 61 %
NEUTROPHILS ABSOLUTE: 2.2 K/UL (ref 1.7–7.7)
NEUTROPHILS RELATIVE PERCENT: 59.9 %
NUCLEATED CELLS FLUID: 112 /CUMM
NUMBER OF CELLS COUNTED FLUID: 44
PDW BLD-RTO: 16.2 % (ref 12.4–15.4)
PLATELET # BLD: 82 K/UL (ref 135–450)
PMV BLD AUTO: 9 FL (ref 5–10.5)
POTASSIUM SERPL-SCNC: 3.4 MMOL/L (ref 3.5–5.1)
PROTEIN FLUID: <0.2 G/DL
PROTHROMBIN TIME: 19.2 SEC (ref 11.7–14.5)
RBC # BLD: 3.23 M/UL (ref 4–5.2)
RBC FLUID: 2000 /CUMM
ROTAVIRUS ANTIGEN: NEGATIVE
SODIUM BLD-SCNC: 142 MMOL/L (ref 136–145)
TOTAL PROTEIN: 5 G/DL (ref 6.4–8.2)
WBC # BLD: 3.6 K/UL (ref 4–11)

## 2022-09-06 PROCEDURE — 6370000000 HC RX 637 (ALT 250 FOR IP): Performed by: STUDENT IN AN ORGANIZED HEALTH CARE EDUCATION/TRAINING PROGRAM

## 2022-09-06 PROCEDURE — 87205 SMEAR GRAM STAIN: CPT

## 2022-09-06 PROCEDURE — 89051 BODY FLUID CELL COUNT: CPT

## 2022-09-06 PROCEDURE — 80048 BASIC METABOLIC PNL TOTAL CA: CPT

## 2022-09-06 PROCEDURE — 6370000000 HC RX 637 (ALT 250 FOR IP): Performed by: HOSPITALIST

## 2022-09-06 PROCEDURE — 80076 HEPATIC FUNCTION PANEL: CPT

## 2022-09-06 PROCEDURE — C1729 CATH, DRAINAGE: HCPCS

## 2022-09-06 PROCEDURE — 0W9G3ZZ DRAINAGE OF PERITONEAL CAVITY, PERCUTANEOUS APPROACH: ICD-10-PCS | Performed by: RADIOLOGY

## 2022-09-06 PROCEDURE — 85610 PROTHROMBIN TIME: CPT

## 2022-09-06 PROCEDURE — 85025 COMPLETE CBC W/AUTO DIFF WBC: CPT

## 2022-09-06 PROCEDURE — 6370000000 HC RX 637 (ALT 250 FOR IP): Performed by: INTERNAL MEDICINE

## 2022-09-06 PROCEDURE — 84157 ASSAY OF PROTEIN OTHER: CPT

## 2022-09-06 PROCEDURE — 82042 OTHER SOURCE ALBUMIN QUAN EA: CPT

## 2022-09-06 PROCEDURE — 36415 COLL VENOUS BLD VENIPUNCTURE: CPT

## 2022-09-06 PROCEDURE — 87070 CULTURE OTHR SPECIMN AEROBIC: CPT

## 2022-09-06 PROCEDURE — 94760 N-INVAS EAR/PLS OXIMETRY 1: CPT

## 2022-09-06 PROCEDURE — 88112 CYTOPATH CELL ENHANCE TECH: CPT

## 2022-09-06 PROCEDURE — 88305 TISSUE EXAM BY PATHOLOGIST: CPT

## 2022-09-06 RX ORDER — POTASSIUM CHLORIDE 20 MEQ/1
40 TABLET, EXTENDED RELEASE ORAL ONCE
Status: COMPLETED | OUTPATIENT
Start: 2022-09-06 | End: 2022-09-06

## 2022-09-06 RX ORDER — CALCIUM CARBONATE 200(500)MG
1 TABLET,CHEWABLE ORAL PRN
Qty: 30 TABLET | Refills: 0 | Status: SHIPPED | OUTPATIENT
Start: 2022-09-06 | End: 2022-10-06

## 2022-09-06 RX ORDER — FUROSEMIDE 40 MG/1
40 TABLET ORAL 2 TIMES DAILY
Qty: 60 TABLET | Refills: 3 | Status: SHIPPED | OUTPATIENT
Start: 2022-09-07

## 2022-09-06 RX ORDER — FUROSEMIDE 40 MG/1
40 TABLET ORAL 2 TIMES DAILY
Qty: 60 TABLET | Refills: 3 | Status: SHIPPED | OUTPATIENT
Start: 2022-09-07 | End: 2022-09-06 | Stop reason: SDUPTHER

## 2022-09-06 RX ORDER — PANTOPRAZOLE SODIUM 40 MG/1
40 TABLET, DELAYED RELEASE ORAL
Qty: 30 TABLET | Refills: 3 | Status: SHIPPED | OUTPATIENT
Start: 2022-09-07 | End: 2022-09-06 | Stop reason: SDUPTHER

## 2022-09-06 RX ORDER — SPIRONOLACTONE 50 MG/1
50 TABLET, FILM COATED ORAL DAILY
Qty: 30 TABLET | Refills: 3 | Status: SHIPPED | OUTPATIENT
Start: 2022-09-07

## 2022-09-06 RX ORDER — PANTOPRAZOLE SODIUM 40 MG/1
40 TABLET, DELAYED RELEASE ORAL
Qty: 30 TABLET | Refills: 3 | Status: SHIPPED | OUTPATIENT
Start: 2022-09-07

## 2022-09-06 RX ORDER — SPIRONOLACTONE 50 MG/1
50 TABLET, FILM COATED ORAL DAILY
Qty: 30 TABLET | Refills: 3 | Status: SHIPPED | OUTPATIENT
Start: 2022-09-07 | End: 2022-09-06 | Stop reason: SDUPTHER

## 2022-09-06 RX ADMIN — FUROSEMIDE 40 MG: 40 TABLET ORAL at 16:10

## 2022-09-06 RX ADMIN — FUROSEMIDE 40 MG: 40 TABLET ORAL at 08:04

## 2022-09-06 RX ADMIN — ACETAMINOPHEN 650 MG: 325 TABLET, FILM COATED ORAL at 05:39

## 2022-09-06 RX ADMIN — POTASSIUM CHLORIDE 40 MEQ: 1500 TABLET, EXTENDED RELEASE ORAL at 12:19

## 2022-09-06 RX ADMIN — SPIRONOLACTONE 50 MG: 25 TABLET ORAL at 08:04

## 2022-09-06 RX ADMIN — PANTOPRAZOLE SODIUM 40 MG: 40 TABLET, DELAYED RELEASE ORAL at 05:37

## 2022-09-06 RX ADMIN — PANTOPRAZOLE SODIUM 40 MG: 40 TABLET, DELAYED RELEASE ORAL at 16:09

## 2022-09-06 ASSESSMENT — PAIN DESCRIPTION - DESCRIPTORS: DESCRIPTORS: ACHING

## 2022-09-06 ASSESSMENT — PAIN DESCRIPTION - LOCATION: LOCATION: HEAD

## 2022-09-06 ASSESSMENT — PAIN SCALES - GENERAL
PAINLEVEL_OUTOF10: 10
PAINLEVEL_OUTOF10: 0

## 2022-09-06 NOTE — PROGRESS NOTES
Comprehensive Nutrition Assessment    Type and Reason for Visit:  Reassess    Nutrition Recommendations/Plan:   Regular; Low Fat/Low Chol/High Fiber/2 gm Na diet   Ensure HP TID (pt requesting vanilla and this is not available in [facility] currently)     Malnutrition Assessment:  Malnutrition Status:  Severe malnutrition (09/02/22 1307)    Context:  Chronic Illness     Findings of the 6 clinical characteristics of malnutrition:  Energy Intake:  Unable to assess  Weight Loss:  Unable to assess     Body Fat Loss:  Severe body fat loss Triceps   Muscle Mass Loss:  Severe muscle mass loss Temples (temporalis), Clavicles (pectoralis & deltoids)  Fluid Accumulation:  No significant fluid accumulation     Strength:  Not Performed    Nutrition Assessment:    Follow-up. Plan for paracentesis today. Pt now on solids. Reports she is tolerating and appetite is improving a little. Per records, is eating most of meals. Is favorable to ONS. Nutrition Related Findings:    BM today Wound Type: None       Current Nutrition Intake & Therapies:    Average Meal Intake: %  Average Supplements Intake: None Ordered  ADULT DIET; Regular; Low Fat/Low Chol/High Fiber/2 gm Na    Anthropometric Measures:  Height: 5' 3\" (160 cm)  Ideal Body Weight (IBW): 115 lbs (52 kg)       Current Body Weight: 80 lb (36.3 kg), 72.2 % IBW.  Weight Source: Bed Scale  Current BMI (kg/m2): 14.2                          BMI Categories: Underweight (BMI less than 22) age over 72    Estimated Daily Nutrient Needs:        Energy (kcal/day): 1140 -1330 kcal (30-35 kcal/kg 38 kg CBW)     Protein (g/day): > 57 g (1.5 gm/kg 38 kg CBW)     Fluid (ml/day): per provider    Nutrition Diagnosis:   Severe malnutrition related to inadequate protein-energy intake as evidenced by Criteria as identified in malnutrition assessment    Nutrition Interventions:   Food and/or Nutrient Delivery: Continue Current Diet, Start Oral Nutrition Supplement  Nutrition Education/Counseling: No recommendation at this time  Coordination of Nutrition Care: Continue to monitor while inpatient       Goals:  Previous Goal Met: Progressing toward Goal(s)  Goals: Initiate PO diet, within 2 days       Nutrition Monitoring and Evaluation:   Behavioral-Environmental Outcomes: None Identified  Food/Nutrient Intake Outcomes: Food and Nutrient Intake, Supplement Intake  Physical Signs/Symptoms Outcomes: Biochemical Data, Weight, Nutrition Focused Physical Findings, Skin, GI Status    Discharge Planning:     Too soon to determine     Kenia Smith, [address], [place]  Contact: [phone]

## 2022-09-06 NOTE — DISCHARGE SUMMARY
Hospital Medicine Discharge Summary    Patient ID: Binta Price      Patient's PCP: Candido Hinojosa, RN, NP    Admit Date: 9/1/2022     Discharge Date:   09/06/22      Admitting Provider: Owen Ball MD     Discharge Provider: Loraine Jones MD     Discharge Diagnoses: Active Hospital Problems    Diagnosis     Pancolitis ([facility] [address] [K51.00]      Priority: Medium    Severe malnutrition ([facility] [address] [E43]        The patient was seen and examined on day of discharge and this discharge summary is in conjunction with any daily progress note from day of discharge. Hospital Course:     79 y.o. female who presents to [facility] with melena. Patient with a past medical history of PE/DVT on Eliquis, liver cirrhosis (likely secondary to alcohol), patient presented with melena. Admitted with multiple episodes of melena, EGD 9/1, pigmented spot and hiatal hernia with active oozing, Hemoclip placed, ligated. Melena  GI bleeding  Patient presented to emergency with more than 20 episodes of melena, due to history of cirrhosis concern for possible variceal bleed. EGD 9/1, pigmented spot and hiatal hernia with active oozing, Hemoclip placed, ligated. Acute blood loss anemia  Transfuse 1 unit  Hb stable      Hypokalemia. .   Replace. Liver cirrhosis  No ascites the time being, 55 days of ceftriaxone for the SBP's prophylaxis  Underwent paracentesis 9/6. Colitis  Nonspecific colitis noted on CT scan  Appreciate GI input     History of DVT  Hold Eliquis for now, will restart after paracentesis if agreed upon by GI. Physical Exam Performed:     BP (!) 125/96   Pulse 84   Temp 98.2 °F (36.8 °C) (Oral)   Resp 20   Ht 5' 3\" (1.6 m)   Wt 80 lb 14.5 oz (36.7 kg) Comment: removed purse, blankets and got current weight notfied rn  SpO2 92%   BMI 14.33 kg/m²       General appearance:  No apparent distress, appears stated age and cooperative.   HEENT:  Normal cephalic, atraumatic without obvious deformity. Pupils equal, round, and reactive to light. Extra ocular muscles intact. Conjunctivae/corneas clear. Neck: Supple, with full range of motion. No jugular venous distention. Trachea midline. Respiratory:  Normal respiratory effort. Clear to auscultation, bilaterally without Rales/Wheezes/Rhonchi. Cardiovascular:  Regular rate and rhythm with normal S1/S2 without murmurs, rubs or gallops. Abdomen: Soft, non-tender, non-distended with normal bowel sounds. Musculoskeletal:  No clubbing, cyanosis or edema bilaterally. Full range of motion without deformity. Skin: Skin color, texture, turgor normal.  No rashes or lesions. Neurologic:  Neurovascularly intact without any focal sensory/motor deficits. Cranial nerves: II-XII intact, grossly non-focal.  Psychiatric:  Alert and oriented, thought content appropriate, normal insight  Capillary Refill: Brisk,< 3 seconds   Peripheral Pulses: +2 palpable, equal bilaterally       Labs: For convenience and continuity at follow-up the following most recent labs are provided:      CBC:    Lab Results   Component Value Date/Time    WBC 3.6 09/06/2022 04:45 AM    HGB 9.9 09/06/2022 04:45 AM    HCT 29.9 09/06/2022 04:45 AM    PLT 82 09/06/2022 04:45 AM       Renal:    Lab Results   Component Value Date/Time     09/06/2022 04:45 AM    K 3.4 09/06/2022 04:45 AM    K 3.8 09/04/2022 07:16 AM     09/06/2022 04:45 AM    CO2 28 09/06/2022 04:45 AM    BUN 7 09/06/2022 04:45 AM    CREATININE <0.5 09/06/2022 04:45 AM    CALCIUM 8.0 09/06/2022 04:45 AM    PHOS 4.0 03/14/2019 06:12 AM         Significant Diagnostic Studies    Radiology:   US GUIDED PARACENTESIS   Final Result   Successful ultrasound-guided paracentesis as above. US ABDOMEN LIMITED Specify organ? LIVER   Final Result   Cirrhosis with ascites and nonspecific gallbladder wall thickening. CTA ABDOMEN PELVIS W CONTRAST   Final Result   1. No evidence of active gastrointestinal bleed. 2. Acute colitis involving the cecum through the distal descending colon. The distribution is most typical for an infectious or inflammatory etiology. An ischemic etiology could be considered in the appropriate clinical setting   but is less favored. 3. Moderate to severe atherosclerotic plaque with areas of mild stenosis in   the SMA but no major branch occlusion. 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 4. Cirrhotic configuration of the liver with small volume ascites and upper   abdominal varices. 5. Sequela of chronic pancreatitis. 6. Bony demineralization. Consults:     IP CONSULT TO GI  IP CONSULT TO SOCIAL WORK  IP CONSULT TO PULMONOLOGY  IP CONSULT TO INTERVENTIONAL RADIOLOGY    Disposition:  home      Condition at Discharge: Stable    Discharge Instructions/Follow-up:    -Furosemide and spironolactone for ascites. -Follow-up with GI  -PPI twice daily    Code Status:  Full Code     Activity: activity as tolerated    Diet: regular diet      Discharge Medications:     Current Discharge Medication List             Details   spironolactone (ALDACTONE) 50 MG tablet Take 1 tablet by mouth daily  Qty: 30 tablet, Refills: 3                Details   furosemide (LASIX) 40 MG tablet Take 1 tablet by mouth in the morning and 1 tablet in the evening.   Qty: 60 tablet, Refills: 3      pantoprazole (PROTONIX) 40 MG tablet Take 1 tablet by mouth 2 times daily (before meals)  Qty: 30 tablet, Refills: 3                Details   apixaban (ELIQUIS) 5 MG TABS tablet Take 1 tablet by mouth 2 times daily  Qty: 60 tablet, Refills: 0      lipase-protease-amylase (CREON) 66022-64359 units delayed release capsule Take 2 capsules by mouth 3 times daily (with meals)  Qty: 180 capsule, Refills: 0      phosphorus (K PHOS NEUTRAL) 155-852-130 MG tablet Take 1 tablet by mouth 2 times daily  Qty: 60 tablet, Refills: 3      Multiple Vitamin (MULTIVITAMIN+) LIQD solution Take 15 mLs by mouth daily  Qty: 450 mL, Refills: 0      calcium carbonate (TUMS) 500 MG chewable tablet Take 1 tablet by mouth as needed for Heartburn      aspirin 81 MG tablet Take 81 mg by mouth daily             Time Spent on discharge is more than 30 minutes in the examination, evaluation, counseling and review of medications and discharge plan. Signed:    Loraine Jones MD   9/6/2022      Thank you Candido Hinojosa RN, NP for the opportunity to be involved in this patient's care. If you have any questions or concerns, please feel free to contact me at [phone].

## 2022-09-06 NOTE — PROGRESS NOTES
INPATIENT PROGRESS NOTE        IDENTIFYING DATA/REASON FOR CONSULTATION   PATIENT:  Juliana Tolliver  MRN:  [id number]  ADMIT DATE: 2022  TIME OF EVALUATION: 2022 7:06 AM  HOSPITAL STAY:   LOS: 5 days   CONSULTING PHYSICIAN: Vidya Rahman MD   REASON FOR CONSULTATION: upper GI bleed    Subjective:    Patient seen in follow up   She has no complaints  No reported melena   Awaiting for paracentesis today    MEDICATIONS   SCHEDULED:  furosemide, 40 mg, BID  spironolactone, 50 mg, Daily  pantoprazole, 40 mg, BID AC  sodium chloride flush, 5-40 mL, 2 times per day      FLUIDS/DRIPS:     sodium chloride      sodium chloride       PRNs: simethicone, 80 mg, Q6H PRN  zolpidem, 5 mg, Nightly PRN  potassium chloride, 10 mEq, PRN  sodium chloride flush, 5-40 mL, PRN  sodium chloride, , PRN  ondansetron, 4 mg, Q8H PRN   Or  ondansetron, 4 mg, Q6H PRN  acetaminophen, 650 mg, Q6H PRN   Or  acetaminophen, 650 mg, Q6H PRN  sodium chloride, , PRN  melatonin, 6 mg, Nightly PRN  diphenhydrAMINE, 25 mg, Nightly PRN      ALLERGIES:  No Known Allergies      PHYSICAL EXAM   VITALS:  BP (!) 125/93   Pulse 83   Temp 98.1 °F (36.7 °C) (Oral)   Resp 20   Ht 5' 3\" (1.6 m)   Wt 80 lb 14.5 oz (36.7 kg) Comment: removed purse, blankets and got current weight notfied rn  SpO2 91%   BMI 14.33 kg/m²   TEMPERATURE:  Current - Temp: 98.1 °F (36.7 °C);  Max - Temp  Av.1 °F (36.7 °C)  Min: 97.7 °F (36.5 °C)  Max: 98.4 °F (36.9 °C)    Physical Exam:  General appearance: alert, cooperative, no distress  Eyes: Anicteric  Head: Normocephalic, without obvious abnormality  Lungs: clear to auscultation bilaterally, Normal Effort  Heart: regular rate and rhythm,  Abdomen: distended, nontender   Extremities: atraumatic, no cyanosis or edema  Skin: warm and dry, no jaundice  Neuro: Grossly intact, A&OX3    LABS AND IMAGING   Laboratory   Recent Labs     22  0716 22  0444 22  0445   WBC 3.6* 3.5* 3.6*   HGB 10.3* 10.1* 9.9* HCT 30.1* 29.3* 29.9*   MCV 91.6 90.9 92.4   PLT 78* 78* 82*     Recent Labs     09/04/22  0716 09/05/22  0444 09/06/22 0445    141 142   K 3.8  3.8 3.7 3.4*    109 105   CO2 23 24 28   BUN 8 8 7   CREATININE <0.5* 0.5* <0.5*     Recent Labs     09/04/22  0716 09/05/22  0444 09/06/22 0445   AST 49* 42* 43*   ALT 17 17 18   BILIDIR 0.8* 0.7* 0.5*   BILITOT 1.6* 1.3* 1.2*   ALKPHOS 88 93 91     No results for input(s): LIPASE, AMYLASE in the last 72 hours. Recent Labs     09/04/22  0716 09/05/22 0444 09/06/22 0445   PROTIME 20.7* 20.7* 19.2*   INR 1.78* 1.78* 1.62*         Imaging  US ABDOMEN LIMITED Specify organ? LIVER   Final Result   Cirrhosis with ascites and nonspecific gallbladder wall thickening. CTA ABDOMEN PELVIS W CONTRAST   Final Result   1. No evidence of active gastrointestinal bleed. 2. Acute colitis involving the cecum through the distal descending colon. The distribution is most typical for an infectious or inflammatory etiology. An ischemic etiology could be considered in the appropriate clinical setting   but is less favored. 3. Moderate to severe atherosclerotic plaque with areas of mild stenosis in   the SMA but no major branch occlusion. 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 4. Cirrhotic configuration of the liver with small volume ascites and upper   abdominal varices. 5. Sequela of chronic pancreatitis. 6. Bony demineralization. Endoscopy  EGD 9/1/22:  2 cm tongue of salmon-colored mucosa extending from the proximal edge of the gastric folds 34 cm from the incisors, suggestive of short segment Norman's. The distal esophagus was examined with white light and narrowband imaging, but biopsies were not performed. Scarring and nonobstructive Schatzki's rings in the distal esophagus  Pigmented spot in the proximal hiatal hernia sac with active oozing.   Due to concern for Teresa Pilling erosion, a Hemoclip was placed over top of this lesion. Following Hemoclip placement, there was concern for a gastric varix column feeding this area. As a result, the colon was band ligated with a single band with decompression and hemostasis. Small sliding hiatal hernia  Severe portal hypertensive gastropathy  Nonbleeding duodenal erosions in the duodenal bulb    ASSESSMENT AND RECOMMENDATIONS   Ricky Ortega is a 79 y.o. female with PMH of EtOH cirrhosis, chronic calcific pancreatitis with pancreatic pseudocyst, DVT on Eliquis, and history of iron deficiency anemia due to presumed small bowel angiectasia's who presented [facility] 9/1/2022 with melena. 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 Patient is on PPI and completed octreotide drip. Monitor H&H and transfuse to hemoglobin greater than 7. Continue ceftriaxone for SBP prophylaxis (on day 5). Colitis: Patient with nonspecific colitis on CT. Recent colonoscopy in March 2019 without evidence of colitis. Suspect CT findings represent hypoalbuminemia and mucosal edema 2/2 liver disease. 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 Will need surveillance EGD in 4 weeks, and addition of Coreg at discharge. -Ascites: History of ascites 2/2 portal hypertension. pt with worsening abdominal distention. Paracentesis ordered. On ceftriaxone for SBP prophylaxis.   -Immune status: Unknown  -HCC screening: We will obtain right upper quadrant ultrasound    Chronic calcific pancreatitis  Diarrhea: Resolved, infectious stool studies negative. VTE on Eliquis: Hold Eliquis    RECOMMENDATIONS:    Paracentesis today  Complete Ceftriaxone for SBP prophylaxis (on day 5/5)  Continue lasix 40 mg BID and aldactone 50 mg daily  2 gm NA diet  Encourage ongoing alcohol abstinence    If you have any questions or need any further information, please feel free to contact us [phone].   Thank you for allowing us to participate in the care of [facility]. The note was completed using Dragon voice recognition transcription. Every effort was made to ensure accuracy; however, inadvertent transcription errors may be present despite my best efforts to edit errors. Siva GRIFFIN    Attending physician addendum:      I have personally seen and examined the patient, reviewed the patient's medical record and pertinent labs and clinical imaging. I have personally staffed the case with GABY Garcia. I agree with her consultation note, exam findings, assessment and plans  as written above. I have made appropriate modifications and edited her assessment and plan where needed to reflect my impression and plans for this patient. Patient is a 66-year-old female with known history of suspected alcohol induced cirrhosis, chronic calcific pancreatitis, splenic vein thrombosis presenting with acute blood loss anemia. She was found to have  isolated gastric varices with stigmata of bleeding status post endoscopic therapy of IGV 2 with endoscopic clipping and banding. BP (!) 125/96   Pulse 84   Temp 98.2 °F (36.8 °C) (Oral)   Resp 20   Ht 5' 3\" (1.6 m)   Wt 80 lb 14.5 oz (36.7 kg) Comment: removed purse, blankets and got current weight notfied rn  SpO2 92%   BMI 14.33 kg/m²      Gen- NAD  Abd- soft, distended, + fluid wave  CV- RRR  Lungs- CTAB    Impression:  1) Acute blood loss anemia- stable. secondary to isolated gastric varices type I with bleeding stigmata. Likely related to patient's known history of splenic vein thrombosis and cirrhosis. 2) Acute diarrhea- resolved. Patient had right sided colonic wall thickening-wall thickening of the cecum and ascending colon. This may be related to the patient's ascites. She is not having any diarrhea currently. She might benefit from an outpatient colonoscopy at the time of repeat endoscopy with Dr. Gela Terrell.   She did have significant SMA stenosis noted on CT scan raising the possibility of possible occult ischemic colitis    3) Etoh cirrhosis with high SAAG ascites- No SBP. On Lasix 40mg po bid and Aldactone 50mg daily  4) Chronic calcific pancreatitis   5) Chronically occluded splenic vein thrombosis- likely related to #4  6) History of VTE- Eliquis on hold    Plan:  Continue pantoprazole 40mg bid  ? Benefit of non-cardioselective beta blocker if BP tolerates  Follow up with Dr. Narciso Gay outpatient  Lasix 40mg po bid and spironolactone 50mg daily. Repeat EGD and colonoscopy with Dr. Narciso Gay as outpatient. Hold eliquis  Day 5/5 Rocephin for SBP prophylaxis  Dispo- home in am if stable. Thank you for allowing me to participate in this patient's care. If there are any questions or concerns regarding this patient, or the plan we have set in place, please feel free to contact me at [phone].      Carlos Brown, DO

## 2022-09-06 NOTE — CARE COORDINATION
09/06/22 1013   IMM Letter   IMM Letter given to Patient/Family/Significant other/Guardian/POA/by: Provided to patient at bedside by Kristina Hampton RN. Education provided to patient, patient reported no questions and verbalized understanding. Patient aware of 4 hours allotted time to determine if they choose to pursue Medicare appeal process.    IMM Letter date given: 09/06/22   IMM Letter time given: [phone]  Electronically signed by Kristina Hampton RN on 9/6/2022 at 10:13 AM

## 2022-09-06 NOTE — PLAN OF CARE
Problem: Discharge Planning  Goal: Discharge to home or other facility with appropriate resources  9/2/2022 1044 by Nery Foster RN  Outcome: Progressing     Problem: Pain  Goal: Verbalizes/displays adequate comfort level or baseline comfort level  9/2/2022 1044 by Nery Foster RN  Outcome: Progressing     Problem: Skin/Tissue Integrity  Goal: Absence of new skin breakdown  Description: 1. Monitor for areas of redness and/or skin breakdown  2. Assess vascular access sites hourly  3. Every 4-6 hours minimum:  Change oxygen saturation probe site  4. Every 4-6 hours:  If on nasal continuous positive airway pressure, respiratory therapy assess nares and determine need for appliance change or resting period.   9/2/2022 1044 by Nery Foster RN  Outcome: Progressing     Problem: ABCDS Injury Assessment  Goal: Absence of physical injury  9/2/2022 1044 by Nery Foster RN  Outcome: Progressing     Problem: Safety - Adult  Goal: Free from fall injury  9/2/2022 1044 by Nery Foster RN  Outcome: Progressing
Problem: Discharge Planning  Goal: Discharge to home or other facility with appropriate resources  9/2/2022 1832 by Carson Beaulieu RN  Outcome: Progressing     Problem: Pain  Goal: Verbalizes/displays adequate comfort level or baseline comfort level  9/2/2022 1832 by Carson Beaulieu RN  Outcome: Progressing     Problem: Skin/Tissue Integrity  Goal: Absence of new skin breakdown  Description: 1. Monitor for areas of redness and/or skin breakdown  2. Assess vascular access sites hourly  3. Every 4-6 hours minimum:  Change oxygen saturation probe site  4. Every 4-6 hours:  If on nasal continuous positive airway pressure, respiratory therapy assess nares and determine need for appliance change or resting period.   9/2/2022 1832 by Carson Beaulieu RN  Outcome: Progressing     Problem: ABCDS Injury Assessment  Goal: Absence of physical injury  9/2/2022 1832 by Carson Beaulieu RN  Outcome: Progressing     Problem: Safety - Adult  Goal: Free from fall injury  9/2/2022 1832 by Carson Beaulieu RN  Outcome: Progressing     Problem: Nutrition Deficit:  Goal: Optimize nutritional status  Outcome: Progressing
Problem: Discharge Planning  Goal: Discharge to home or other facility with appropriate resources  9/3/2022 0023 by Georgiana Felder RN  Outcome: Progressing  Flowsheets (Taken 9/3/2022 0023)  Discharge to home or other facility with appropriate resources:   Identify barriers to discharge with patient and caregiver   Arrange for needed discharge resources and transportation as appropriate   Identify discharge learning needs (meds, wound care, etc)     Problem: Pain  Goal: Verbalizes/displays adequate comfort level or baseline comfort level  9/3/2022 0023 by Georgiana Felder RN  Outcome: Progressing  Flowsheets (Taken 9/3/2022 0023)  Verbalizes/displays adequate comfort level or baseline comfort level:   Encourage patient to monitor pain and request assistance   Assess pain using appropriate pain scale   Administer analgesics based on type and severity of pain and evaluate response   Implement non-pharmacological measures as appropriate and evaluate response     Problem: Skin/Tissue Integrity  Goal: Absence of new skin breakdown  9/3/2022 0023 by Georgiana Felder RN  Outcome: Progressing     Problem: ABCDS Injury Assessment  Goal: Absence of physical injury  9/3/2022 0023 by Georgiana Felder RN  Outcome: Progressing  Flowsheets (Taken 9/3/2022 0023)  Absence of Physical Injury: Implement safety measures based on patient assessment     Problem: Safety - Adult  Goal: Free from fall injury  9/3/2022 0023 by Georgiana Felder RN  Outcome: Progressing  Flowsheets (Taken 9/3/2022 0023)  Free From Fall Injury: Instruct family/caregiver on patient safety     Problem: Nutrition Deficit:  Goal: Optimize nutritional status  9/3/2022 0023 by Georgiana Felder RN  Outcome: Progressing  Flowsheets (Taken 9/3/2022 0023)  Nutrient intake appropriate for improving, restoring, or maintaining nutritional needs:   Assess nutritional status and recommend course of action   Recommend appropriate diets, oral nutritional
Problem: Discharge Planning  Goal: Discharge to home or other facility with appropriate resources  Outcome: Progressing   Alert and oriented x4 Resp. Easy and even Sats. WNL on RA Lungs diminished in bases Cough and deep breathing exercises encouraged No c/o pain Up to BSC indep.  Free from fall/injury Frequently turns and repositions self
Problem: Discharge Planning  Goal: Discharge to home or other facility with appropriate resources  Outcome: Progressing  Flowsheets  Taken 9/5/2022 2107 by Maryellen Goldman RN  Discharge to home or other facility with appropriate resources: Identify barriers to discharge with patient and caregiver       Problem: Pain  Goal: Verbalizes/displays adequate comfort level or baseline comfort level  Outcome: Progressing       Problem: Skin/Tissue Integrity  Goal: Absence of new skin breakdown  Description: 1. Monitor for areas of redness and/or skin breakdown  2. Assess vascular access sites hourly  3. Every 4-6 hours minimum:  Change oxygen saturation probe site  4. Every 4-6 hours:  If on nasal continuous positive airway pressure, respiratory therapy assess nares and determine need for appliance change or resting period.   Outcome: Progressing     Problem: ABCDS Injury Assessment  Goal: Absence of physical injury  Outcome: Progressing     Problem: Safety - Adult  Goal: Free from fall injury  Outcome: Progressing     Problem: Nutrition Deficit:  Goal: Optimize nutritional status  Outcome: Progressing
Problem: Discharge Planning  Goal: Discharge to home or other facility with appropriate resources  Outcome: Progressing  Flowsheets (Taken 9/1/2022 5534 by Jeannie Cruz RN)  Discharge to home or other facility with appropriate resources:   Identify barriers to discharge with patient and caregiver   Identify discharge learning needs (meds, wound care, etc)   Refer to discharge planning if patient needs post-hospital services based on physician order or complex needs related to functional status, cognitive ability or social support system   Arrange for needed discharge resources and transportation as appropriate     Problem: Pain  Goal: Verbalizes/displays adequate comfort level or baseline comfort level  Outcome: Progressing     Problem: Skin/Tissue Integrity  Goal: Absence of new skin breakdown  Description: 1. Monitor for areas of redness and/or skin breakdown  2. Assess vascular access sites hourly  3. Every 4-6 hours minimum:  Change oxygen saturation probe site  4. Every 4-6 hours:  If on nasal continuous positive airway pressure, respiratory therapy assess nares and determine need for appliance change or resting period.   Outcome: Progressing     Problem: ABCDS Injury Assessment  Goal: Absence of physical injury  Outcome: Progressing     Problem: Safety - Adult  Goal: Free from fall injury  Outcome: Progressing
Problem: Pain  Goal: Verbalizes/displays adequate comfort level or baseline comfort level  Outcome: Progressing     Problem: Skin/Tissue Integrity  Goal: Absence of new skin breakdown  Description: 1. Monitor for areas of redness and/or skin breakdown  2. Assess vascular access sites hourly  3. Every 4-6 hours minimum:  Change oxygen saturation probe site  4. Every 4-6 hours:  If on nasal continuous positive airway pressure, respiratory therapy assess nares and determine need for appliance change or resting period.   Outcome: Progressing     Problem: ABCDS Injury Assessment  Goal: Absence of physical injury  Outcome: Progressing     Problem: Safety - Adult  Goal: Free from fall injury  Outcome: Progressing     Problem: Nutrition Deficit:  Goal: Optimize nutritional status  Outcome: Progressing
by Marya Nelson RN  Outcome: Progressing

## 2022-09-09 LAB
BODY FLUID CULTURE, STERILE: NORMAL
GRAM STAIN RESULT: NORMAL

## 2023-07-01 ENCOUNTER — APPOINTMENT (OUTPATIENT)
Dept: GENERAL RADIOLOGY | Age: 68
End: 2023-07-01
Payer: MEDICARE

## 2023-07-01 ENCOUNTER — APPOINTMENT (OUTPATIENT)
Dept: CT IMAGING | Age: 68
End: 2023-07-01
Payer: MEDICARE

## 2023-07-01 ENCOUNTER — HOSPITAL ENCOUNTER (INPATIENT)
Age: 68
LOS: 6 days | Discharge: HOME | End: 2023-07-07
Attending: EMERGENCY MEDICINE | Admitting: STUDENT IN AN ORGANIZED HEALTH CARE EDUCATION/TRAINING PROGRAM
Payer: MEDICARE

## 2023-07-01 DIAGNOSIS — R94.31 ABNORMAL EKG: ICD-10-CM

## 2023-07-01 DIAGNOSIS — D68.59 HYPERPROTHROMBINEMIA (HCC): ICD-10-CM

## 2023-07-01 DIAGNOSIS — J44.9 CHRONIC OBSTRUCTIVE PULMONARY DISEASE, UNSPECIFIED COPD TYPE (HCC): ICD-10-CM

## 2023-07-01 DIAGNOSIS — K76.0 HEPATIC STEATOSIS: ICD-10-CM

## 2023-07-01 DIAGNOSIS — K92.2 UGI BLEED: Primary | ICD-10-CM

## 2023-07-01 DIAGNOSIS — D50.0 ANEMIA DUE TO GI BLOOD LOSS: ICD-10-CM

## 2023-07-01 DIAGNOSIS — K92.1 MELENA: ICD-10-CM

## 2023-07-01 DIAGNOSIS — R17 ELEVATED BILIRUBIN: ICD-10-CM

## 2023-07-01 DIAGNOSIS — R74.8 ELEVATED LIPASE: ICD-10-CM

## 2023-07-01 DIAGNOSIS — K70.30 ALCOHOLIC CIRRHOSIS, UNSPECIFIED WHETHER ASCITES PRESENT (HCC): ICD-10-CM

## 2023-07-01 DIAGNOSIS — F17.200 SMOKER: ICD-10-CM

## 2023-07-01 PROBLEM — D64.9 SYMPTOMATIC ANEMIA: Status: ACTIVE | Noted: 2023-07-01

## 2023-07-01 LAB
ABO + RH BLD: NORMAL
ALBUMIN SERPL-MCNC: 3.1 G/DL (ref 3.4–5)
ALP SERPL-CCNC: 151 U/L (ref 40–129)
ALT SERPL-CCNC: 23 U/L (ref 10–40)
ANION GAP SERPL CALCULATED.3IONS-SCNC: 15 MMOL/L (ref 3–16)
APTT BLD: 38.7 SEC (ref 22.7–35.9)
AST SERPL-CCNC: 68 U/L (ref 15–37)
BACTERIA URNS QL MICRO: ABNORMAL /HPF
BILIRUB DIRECT SERPL-MCNC: 0.8 MG/DL (ref 0–0.3)
BILIRUB INDIRECT SERPL-MCNC: 1.8 MG/DL (ref 0–1)
BILIRUB SERPL-MCNC: 2.6 MG/DL (ref 0–1)
BILIRUB UR QL STRIP.AUTO: NEGATIVE
BLD GP AB SCN SERPL QL: NORMAL
BUN SERPL-MCNC: 21 MG/DL (ref 7–20)
CALCIUM SERPL-MCNC: 8.7 MG/DL (ref 8.3–10.6)
CHLORIDE SERPL-SCNC: 99 MMOL/L (ref 99–110)
CLARITY UR: CLEAR
CO2 SERPL-SCNC: 20 MMOL/L (ref 21–32)
COLOR UR: ABNORMAL
CREAT SERPL-MCNC: <0.5 MG/DL (ref 0.6–1.2)
EPI CELLS #/AREA URNS AUTO: 2 /HPF (ref 0–5)
GFR SERPLBLD CREATININE-BSD FMLA CKD-EPI: >60 ML/MIN/{1.73_M2}
GLUCOSE SERPL-MCNC: 103 MG/DL (ref 70–99)
GLUCOSE UR STRIP.AUTO-MCNC: NEGATIVE MG/DL
HEMOCCULT STL QL: ABNORMAL
HGB UR QL STRIP.AUTO: NEGATIVE
HYALINE CASTS #/AREA URNS AUTO: 1 /LPF (ref 0–8)
INR PPP: 2.95 (ref 0.84–1.16)
KETONES UR STRIP.AUTO-MCNC: 15 MG/DL
LEUKOCYTE ESTERASE UR QL STRIP.AUTO: ABNORMAL
LIPASE SERPL-CCNC: 133 U/L (ref 13–60)
NITRITE UR QL STRIP.AUTO: NEGATIVE
NT-PROBNP SERPL-MCNC: 467 PG/ML (ref 0–124)
PH UR STRIP.AUTO: 5.5 [PH] (ref 5–8)
POTASSIUM SERPL-SCNC: 5.5 MMOL/L (ref 3.5–5.1)
PROT SERPL-MCNC: 5.4 G/DL (ref 6.4–8.2)
PROT UR STRIP.AUTO-MCNC: NEGATIVE MG/DL
PROTHROMBIN TIME: 30.5 SEC (ref 11.5–14.8)
RBC CLUMPS #/AREA URNS AUTO: 1 /HPF (ref 0–4)
SODIUM SERPL-SCNC: 134 MMOL/L (ref 136–145)
SP GR UR STRIP.AUTO: 1.02 (ref 1–1.03)
TROPONIN, HIGH SENSITIVITY: 20 NG/L (ref 0–14)
UA COMPLETE W REFLEX CULTURE PNL UR: ABNORMAL
UA DIPSTICK W REFLEX MICRO PNL UR: YES
URN SPEC COLLECT METH UR: ABNORMAL
UROBILINOGEN UR STRIP-ACNC: 1 E.U./DL
WBC #/AREA URNS AUTO: 8 /HPF (ref 0–5)

## 2023-07-01 PROCEDURE — 71045 X-RAY EXAM CHEST 1 VIEW: CPT

## 2023-07-01 PROCEDURE — 99285 EMERGENCY DEPT VISIT HI MDM: CPT

## 2023-07-01 PROCEDURE — 81001 URINALYSIS AUTO W/SCOPE: CPT

## 2023-07-01 PROCEDURE — P9016 RBC LEUKOCYTES REDUCED: HCPCS

## 2023-07-01 PROCEDURE — 1200000000 HC SEMI PRIVATE

## 2023-07-01 PROCEDURE — 6360000004 HC RX CONTRAST MEDICATION: Performed by: PHYSICIAN ASSISTANT

## 2023-07-01 PROCEDURE — 86923 COMPATIBILITY TEST ELECTRIC: CPT

## 2023-07-01 PROCEDURE — 80048 BASIC METABOLIC PNL TOTAL CA: CPT

## 2023-07-01 PROCEDURE — C9113 INJ PANTOPRAZOLE SODIUM, VIA: HCPCS | Performed by: PHYSICIAN ASSISTANT

## 2023-07-01 PROCEDURE — 83690 ASSAY OF LIPASE: CPT

## 2023-07-01 PROCEDURE — P9017 PLASMA 1 DONOR FRZ W/IN 8 HR: HCPCS

## 2023-07-01 PROCEDURE — 2580000003 HC RX 258: Performed by: PHYSICIAN ASSISTANT

## 2023-07-01 PROCEDURE — 96374 THER/PROPH/DIAG INJ IV PUSH: CPT

## 2023-07-01 PROCEDURE — 93005 ELECTROCARDIOGRAM TRACING: CPT | Performed by: EMERGENCY MEDICINE

## 2023-07-01 PROCEDURE — 85730 THROMBOPLASTIN TIME PARTIAL: CPT

## 2023-07-01 PROCEDURE — 74174 CTA ABD&PLVS W/CONTRAST: CPT

## 2023-07-01 PROCEDURE — 86901 BLOOD TYPING SEROLOGIC RH(D): CPT

## 2023-07-01 PROCEDURE — 6360000002 HC RX W HCPCS: Performed by: PHYSICIAN ASSISTANT

## 2023-07-01 PROCEDURE — 2060000000 HC ICU INTERMEDIATE R&B

## 2023-07-01 PROCEDURE — 82270 OCCULT BLOOD FECES: CPT

## 2023-07-01 PROCEDURE — A4216 STERILE WATER/SALINE, 10 ML: HCPCS | Performed by: PHYSICIAN ASSISTANT

## 2023-07-01 PROCEDURE — 83880 ASSAY OF NATRIURETIC PEPTIDE: CPT

## 2023-07-01 PROCEDURE — 80076 HEPATIC FUNCTION PANEL: CPT

## 2023-07-01 PROCEDURE — 85025 COMPLETE CBC W/AUTO DIFF WBC: CPT

## 2023-07-01 PROCEDURE — 86900 BLOOD TYPING SEROLOGIC ABO: CPT

## 2023-07-01 PROCEDURE — 85610 PROTHROMBIN TIME: CPT

## 2023-07-01 PROCEDURE — 84484 ASSAY OF TROPONIN QUANT: CPT

## 2023-07-01 PROCEDURE — 86850 RBC ANTIBODY SCREEN: CPT

## 2023-07-01 RX ORDER — SODIUM CHLORIDE 9 MG/ML
INJECTION, SOLUTION INTRAVENOUS PRN
Status: DISCONTINUED | OUTPATIENT
Start: 2023-07-01 | End: 2023-07-07 | Stop reason: HOSPADM

## 2023-07-01 RX ADMIN — SODIUM CHLORIDE 8 MG/HR: 9 INJECTION, SOLUTION INTRAVENOUS at 22:03

## 2023-07-01 RX ADMIN — OCTREOTIDE ACETATE 25 MCG/HR: 500 INJECTION, SOLUTION INTRAVENOUS; SUBCUTANEOUS at 23:07

## 2023-07-01 RX ADMIN — IOPAMIDOL 75 ML: 755 INJECTION, SOLUTION INTRAVENOUS at 22:35

## 2023-07-01 RX ADMIN — SODIUM CHLORIDE 80 MG: 9 INJECTION INTRAMUSCULAR; INTRAVENOUS; SUBCUTANEOUS at 21:53

## 2023-07-01 ASSESSMENT — PAIN - FUNCTIONAL ASSESSMENT: PAIN_FUNCTIONAL_ASSESSMENT: 0-10

## 2023-07-01 ASSESSMENT — PAIN DESCRIPTION - ORIENTATION: ORIENTATION: MID

## 2023-07-01 ASSESSMENT — PAIN DESCRIPTION - LOCATION: LOCATION: ABDOMEN

## 2023-07-01 ASSESSMENT — PAIN SCALES - GENERAL: PAINLEVEL_OUTOF10: 8

## 2023-07-01 ASSESSMENT — PAIN DESCRIPTION - DESCRIPTORS: DESCRIPTORS: ACHING

## 2023-07-02 ENCOUNTER — ANESTHESIA EVENT (OUTPATIENT)
Dept: ENDOSCOPY | Age: 68
End: 2023-07-02
Payer: MEDICARE

## 2023-07-02 ENCOUNTER — ANESTHESIA (OUTPATIENT)
Dept: ENDOSCOPY | Age: 68
End: 2023-07-02
Payer: MEDICARE

## 2023-07-02 LAB
ANION GAP SERPL CALCULATED.3IONS-SCNC: 12 MMOL/L (ref 3–16)
BASOPHILS # BLD: 0 K/UL (ref 0–0.2)
BASOPHILS NFR BLD: 0.6 %
BLOOD BANK DISPENSE STATUS: NORMAL
BLOOD BANK PRODUCT CODE: NORMAL
BPU ID: NORMAL
BUN SERPL-MCNC: 21 MG/DL (ref 7–20)
CALCIUM SERPL-MCNC: 7.9 MG/DL (ref 8.3–10.6)
CHLORIDE SERPL-SCNC: 103 MMOL/L (ref 99–110)
CHOLEST SERPL-MCNC: 54 MG/DL (ref 0–199)
CO2 SERPL-SCNC: 22 MMOL/L (ref 21–32)
CREAT SERPL-MCNC: 0.5 MG/DL (ref 0.6–1.2)
DEPRECATED RDW RBC AUTO: 22.7 % (ref 12.4–15.4)
DEPRECATED RDW RBC AUTO: 24.3 % (ref 12.4–15.4)
DESCRIPTION BLOOD BANK: NORMAL
EOSINOPHIL # BLD: 0 K/UL (ref 0–0.6)
EOSINOPHIL NFR BLD: 0.5 %
EST. AVERAGE GLUCOSE BLD GHB EST-MCNC: 96.8 MG/DL
GFR SERPLBLD CREATININE-BSD FMLA CKD-EPI: >60 ML/MIN/{1.73_M2}
GLUCOSE SERPL-MCNC: 102 MG/DL (ref 70–99)
HBA1C MFR BLD: 5 %
HCT VFR BLD AUTO: 20.4 % (ref 36–48)
HCT VFR BLD AUTO: 21 % (ref 36–48)
HCT VFR BLD AUTO: 30.5 % (ref 36–48)
HCT VFR BLD AUTO: 30.8 % (ref 36–48)
HDLC SERPL-MCNC: 25 MG/DL (ref 40–60)
HGB BLD-MCNC: 5.9 G/DL (ref 12–16)
HGB BLD-MCNC: 6.6 G/DL (ref 12–16)
HGB BLD-MCNC: 9.8 G/DL (ref 12–16)
HGB BLD-MCNC: 9.9 G/DL (ref 12–16)
IRON SATN MFR SERPL: 22 % (ref 15–50)
IRON SERPL-MCNC: 65 UG/DL (ref 37–145)
LDLC SERPL CALC-MCNC: 18 MG/DL
LYMPHOCYTES # BLD: 1.3 K/UL (ref 1–5.1)
LYMPHOCYTES NFR BLD: 19.3 %
MAGNESIUM SERPL-MCNC: 1.6 MG/DL (ref 1.8–2.4)
MCH RBC QN AUTO: 19.8 PG (ref 26–34)
MCH RBC QN AUTO: 22.1 PG (ref 26–34)
MCHC RBC AUTO-ENTMCNC: 29.1 G/DL (ref 31–36)
MCHC RBC AUTO-ENTMCNC: 31.4 G/DL (ref 31–36)
MCV RBC AUTO: 68.2 FL (ref 80–100)
MCV RBC AUTO: 70.3 FL (ref 80–100)
MONOCYTES # BLD: 0.5 K/UL (ref 0–1.3)
MONOCYTES NFR BLD: 8 %
NEUTROPHILS # BLD: 4.9 K/UL (ref 1.7–7.7)
NEUTROPHILS NFR BLD: 71.6 %
PATH INTERP BLD-IMP: NO
PHOSPHATE SERPL-MCNC: 4.2 MG/DL (ref 2.5–4.9)
PLATELET # BLD AUTO: 112 K/UL (ref 135–450)
PLATELET # BLD AUTO: 156 K/UL (ref 135–450)
PMV BLD AUTO: 9.1 FL (ref 5–10.5)
PMV BLD AUTO: 9.8 FL (ref 5–10.5)
POTASSIUM SERPL-SCNC: 4.7 MMOL/L (ref 3.5–5.1)
RBC # BLD AUTO: 2.98 M/UL (ref 4–5.2)
RBC # BLD AUTO: 2.99 M/UL (ref 4–5.2)
SODIUM SERPL-SCNC: 137 MMOL/L (ref 136–145)
TIBC SERPL-MCNC: 295 UG/DL (ref 260–445)
TRIGL SERPL-MCNC: 53 MG/DL (ref 0–150)
TROPONIN, HIGH SENSITIVITY: 23 NG/L (ref 0–14)
TROPONIN, HIGH SENSITIVITY: 24 NG/L (ref 0–14)
TROPONIN, HIGH SENSITIVITY: 24 NG/L (ref 0–14)
TROPONIN, HIGH SENSITIVITY: 25 NG/L (ref 0–14)
TROPONIN, HIGH SENSITIVITY: 26 NG/L (ref 0–14)
VLDLC SERPL CALC-MCNC: 11 MG/DL
WBC # BLD AUTO: 5 K/UL (ref 4–11)
WBC # BLD AUTO: 6.9 K/UL (ref 4–11)

## 2023-07-02 PROCEDURE — 80061 LIPID PANEL: CPT

## 2023-07-02 PROCEDURE — 30233K1 TRANSFUSION OF NONAUTOLOGOUS FROZEN PLASMA INTO PERIPHERAL VEIN, PERCUTANEOUS APPROACH: ICD-10-PCS | Performed by: STUDENT IN AN ORGANIZED HEALTH CARE EDUCATION/TRAINING PROGRAM

## 2023-07-02 PROCEDURE — C9113 INJ PANTOPRAZOLE SODIUM, VIA: HCPCS | Performed by: STUDENT IN AN ORGANIZED HEALTH CARE EDUCATION/TRAINING PROGRAM

## 2023-07-02 PROCEDURE — 6360000002 HC RX W HCPCS: Performed by: STUDENT IN AN ORGANIZED HEALTH CARE EDUCATION/TRAINING PROGRAM

## 2023-07-02 PROCEDURE — 85027 COMPLETE CBC AUTOMATED: CPT

## 2023-07-02 PROCEDURE — 3700000000 HC ANESTHESIA ATTENDED CARE: Performed by: INTERNAL MEDICINE

## 2023-07-02 PROCEDURE — 85018 HEMOGLOBIN: CPT

## 2023-07-02 PROCEDURE — 3609012300 HC EGD BAND LIGATION ESOPHGEAL/GASTRIC VARICES: Performed by: INTERNAL MEDICINE

## 2023-07-02 PROCEDURE — 83540 ASSAY OF IRON: CPT

## 2023-07-02 PROCEDURE — 6370000000 HC RX 637 (ALT 250 FOR IP): Performed by: NURSE PRACTITIONER

## 2023-07-02 PROCEDURE — 0W3P8ZZ CONTROL BLEEDING IN GASTROINTESTINAL TRACT, VIA NATURAL OR ARTIFICIAL OPENING ENDOSCOPIC: ICD-10-PCS | Performed by: INTERNAL MEDICINE

## 2023-07-02 PROCEDURE — 7100000000 HC PACU RECOVERY - FIRST 15 MIN: Performed by: INTERNAL MEDICINE

## 2023-07-02 PROCEDURE — 6360000002 HC RX W HCPCS: Performed by: ANESTHESIOLOGY

## 2023-07-02 PROCEDURE — 30233N1 TRANSFUSION OF NONAUTOLOGOUS RED BLOOD CELLS INTO PERIPHERAL VEIN, PERCUTANEOUS APPROACH: ICD-10-PCS | Performed by: STUDENT IN AN ORGANIZED HEALTH CARE EDUCATION/TRAINING PROGRAM

## 2023-07-02 PROCEDURE — 80048 BASIC METABOLIC PNL TOTAL CA: CPT

## 2023-07-02 PROCEDURE — 2580000003 HC RX 258: Performed by: ANESTHESIOLOGY

## 2023-07-02 PROCEDURE — 83036 HEMOGLOBIN GLYCOSYLATED A1C: CPT

## 2023-07-02 PROCEDURE — 84484 ASSAY OF TROPONIN QUANT: CPT

## 2023-07-02 PROCEDURE — 7100000001 HC PACU RECOVERY - ADDTL 15 MIN: Performed by: INTERNAL MEDICINE

## 2023-07-02 PROCEDURE — 6370000000 HC RX 637 (ALT 250 FOR IP): Performed by: INTERNAL MEDICINE

## 2023-07-02 PROCEDURE — 83550 IRON BINDING TEST: CPT

## 2023-07-02 PROCEDURE — 2580000003 HC RX 258: Performed by: INTERNAL MEDICINE

## 2023-07-02 PROCEDURE — 2580000003 HC RX 258: Performed by: STUDENT IN AN ORGANIZED HEALTH CARE EDUCATION/TRAINING PROGRAM

## 2023-07-02 PROCEDURE — 06L38CZ OCCLUSION OF ESOPHAGEAL VEIN WITH EXTRALUMINAL DEVICE, VIA NATURAL OR ARTIFICIAL OPENING ENDOSCOPIC: ICD-10-PCS | Performed by: INTERNAL MEDICINE

## 2023-07-02 PROCEDURE — 85014 HEMATOCRIT: CPT

## 2023-07-02 PROCEDURE — 2500000003 HC RX 250 WO HCPCS: Performed by: ANESTHESIOLOGY

## 2023-07-02 PROCEDURE — 6360000002 HC RX W HCPCS: Performed by: INTERNAL MEDICINE

## 2023-07-02 PROCEDURE — 83735 ASSAY OF MAGNESIUM: CPT

## 2023-07-02 PROCEDURE — 99222 1ST HOSP IP/OBS MODERATE 55: CPT | Performed by: INTERNAL MEDICINE

## 2023-07-02 PROCEDURE — 36430 TRANSFUSION BLD/BLD COMPNT: CPT

## 2023-07-02 PROCEDURE — 36415 COLL VENOUS BLD VENIPUNCTURE: CPT

## 2023-07-02 PROCEDURE — 2709999900 HC NON-CHARGEABLE SUPPLY: Performed by: INTERNAL MEDICINE

## 2023-07-02 PROCEDURE — 84100 ASSAY OF PHOSPHORUS: CPT

## 2023-07-02 PROCEDURE — 1200000000 HC SEMI PRIVATE

## 2023-07-02 PROCEDURE — 3700000001 HC ADD 15 MINUTES (ANESTHESIA): Performed by: INTERNAL MEDICINE

## 2023-07-02 RX ORDER — M-VIT,TX,IRON,MINS/CALC/FOLIC 27MG-0.4MG
1 TABLET ORAL DAILY
COMMUNITY

## 2023-07-02 RX ORDER — ACETAMINOPHEN 325 MG/1
650 TABLET ORAL EVERY 6 HOURS PRN
Status: DISCONTINUED | OUTPATIENT
Start: 2023-07-02 | End: 2023-07-07 | Stop reason: HOSPADM

## 2023-07-02 RX ORDER — SODIUM CHLORIDE 0.9 % (FLUSH) 0.9 %
5-40 SYRINGE (ML) INJECTION PRN
Status: DISCONTINUED | OUTPATIENT
Start: 2023-07-02 | End: 2023-07-07 | Stop reason: HOSPADM

## 2023-07-02 RX ORDER — DIPHENHYDRAMINE HCL 25 MG
25 TABLET ORAL NIGHTLY PRN
Status: DISCONTINUED | OUTPATIENT
Start: 2023-07-02 | End: 2023-07-07 | Stop reason: HOSPADM

## 2023-07-02 RX ORDER — FUROSEMIDE 10 MG/ML
40 INJECTION INTRAMUSCULAR; INTRAVENOUS 2 TIMES DAILY
Status: DISCONTINUED | OUTPATIENT
Start: 2023-07-02 | End: 2023-07-04

## 2023-07-02 RX ORDER — ONDANSETRON 4 MG/1
4 TABLET, ORALLY DISINTEGRATING ORAL EVERY 8 HOURS PRN
Status: DISCONTINUED | OUTPATIENT
Start: 2023-07-02 | End: 2023-07-07 | Stop reason: HOSPADM

## 2023-07-02 RX ORDER — SODIUM CHLORIDE, SODIUM LACTATE, POTASSIUM CHLORIDE, CALCIUM CHLORIDE 600; 310; 30; 20 MG/100ML; MG/100ML; MG/100ML; MG/100ML
INJECTION, SOLUTION INTRAVENOUS CONTINUOUS PRN
Status: DISCONTINUED | OUTPATIENT
Start: 2023-07-02 | End: 2023-07-02 | Stop reason: SDUPTHER

## 2023-07-02 RX ORDER — METOPROLOL SUCCINATE 25 MG/1
12.5 TABLET, EXTENDED RELEASE ORAL DAILY
Status: DISCONTINUED | OUTPATIENT
Start: 2023-07-02 | End: 2023-07-07

## 2023-07-02 RX ORDER — SODIUM CHLORIDE 9 MG/ML
INJECTION, SOLUTION INTRAVENOUS PRN
Status: DISCONTINUED | OUTPATIENT
Start: 2023-07-02 | End: 2023-07-07 | Stop reason: HOSPADM

## 2023-07-02 RX ORDER — ASPIRIN 81 MG/1
81 TABLET, CHEWABLE ORAL DAILY
Status: DISCONTINUED | OUTPATIENT
Start: 2023-07-02 | End: 2023-07-02

## 2023-07-02 RX ORDER — PROPOFOL 10 MG/ML
INJECTION, EMULSION INTRAVENOUS PRN
Status: DISCONTINUED | OUTPATIENT
Start: 2023-07-02 | End: 2023-07-02 | Stop reason: SDUPTHER

## 2023-07-02 RX ORDER — POLYETHYLENE GLYCOL 3350 17 G/17G
17 POWDER, FOR SOLUTION ORAL DAILY PRN
Status: DISCONTINUED | OUTPATIENT
Start: 2023-07-02 | End: 2023-07-07 | Stop reason: HOSPADM

## 2023-07-02 RX ORDER — SODIUM CHLORIDE, SODIUM LACTATE, POTASSIUM CHLORIDE, CALCIUM CHLORIDE 600; 310; 30; 20 MG/100ML; MG/100ML; MG/100ML; MG/100ML
INJECTION, SOLUTION INTRAVENOUS CONTINUOUS
Status: DISCONTINUED | OUTPATIENT
Start: 2023-07-02 | End: 2023-07-04

## 2023-07-02 RX ORDER — SODIUM CHLORIDE 0.9 % (FLUSH) 0.9 %
5-40 SYRINGE (ML) INJECTION EVERY 12 HOURS SCHEDULED
Status: DISCONTINUED | OUTPATIENT
Start: 2023-07-02 | End: 2023-07-07 | Stop reason: HOSPADM

## 2023-07-02 RX ORDER — MAGNESIUM SULFATE IN WATER 40 MG/ML
2000 INJECTION, SOLUTION INTRAVENOUS ONCE
Status: COMPLETED | OUTPATIENT
Start: 2023-07-02 | End: 2023-07-02

## 2023-07-02 RX ORDER — CALCIUM CARBONATE 500 MG/1
1 TABLET, CHEWABLE ORAL EVERY 4 HOURS PRN
COMMUNITY

## 2023-07-02 RX ORDER — SPIRONOLACTONE 25 MG/1
50 TABLET ORAL DAILY
Status: DISCONTINUED | OUTPATIENT
Start: 2023-07-02 | End: 2023-07-06

## 2023-07-02 RX ORDER — LACTULOSE 10 G/15ML
20 SOLUTION ORAL 3 TIMES DAILY
Status: DISCONTINUED | OUTPATIENT
Start: 2023-07-02 | End: 2023-07-07 | Stop reason: HOSPADM

## 2023-07-02 RX ORDER — ATORVASTATIN CALCIUM 40 MG/1
40 TABLET, FILM COATED ORAL NIGHTLY
Status: DISCONTINUED | OUTPATIENT
Start: 2023-07-02 | End: 2023-07-07 | Stop reason: HOSPADM

## 2023-07-02 RX ORDER — SODIUM CHLORIDE 9 MG/ML
INJECTION, SOLUTION INTRAVENOUS PRN
Status: DISCONTINUED | OUTPATIENT
Start: 2023-07-02 | End: 2023-07-04

## 2023-07-02 RX ORDER — LIDOCAINE HYDROCHLORIDE 20 MG/ML
INJECTION, SOLUTION EPIDURAL; INFILTRATION; INTRACAUDAL; PERINEURAL PRN
Status: DISCONTINUED | OUTPATIENT
Start: 2023-07-02 | End: 2023-07-02 | Stop reason: SDUPTHER

## 2023-07-02 RX ORDER — ACETAMINOPHEN 650 MG/1
650 SUPPOSITORY RECTAL EVERY 6 HOURS PRN
Status: DISCONTINUED | OUTPATIENT
Start: 2023-07-02 | End: 2023-07-07 | Stop reason: HOSPADM

## 2023-07-02 RX ORDER — ONDANSETRON 2 MG/ML
4 INJECTION INTRAMUSCULAR; INTRAVENOUS EVERY 6 HOURS PRN
Status: DISCONTINUED | OUTPATIENT
Start: 2023-07-02 | End: 2023-07-07 | Stop reason: HOSPADM

## 2023-07-02 RX ORDER — IBUPROFEN 200 MG
200 TABLET ORAL EVERY 8 HOURS PRN
Status: ON HOLD | COMMUNITY
End: 2023-07-06 | Stop reason: HOSPADM

## 2023-07-02 RX ADMIN — SODIUM CHLORIDE: 9 INJECTION, SOLUTION INTRAVENOUS at 02:49

## 2023-07-02 RX ADMIN — PHYTONADIONE 2 MG: 10 INJECTION, EMULSION INTRAMUSCULAR; INTRAVENOUS; SUBCUTANEOUS at 09:59

## 2023-07-02 RX ADMIN — FUROSEMIDE 40 MG: 10 INJECTION, SOLUTION INTRAMUSCULAR; INTRAVENOUS at 17:22

## 2023-07-02 RX ADMIN — PROPOFOL 25 MG: 10 INJECTION, EMULSION INTRAVENOUS at 11:41

## 2023-07-02 RX ADMIN — ATORVASTATIN CALCIUM 40 MG: 40 TABLET, FILM COATED ORAL at 20:30

## 2023-07-02 RX ADMIN — DIPHENHYDRAMINE HCL 25 MG: 25 TABLET ORAL at 22:01

## 2023-07-02 RX ADMIN — PROPOFOL 25 MG: 10 INJECTION, EMULSION INTRAVENOUS at 11:50

## 2023-07-02 RX ADMIN — LIDOCAINE HYDROCHLORIDE 80 MG: 20 INJECTION, SOLUTION EPIDURAL; INFILTRATION; INTRACAUDAL; PERINEURAL at 11:37

## 2023-07-02 RX ADMIN — PROPOFOL 25 MG: 10 INJECTION, EMULSION INTRAVENOUS at 11:37

## 2023-07-02 RX ADMIN — PROPOFOL 25 MG: 10 INJECTION, EMULSION INTRAVENOUS at 11:48

## 2023-07-02 RX ADMIN — OCTREOTIDE ACETATE 50 MCG/HR: 500 INJECTION, SOLUTION INTRAVENOUS; SUBCUTANEOUS at 13:43

## 2023-07-02 RX ADMIN — SODIUM CHLORIDE: 9 INJECTION, SOLUTION INTRAVENOUS at 05:39

## 2023-07-02 RX ADMIN — PHENOL 1 SPRAY: 1.5 LIQUID ORAL at 22:01

## 2023-07-02 RX ADMIN — PROPOFOL 25 MG: 10 INJECTION, EMULSION INTRAVENOUS at 11:39

## 2023-07-02 RX ADMIN — SODIUM CHLORIDE, POTASSIUM CHLORIDE, SODIUM LACTATE AND CALCIUM CHLORIDE: 600; 310; 30; 20 INJECTION, SOLUTION INTRAVENOUS at 01:38

## 2023-07-02 RX ADMIN — CEFTRIAXONE 1000 MG: 1 INJECTION, POWDER, FOR SOLUTION INTRAMUSCULAR; INTRAVENOUS at 01:00

## 2023-07-02 RX ADMIN — SODIUM CHLORIDE: 9 INJECTION, SOLUTION INTRAVENOUS at 00:59

## 2023-07-02 RX ADMIN — SODIUM CHLORIDE 8 MG/HR: 9 INJECTION, SOLUTION INTRAVENOUS at 06:06

## 2023-07-02 RX ADMIN — ACETAMINOPHEN 650 MG: 325 TABLET ORAL at 22:01

## 2023-07-02 RX ADMIN — SODIUM CHLORIDE, SODIUM LACTATE, POTASSIUM CHLORIDE, AND CALCIUM CHLORIDE: .6; .31; .03; .02 INJECTION, SOLUTION INTRAVENOUS at 11:33

## 2023-07-02 RX ADMIN — PROPOFOL 25 MG: 10 INJECTION, EMULSION INTRAVENOUS at 11:43

## 2023-07-02 RX ADMIN — LACTULOSE 20 G: 20 SOLUTION ORAL at 20:30

## 2023-07-02 RX ADMIN — MAGNESIUM SULFATE HEPTAHYDRATE 2000 MG: 40 INJECTION, SOLUTION INTRAVENOUS at 13:42

## 2023-07-02 ASSESSMENT — LIFESTYLE VARIABLES: SMOKING_STATUS: 1

## 2023-07-02 ASSESSMENT — PAIN DESCRIPTION - LOCATION: LOCATION: HEAD

## 2023-07-02 ASSESSMENT — PAIN DESCRIPTION - DESCRIPTORS: DESCRIPTORS: ACHING

## 2023-07-02 ASSESSMENT — ENCOUNTER SYMPTOMS: SHORTNESS OF BREATH: 0

## 2023-07-02 ASSESSMENT — PAIN SCALES - GENERAL
PAINLEVEL_OUTOF10: 3
PAINLEVEL_OUTOF10: 0

## 2023-07-03 ENCOUNTER — APPOINTMENT (OUTPATIENT)
Dept: ULTRASOUND IMAGING | Age: 68
End: 2023-07-03
Payer: MEDICARE

## 2023-07-03 LAB
ANION GAP SERPL CALCULATED.3IONS-SCNC: 5 MMOL/L (ref 3–16)
BUN SERPL-MCNC: 17 MG/DL (ref 7–20)
CALCIUM SERPL-MCNC: 7.8 MG/DL (ref 8.3–10.6)
CHLORIDE SERPL-SCNC: 100 MMOL/L (ref 99–110)
CO2 SERPL-SCNC: 28 MMOL/L (ref 21–32)
CREAT SERPL-MCNC: 0.6 MG/DL (ref 0.6–1.2)
DEPRECATED RDW RBC AUTO: 24.9 % (ref 12.4–15.4)
GFR SERPLBLD CREATININE-BSD FMLA CKD-EPI: >60 ML/MIN/{1.73_M2}
GLUCOSE SERPL-MCNC: 120 MG/DL (ref 70–99)
HCT VFR BLD AUTO: 23.9 % (ref 36–48)
HCT VFR BLD AUTO: 26.2 % (ref 36–48)
HCT VFR BLD AUTO: 28.9 % (ref 36–48)
HGB BLD-MCNC: 7.8 G/DL (ref 12–16)
HGB BLD-MCNC: 8.5 G/DL (ref 12–16)
HGB BLD-MCNC: 9.3 G/DL (ref 12–16)
MAGNESIUM SERPL-MCNC: 1.8 MG/DL (ref 1.8–2.4)
MCH RBC QN AUTO: 24.1 PG (ref 26–34)
MCHC RBC AUTO-ENTMCNC: 32.7 G/DL (ref 31–36)
MCV RBC AUTO: 73.7 FL (ref 80–100)
PHOSPHATE SERPL-MCNC: 3 MG/DL (ref 2.5–4.9)
PLATELET # BLD AUTO: 78 K/UL (ref 135–450)
PMV BLD AUTO: 8.6 FL (ref 5–10.5)
POTASSIUM SERPL-SCNC: 3.7 MMOL/L (ref 3.5–5.1)
RBC # BLD AUTO: 3.24 M/UL (ref 4–5.2)
SODIUM SERPL-SCNC: 133 MMOL/L (ref 136–145)
TROPONIN, HIGH SENSITIVITY: 25 NG/L (ref 0–14)
TROPONIN, HIGH SENSITIVITY: 26 NG/L (ref 0–14)
TROPONIN, HIGH SENSITIVITY: 27 NG/L (ref 0–14)
TROPONIN, HIGH SENSITIVITY: 28 NG/L (ref 0–14)
WBC # BLD AUTO: 2.7 K/UL (ref 4–11)

## 2023-07-03 PROCEDURE — 80048 BASIC METABOLIC PNL TOTAL CA: CPT

## 2023-07-03 PROCEDURE — 1200000000 HC SEMI PRIVATE

## 2023-07-03 PROCEDURE — 2580000003 HC RX 258: Performed by: INTERNAL MEDICINE

## 2023-07-03 PROCEDURE — 85027 COMPLETE CBC AUTOMATED: CPT

## 2023-07-03 PROCEDURE — 85018 HEMOGLOBIN: CPT

## 2023-07-03 PROCEDURE — C9113 INJ PANTOPRAZOLE SODIUM, VIA: HCPCS | Performed by: INTERNAL MEDICINE

## 2023-07-03 PROCEDURE — 6360000002 HC RX W HCPCS: Performed by: INTERNAL MEDICINE

## 2023-07-03 PROCEDURE — 36415 COLL VENOUS BLD VENIPUNCTURE: CPT

## 2023-07-03 PROCEDURE — 6370000000 HC RX 637 (ALT 250 FOR IP): Performed by: INTERNAL MEDICINE

## 2023-07-03 PROCEDURE — 84484 ASSAY OF TROPONIN QUANT: CPT

## 2023-07-03 PROCEDURE — 6360000002 HC RX W HCPCS: Performed by: NURSE PRACTITIONER

## 2023-07-03 PROCEDURE — 87338 HPYLORI STOOL AG IA: CPT

## 2023-07-03 PROCEDURE — 83735 ASSAY OF MAGNESIUM: CPT

## 2023-07-03 PROCEDURE — 84100 ASSAY OF PHOSPHORUS: CPT

## 2023-07-03 PROCEDURE — 76705 ECHO EXAM OF ABDOMEN: CPT

## 2023-07-03 PROCEDURE — C9113 INJ PANTOPRAZOLE SODIUM, VIA: HCPCS | Performed by: NURSE PRACTITIONER

## 2023-07-03 PROCEDURE — 85014 HEMATOCRIT: CPT

## 2023-07-03 PROCEDURE — 6370000000 HC RX 637 (ALT 250 FOR IP): Performed by: NURSE PRACTITIONER

## 2023-07-03 RX ORDER — PANTOPRAZOLE SODIUM 40 MG/10ML
40 INJECTION, POWDER, LYOPHILIZED, FOR SOLUTION INTRAVENOUS ONCE
Status: COMPLETED | OUTPATIENT
Start: 2023-07-03 | End: 2023-07-03

## 2023-07-03 RX ORDER — MIDODRINE HYDROCHLORIDE 10 MG/1
10 TABLET ORAL ONCE
Status: COMPLETED | OUTPATIENT
Start: 2023-07-04 | End: 2023-07-04

## 2023-07-03 RX ADMIN — CEFTRIAXONE 1000 MG: 1 INJECTION, POWDER, FOR SOLUTION INTRAMUSCULAR; INTRAVENOUS at 00:29

## 2023-07-03 RX ADMIN — SODIUM CHLORIDE 8 MG/HR: 9 INJECTION, SOLUTION INTRAVENOUS at 04:47

## 2023-07-03 RX ADMIN — SODIUM CHLORIDE 8 MG/HR: 9 INJECTION, SOLUTION INTRAVENOUS at 13:23

## 2023-07-03 RX ADMIN — PHENOL 1 SPRAY: 1.5 LIQUID ORAL at 09:51

## 2023-07-03 RX ADMIN — METOPROLOL SUCCINATE 12.5 MG: 25 TABLET, EXTENDED RELEASE ORAL at 09:52

## 2023-07-03 RX ADMIN — ACETAMINOPHEN 650 MG: 325 TABLET ORAL at 04:45

## 2023-07-03 RX ADMIN — SPIRONOLACTONE 50 MG: 25 TABLET ORAL at 09:52

## 2023-07-03 RX ADMIN — SODIUM CHLORIDE, POTASSIUM CHLORIDE, SODIUM LACTATE AND CALCIUM CHLORIDE: 600; 310; 30; 20 INJECTION, SOLUTION INTRAVENOUS at 23:38

## 2023-07-03 RX ADMIN — SODIUM CHLORIDE, PRESERVATIVE FREE 10 ML: 5 INJECTION INTRAVENOUS at 09:53

## 2023-07-03 RX ADMIN — CEFTRIAXONE 1000 MG: 1 INJECTION, POWDER, FOR SOLUTION INTRAMUSCULAR; INTRAVENOUS at 23:41

## 2023-07-03 RX ADMIN — OCTREOTIDE ACETATE 50 MCG/HR: 500 INJECTION, SOLUTION INTRAVENOUS; SUBCUTANEOUS at 00:30

## 2023-07-03 RX ADMIN — DIPHENHYDRAMINE HCL 25 MG: 25 TABLET ORAL at 23:51

## 2023-07-03 RX ADMIN — ATORVASTATIN CALCIUM 40 MG: 40 TABLET, FILM COATED ORAL at 21:22

## 2023-07-03 RX ADMIN — FUROSEMIDE 40 MG: 10 INJECTION, SOLUTION INTRAMUSCULAR; INTRAVENOUS at 09:53

## 2023-07-03 RX ADMIN — SODIUM CHLORIDE, PRESERVATIVE FREE 10 ML: 5 INJECTION INTRAVENOUS at 21:24

## 2023-07-03 RX ADMIN — PANTOPRAZOLE SODIUM 40 MG: 40 INJECTION, POWDER, FOR SOLUTION INTRAVENOUS at 21:22

## 2023-07-03 RX ADMIN — LACTULOSE 20 G: 20 SOLUTION ORAL at 09:52

## 2023-07-03 RX ADMIN — ACETAMINOPHEN 650 MG: 325 TABLET ORAL at 21:22

## 2023-07-03 RX ADMIN — ACETAMINOPHEN 650 MG: 325 TABLET ORAL at 14:54

## 2023-07-03 ASSESSMENT — PAIN SCALES - GENERAL
PAINLEVEL_OUTOF10: 3
PAINLEVEL_OUTOF10: 0
PAINLEVEL_OUTOF10: 3
PAINLEVEL_OUTOF10: 3
PAINLEVEL_OUTOF10: 0
PAINLEVEL_OUTOF10: 0
PAINLEVEL_OUTOF10: 3
PAINLEVEL_OUTOF10: 3
PAINLEVEL_OUTOF10: 4
PAINLEVEL_OUTOF10: 2
PAINLEVEL_OUTOF10: 3
PAINLEVEL_OUTOF10: 3

## 2023-07-03 ASSESSMENT — PAIN SCALES - WONG BAKER
WONGBAKER_NUMERICALRESPONSE: 2
WONGBAKER_NUMERICALRESPONSE: 0
WONGBAKER_NUMERICALRESPONSE: 0
WONGBAKER_NUMERICALRESPONSE: 2

## 2023-07-03 ASSESSMENT — PAIN DESCRIPTION - LOCATION
LOCATION: THROAT
LOCATION: OTHER (COMMENT)
LOCATION: THROAT
LOCATION: THROAT

## 2023-07-03 ASSESSMENT — PAIN DESCRIPTION - DESCRIPTORS
DESCRIPTORS: SORE

## 2023-07-03 ASSESSMENT — PAIN - FUNCTIONAL ASSESSMENT
PAIN_FUNCTIONAL_ASSESSMENT: ACTIVITIES ARE NOT PREVENTED

## 2023-07-03 ASSESSMENT — PAIN DESCRIPTION - ORIENTATION
ORIENTATION: MID
ORIENTATION: MID

## 2023-07-03 NOTE — ACP (ADVANCE CARE PLANNING)
patient/agent/surrogate to review completed ACP document and update if needed with changes in condition, patient preferences or care setting    [x] This note routed to one or more involved healthcare providers    Electronically signed by Samira Cedillo RN on 7/3/2023 at 11:11 AM  Ph: [phone]  Fax: [phone]

## 2023-07-03 NOTE — CARE COORDINATION
Case Management Assessment  Initial Evaluation    Date/Time of Evaluation: 7/3/2023 11:02 AM  Assessment Completed by: Leslie Cervantes RN    If patient is discharged prior to next notation, then this note serves as note for discharge by case management. 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 (P) Yes    Chart Reviewed: Yes      History Provided by: (P) Patient  Patient Orientation: (P) Alert and Oriented, Person, Place, Situation, Self    Patient Cognition: (P) Alert    Hospitalization in the last 30 days (Readmission):  No    If yes, Readmission Assessment in CM Navigator will be completed.     Advance Directives:      Code Status: Full Code   Patient's Primary Decision Maker is: (P) Legal Next of Kin    Primary Decision MakerMaritzawaldemarjovita Knott - Child - [phone]    Discharge Planning:    Patient lives with: (P) Alone Type of Home: (P) Apartment  Primary Care Giver: (P) Self  Patient Support Systems include: (P) Children, Family Members   Current Financial resources: (P) Medicare  Current community resources: (P) None  Current services prior to admission: (P) None            Current DME:              Type of Home Care services:  (P) None    ADLS  Prior functional level: (P) Independent in ADLs/IADLs  Current functional level: (P) Independent in

## 2023-07-04 LAB
ANION GAP SERPL CALCULATED.3IONS-SCNC: 7 MMOL/L (ref 3–16)
BUN SERPL-MCNC: 10 MG/DL (ref 7–20)
CALCIUM SERPL-MCNC: 7.1 MG/DL (ref 8.3–10.6)
CHLORIDE SERPL-SCNC: 102 MMOL/L (ref 99–110)
CO2 SERPL-SCNC: 29 MMOL/L (ref 21–32)
CREAT SERPL-MCNC: <0.5 MG/DL (ref 0.6–1.2)
DEPRECATED RDW RBC AUTO: 25.6 % (ref 12.4–15.4)
EKG ATRIAL RATE: 91 BPM
EKG DIAGNOSIS: NORMAL
EKG P AXIS: 74 DEGREES
EKG P-R INTERVAL: 120 MS
EKG Q-T INTERVAL: 360 MS
EKG QRS DURATION: 82 MS
EKG QTC CALCULATION (BAZETT): 442 MS
EKG R AXIS: 76 DEGREES
EKG T AXIS: 77 DEGREES
EKG VENTRICULAR RATE: 91 BPM
GFR SERPLBLD CREATININE-BSD FMLA CKD-EPI: >60 ML/MIN/{1.73_M2}
GLUCOSE SERPL-MCNC: 108 MG/DL (ref 70–99)
HCT VFR BLD AUTO: 25.4 % (ref 36–48)
HCT VFR BLD AUTO: 25.5 % (ref 36–48)
HCT VFR BLD AUTO: 25.5 % (ref 36–48)
HGB BLD-MCNC: 8 G/DL (ref 12–16)
HGB BLD-MCNC: 8.1 G/DL (ref 12–16)
HGB BLD-MCNC: 8.2 G/DL (ref 12–16)
MAGNESIUM SERPL-MCNC: 1.3 MG/DL (ref 1.8–2.4)
MCH RBC QN AUTO: 24 PG (ref 26–34)
MCHC RBC AUTO-ENTMCNC: 32.5 G/DL (ref 31–36)
MCV RBC AUTO: 73.7 FL (ref 80–100)
PHOSPHATE SERPL-MCNC: 2.3 MG/DL (ref 2.5–4.9)
PLATELET # BLD AUTO: 70 K/UL (ref 135–450)
PLATELET BLD QL SMEAR: ABNORMAL
PMV BLD AUTO: 8.5 FL (ref 5–10.5)
POTASSIUM SERPL-SCNC: 3.6 MMOL/L (ref 3.5–5.1)
RBC # BLD AUTO: 3.44 M/UL (ref 4–5.2)
SLIDE REVIEW: ABNORMAL
SODIUM SERPL-SCNC: 138 MMOL/L (ref 136–145)
TROPONIN, HIGH SENSITIVITY: 18 NG/L (ref 0–14)
TROPONIN, HIGH SENSITIVITY: 20 NG/L (ref 0–14)
TROPONIN, HIGH SENSITIVITY: 21 NG/L (ref 0–14)
WBC # BLD AUTO: 2.1 K/UL (ref 4–11)

## 2023-07-04 PROCEDURE — 36415 COLL VENOUS BLD VENIPUNCTURE: CPT

## 2023-07-04 PROCEDURE — 84100 ASSAY OF PHOSPHORUS: CPT

## 2023-07-04 PROCEDURE — P9047 ALBUMIN (HUMAN), 25%, 50ML: HCPCS | Performed by: INTERNAL MEDICINE

## 2023-07-04 PROCEDURE — 6360000002 HC RX W HCPCS: Performed by: INTERNAL MEDICINE

## 2023-07-04 PROCEDURE — 6370000000 HC RX 637 (ALT 250 FOR IP): Performed by: NURSE PRACTITIONER

## 2023-07-04 PROCEDURE — 93010 ELECTROCARDIOGRAM REPORT: CPT | Performed by: INTERNAL MEDICINE

## 2023-07-04 PROCEDURE — 6370000000 HC RX 637 (ALT 250 FOR IP): Performed by: INTERNAL MEDICINE

## 2023-07-04 PROCEDURE — C9113 INJ PANTOPRAZOLE SODIUM, VIA: HCPCS | Performed by: INTERNAL MEDICINE

## 2023-07-04 PROCEDURE — 2580000003 HC RX 258: Performed by: INTERNAL MEDICINE

## 2023-07-04 PROCEDURE — 1200000000 HC SEMI PRIVATE

## 2023-07-04 PROCEDURE — 85014 HEMATOCRIT: CPT

## 2023-07-04 PROCEDURE — 80048 BASIC METABOLIC PNL TOTAL CA: CPT

## 2023-07-04 PROCEDURE — 97165 OT EVAL LOW COMPLEX 30 MIN: CPT

## 2023-07-04 PROCEDURE — 85027 COMPLETE CBC AUTOMATED: CPT

## 2023-07-04 PROCEDURE — 97161 PT EVAL LOW COMPLEX 20 MIN: CPT

## 2023-07-04 PROCEDURE — 84484 ASSAY OF TROPONIN QUANT: CPT

## 2023-07-04 PROCEDURE — 94760 N-INVAS EAR/PLS OXIMETRY 1: CPT

## 2023-07-04 PROCEDURE — 97535 SELF CARE MNGMENT TRAINING: CPT

## 2023-07-04 PROCEDURE — 85018 HEMOGLOBIN: CPT

## 2023-07-04 PROCEDURE — 97530 THERAPEUTIC ACTIVITIES: CPT

## 2023-07-04 PROCEDURE — 2500000003 HC RX 250 WO HCPCS: Performed by: INTERNAL MEDICINE

## 2023-07-04 PROCEDURE — 83735 ASSAY OF MAGNESIUM: CPT

## 2023-07-04 RX ORDER — MAGNESIUM SULFATE IN WATER 40 MG/ML
4000 INJECTION, SOLUTION INTRAVENOUS ONCE
Status: COMPLETED | OUTPATIENT
Start: 2023-07-04 | End: 2023-07-04

## 2023-07-04 RX ORDER — ALBUMIN (HUMAN) 12.5 G/50ML
25 SOLUTION INTRAVENOUS ONCE
Status: COMPLETED | OUTPATIENT
Start: 2023-07-04 | End: 2023-07-04

## 2023-07-04 RX ORDER — MIDODRINE HYDROCHLORIDE 5 MG/1
5 TABLET ORAL
Status: DISCONTINUED | OUTPATIENT
Start: 2023-07-04 | End: 2023-07-04

## 2023-07-04 RX ORDER — MIDODRINE HYDROCHLORIDE 10 MG/1
10 TABLET ORAL
Status: DISCONTINUED | OUTPATIENT
Start: 2023-07-04 | End: 2023-07-07 | Stop reason: HOSPADM

## 2023-07-04 RX ORDER — FUROSEMIDE 20 MG/1
20 TABLET ORAL DAILY
Status: DISCONTINUED | OUTPATIENT
Start: 2023-07-05 | End: 2023-07-06

## 2023-07-04 RX ORDER — MIDODRINE HYDROCHLORIDE 5 MG/1
5 TABLET ORAL ONCE
Status: DISCONTINUED | OUTPATIENT
Start: 2023-07-04 | End: 2023-07-04

## 2023-07-04 RX ORDER — 0.9 % SODIUM CHLORIDE 0.9 %
250 INTRAVENOUS SOLUTION INTRAVENOUS ONCE
Status: COMPLETED | OUTPATIENT
Start: 2023-07-04 | End: 2023-07-04

## 2023-07-04 RX ADMIN — SPIRONOLACTONE 50 MG: 25 TABLET ORAL at 08:26

## 2023-07-04 RX ADMIN — MAGNESIUM SULFATE HEPTAHYDRATE 4000 MG: 40 INJECTION, SOLUTION INTRAVENOUS at 09:53

## 2023-07-04 RX ADMIN — OCTREOTIDE ACETATE 50 MCG/HR: 500 INJECTION, SOLUTION INTRAVENOUS; SUBCUTANEOUS at 10:26

## 2023-07-04 RX ADMIN — ALBUMIN (HUMAN) 25 G: 0.25 INJECTION, SOLUTION INTRAVENOUS at 17:41

## 2023-07-04 RX ADMIN — MIDODRINE HYDROCHLORIDE 5 MG: 5 TABLET ORAL at 13:20

## 2023-07-04 RX ADMIN — SODIUM CHLORIDE 250 ML: 9 INJECTION, SOLUTION INTRAVENOUS at 17:41

## 2023-07-04 RX ADMIN — OCTREOTIDE ACETATE 50 MCG/HR: 500 INJECTION, SOLUTION INTRAVENOUS; SUBCUTANEOUS at 01:35

## 2023-07-04 RX ADMIN — CEFTRIAXONE 1000 MG: 1 INJECTION, POWDER, FOR SOLUTION INTRAMUSCULAR; INTRAVENOUS at 23:48

## 2023-07-04 RX ADMIN — Medication 40 MG: at 21:03

## 2023-07-04 RX ADMIN — SODIUM CHLORIDE, PRESERVATIVE FREE 10 ML: 5 INJECTION INTRAVENOUS at 21:08

## 2023-07-04 RX ADMIN — ACETAMINOPHEN 650 MG: 325 TABLET ORAL at 21:02

## 2023-07-04 RX ADMIN — METOPROLOL SUCCINATE 12.5 MG: 25 TABLET, EXTENDED RELEASE ORAL at 08:26

## 2023-07-04 RX ADMIN — ATORVASTATIN CALCIUM 40 MG: 40 TABLET, FILM COATED ORAL at 21:02

## 2023-07-04 RX ADMIN — POTASSIUM PHOSPHATE, MONOBASIC AND POTASSIUM PHOSPHATE, DIBASIC 30 MMOL: 224; 236 INJECTION, SOLUTION, CONCENTRATE INTRAVENOUS at 10:02

## 2023-07-04 RX ADMIN — MIDODRINE HYDROCHLORIDE 10 MG: 10 TABLET ORAL at 17:41

## 2023-07-04 RX ADMIN — DIPHENHYDRAMINE HCL 25 MG: 25 TABLET ORAL at 21:02

## 2023-07-04 RX ADMIN — OCTREOTIDE ACETATE 50 MCG/HR: 500 INJECTION, SOLUTION INTRAVENOUS; SUBCUTANEOUS at 23:46

## 2023-07-04 RX ADMIN — SODIUM CHLORIDE, PRESERVATIVE FREE 10 ML: 5 INJECTION INTRAVENOUS at 08:28

## 2023-07-04 RX ADMIN — ACETAMINOPHEN 650 MG: 325 TABLET ORAL at 13:20

## 2023-07-04 RX ADMIN — MIDODRINE HYDROCHLORIDE 10 MG: 10 TABLET ORAL at 00:17

## 2023-07-04 RX ADMIN — ACETAMINOPHEN 650 MG: 325 TABLET ORAL at 06:23

## 2023-07-04 ASSESSMENT — PAIN DESCRIPTION - ORIENTATION
ORIENTATION: MID
ORIENTATION: MID

## 2023-07-04 ASSESSMENT — PAIN DESCRIPTION - LOCATION
LOCATION: THROAT
LOCATION: HEAD;THROAT
LOCATION: HEAD;THROAT

## 2023-07-04 ASSESSMENT — PAIN - FUNCTIONAL ASSESSMENT
PAIN_FUNCTIONAL_ASSESSMENT: ACTIVITIES ARE NOT PREVENTED
PAIN_FUNCTIONAL_ASSESSMENT: ACTIVITIES ARE NOT PREVENTED

## 2023-07-04 ASSESSMENT — PAIN SCALES - WONG BAKER
WONGBAKER_NUMERICALRESPONSE: 2
WONGBAKER_NUMERICALRESPONSE: 2

## 2023-07-04 ASSESSMENT — PAIN DESCRIPTION - DESCRIPTORS
DESCRIPTORS: ACHING

## 2023-07-04 ASSESSMENT — PAIN SCALES - GENERAL
PAINLEVEL_OUTOF10: 6
PAINLEVEL_OUTOF10: 4
PAINLEVEL_OUTOF10: 8

## 2023-07-04 NOTE — PLAN OF CARE
Problem: Discharge Planning  Goal: Discharge to home or other facility with appropriate resources  Outcome: Progressing  Flowsheets (Taken 7/3/2023 2000)  Discharge to home or other facility with appropriate resources: Identify barriers to discharge with patient and caregiver     Problem: Pain  Goal: Verbalizes/displays adequate comfort level or baseline comfort level  Outcome: Progressing     Problem: Safety - Adult  Goal: Free from fall injury  Outcome: Progressing

## 2023-07-04 NOTE — PLAN OF CARE
Problem: Discharge Planning  Goal: Discharge to home or other facility with appropriate resources  7/4/2023 1303 by Sintia Muir RN  Outcome: Progressing     Problem: Pain  Goal: Verbalizes/displays adequate comfort level or baseline comfort level  7/4/2023 1303 by Sintia Muir RN  Outcome: Progressing     Problem: Safety - Adult  Goal: Free from fall injury  7/4/2023 1303 by Sintia Muir RN  Outcome: Progressing

## 2023-07-05 LAB
ALBUMIN SERPL-MCNC: 2.8 G/DL (ref 3.4–5)
ALBUMIN/GLOB SERPL: 1.4 {RATIO} (ref 1.1–2.2)
ALP SERPL-CCNC: 120 U/L (ref 40–129)
ALT SERPL-CCNC: 33 U/L (ref 10–40)
ANION GAP SERPL CALCULATED.3IONS-SCNC: 6 MMOL/L (ref 3–16)
AST SERPL-CCNC: 52 U/L (ref 15–37)
BASOPHILS # BLD: 0 K/UL (ref 0–0.2)
BASOPHILS NFR BLD: 0.7 %
BILIRUB SERPL-MCNC: 2.1 MG/DL (ref 0–1)
BUN SERPL-MCNC: 8 MG/DL (ref 7–20)
CALCIUM SERPL-MCNC: 7.3 MG/DL (ref 8.3–10.6)
CHLORIDE SERPL-SCNC: 108 MMOL/L (ref 99–110)
CO2 SERPL-SCNC: 26 MMOL/L (ref 21–32)
CREAT SERPL-MCNC: <0.5 MG/DL (ref 0.6–1.2)
DEPRECATED RDW RBC AUTO: 26.3 % (ref 12.4–15.4)
EOSINOPHIL # BLD: 0.2 K/UL (ref 0–0.6)
EOSINOPHIL NFR BLD: 5.7 %
GFR SERPLBLD CREATININE-BSD FMLA CKD-EPI: >60 ML/MIN/{1.73_M2}
GLUCOSE SERPL-MCNC: 102 MG/DL (ref 70–99)
HCT VFR BLD AUTO: 25.3 % (ref 36–48)
HCT VFR BLD AUTO: 27.9 % (ref 36–48)
HGB BLD-MCNC: 8.1 G/DL (ref 12–16)
HGB BLD-MCNC: 8.8 G/DL (ref 12–16)
LV EF: 58 %
LVEF MODALITY: NORMAL
LYMPHOCYTES # BLD: 0.5 K/UL (ref 1–5.1)
LYMPHOCYTES NFR BLD: 17.9 %
MAGNESIUM SERPL-MCNC: 2.2 MG/DL (ref 1.8–2.4)
MCH RBC QN AUTO: 24.2 PG (ref 26–34)
MCHC RBC AUTO-ENTMCNC: 32.1 G/DL (ref 31–36)
MCV RBC AUTO: 75.5 FL (ref 80–100)
MONOCYTES # BLD: 0.3 K/UL (ref 0–1.3)
MONOCYTES NFR BLD: 10.5 %
NEUTROPHILS # BLD: 1.9 K/UL (ref 1.7–7.7)
NEUTROPHILS NFR BLD: 65.2 %
PHOSPHATE SERPL-MCNC: 2.3 MG/DL (ref 2.5–4.9)
PLATELET # BLD AUTO: 68 K/UL (ref 135–450)
PMV BLD AUTO: 8.5 FL (ref 5–10.5)
POTASSIUM SERPL-SCNC: 4.8 MMOL/L (ref 3.5–5.1)
PROT SERPL-MCNC: 4.8 G/DL (ref 6.4–8.2)
RBC # BLD AUTO: 3.35 M/UL (ref 4–5.2)
SODIUM SERPL-SCNC: 140 MMOL/L (ref 136–145)
WBC # BLD AUTO: 2.9 K/UL (ref 4–11)

## 2023-07-05 PROCEDURE — C9113 INJ PANTOPRAZOLE SODIUM, VIA: HCPCS | Performed by: INTERNAL MEDICINE

## 2023-07-05 PROCEDURE — 1200000000 HC SEMI PRIVATE

## 2023-07-05 PROCEDURE — 6370000000 HC RX 637 (ALT 250 FOR IP): Performed by: INTERNAL MEDICINE

## 2023-07-05 PROCEDURE — 6360000002 HC RX W HCPCS: Performed by: INTERNAL MEDICINE

## 2023-07-05 PROCEDURE — 84100 ASSAY OF PHOSPHORUS: CPT

## 2023-07-05 PROCEDURE — 97535 SELF CARE MNGMENT TRAINING: CPT

## 2023-07-05 PROCEDURE — 36415 COLL VENOUS BLD VENIPUNCTURE: CPT

## 2023-07-05 PROCEDURE — 83735 ASSAY OF MAGNESIUM: CPT

## 2023-07-05 PROCEDURE — 85025 COMPLETE CBC W/AUTO DIFF WBC: CPT

## 2023-07-05 PROCEDURE — 93306 TTE W/DOPPLER COMPLETE: CPT

## 2023-07-05 PROCEDURE — 85014 HEMATOCRIT: CPT

## 2023-07-05 PROCEDURE — 97530 THERAPEUTIC ACTIVITIES: CPT

## 2023-07-05 PROCEDURE — 2580000003 HC RX 258: Performed by: INTERNAL MEDICINE

## 2023-07-05 PROCEDURE — 85018 HEMOGLOBIN: CPT

## 2023-07-05 PROCEDURE — 80053 COMPREHEN METABOLIC PANEL: CPT

## 2023-07-05 RX ORDER — PANTOPRAZOLE SODIUM 40 MG/1
40 TABLET, DELAYED RELEASE ORAL
Status: DISCONTINUED | OUTPATIENT
Start: 2023-07-05 | End: 2023-07-07 | Stop reason: HOSPADM

## 2023-07-05 RX ADMIN — CEFTRIAXONE 1000 MG: 1 INJECTION, POWDER, FOR SOLUTION INTRAMUSCULAR; INTRAVENOUS at 23:34

## 2023-07-05 RX ADMIN — SODIUM CHLORIDE: 9 INJECTION, SOLUTION INTRAVENOUS at 23:33

## 2023-07-05 RX ADMIN — Medication 40 MG: at 08:27

## 2023-07-05 RX ADMIN — MIDODRINE HYDROCHLORIDE 10 MG: 10 TABLET ORAL at 18:05

## 2023-07-05 RX ADMIN — SODIUM CHLORIDE, PRESERVATIVE FREE 10 ML: 5 INJECTION INTRAVENOUS at 08:28

## 2023-07-05 RX ADMIN — SODIUM CHLORIDE, PRESERVATIVE FREE 10 ML: 5 INJECTION INTRAVENOUS at 20:21

## 2023-07-05 RX ADMIN — ACETAMINOPHEN 650 MG: 325 TABLET ORAL at 08:30

## 2023-07-05 RX ADMIN — ACETAMINOPHEN 650 MG: 325 TABLET ORAL at 20:21

## 2023-07-05 RX ADMIN — ATORVASTATIN CALCIUM 40 MG: 40 TABLET, FILM COATED ORAL at 20:21

## 2023-07-05 RX ADMIN — SPIRONOLACTONE 50 MG: 25 TABLET ORAL at 08:27

## 2023-07-05 RX ADMIN — MIDODRINE HYDROCHLORIDE 10 MG: 10 TABLET ORAL at 08:27

## 2023-07-05 RX ADMIN — METOPROLOL SUCCINATE 12.5 MG: 25 TABLET, EXTENDED RELEASE ORAL at 08:27

## 2023-07-05 RX ADMIN — FUROSEMIDE 20 MG: 20 TABLET ORAL at 08:27

## 2023-07-05 RX ADMIN — MIDODRINE HYDROCHLORIDE 10 MG: 10 TABLET ORAL at 13:36

## 2023-07-05 RX ADMIN — PANTOPRAZOLE SODIUM 40 MG: 40 TABLET, DELAYED RELEASE ORAL at 18:40

## 2023-07-05 ASSESSMENT — PAIN DESCRIPTION - PAIN TYPE: TYPE: ACUTE PAIN

## 2023-07-05 ASSESSMENT — PAIN SCALES - GENERAL
PAINLEVEL_OUTOF10: 6
PAINLEVEL_OUTOF10: 0
PAINLEVEL_OUTOF10: 5

## 2023-07-05 ASSESSMENT — PAIN DESCRIPTION - LOCATION
LOCATION: HEAD
LOCATION: HEAD;THROAT

## 2023-07-05 ASSESSMENT — PAIN - FUNCTIONAL ASSESSMENT: PAIN_FUNCTIONAL_ASSESSMENT: ACTIVITIES ARE NOT PREVENTED

## 2023-07-05 ASSESSMENT — PAIN DESCRIPTION - DESCRIPTORS
DESCRIPTORS: ACHING;DISCOMFORT
DESCRIPTORS: ACHING

## 2023-07-05 ASSESSMENT — PAIN DESCRIPTION - ORIENTATION: ORIENTATION: MID

## 2023-07-05 NOTE — PLAN OF CARE
Problem: Discharge Planning  Goal: Discharge to home or other facility with appropriate resources  7/5/2023 1303 by Kimball Schlatter, RN  Outcome: Progressing     Problem: Pain  Goal: Verbalizes/displays adequate comfort level or baseline comfort level  7/5/2023 1303 by Kimball Schlatter, RN  Outcome: Progressing     Problem: Safety - Adult  Goal: Free from fall injury  7/5/2023 1303 by Kimball Schlatter, RN  Outcome: Progressing     Problem: ABCDS Injury Assessment  Goal: Absence of physical injury  7/5/2023 1303 by Kimball Schlatter, RN  Outcome: Progressing

## 2023-07-05 NOTE — PLAN OF CARE
Problem: Discharge Planning  Goal: Discharge to home or other facility with appropriate resources  7/5/2023 0105 by Colleen Salas RN  Outcome: Progressing     Problem: Pain  Goal: Verbalizes/displays adequate comfort level or baseline comfort level  7/5/2023 0105 by Colleen Salas RN  Outcome: Progressing     Problem: Safety - Adult  Goal: Free from fall injury  7/5/2023 0105 by Colleen Salas RN  Outcome: Progressing     Problem: ABCDS Injury Assessment  Goal: Absence of physical injury  Outcome: Progressing

## 2023-07-05 NOTE — CARE COORDINATION
07/05/23 1109   IMM Letter   IMM Letter given to Patient/Family/Significant other/Guardian/POA/by: Nila Gardiner RN   IMM Letter date given: 07/05/23   IMM Letter time given: 4987     Electronically signed by Nila Gardiner RN on 7/5/2023 at 11:09 AM  Ph: [phone]  Fax: [phone]

## 2023-07-06 LAB
ALBUMIN SERPL-MCNC: 3 G/DL (ref 3.4–5)
ALBUMIN SERPL-MCNC: 3.1 G/DL (ref 3.4–5)
ALBUMIN SERPL-MCNC: 3.2 G/DL (ref 3.4–5)
ALBUMIN/GLOB SERPL: 1.4 {RATIO} (ref 1.1–2.2)
ALP SERPL-CCNC: 135 U/L (ref 40–129)
ALT SERPL-CCNC: 32 U/L (ref 10–40)
ANION GAP SERPL CALCULATED.3IONS-SCNC: 4 MMOL/L (ref 3–16)
ANION GAP SERPL CALCULATED.3IONS-SCNC: 6 MMOL/L (ref 3–16)
ANION GAP SERPL CALCULATED.3IONS-SCNC: 7 MMOL/L (ref 3–16)
AST SERPL-CCNC: 51 U/L (ref 15–37)
BASOPHILS # BLD: 0 K/UL (ref 0–0.2)
BASOPHILS NFR BLD: 0.5 %
BILIRUB SERPL-MCNC: 1.8 MG/DL (ref 0–1)
BUN SERPL-MCNC: 11 MG/DL (ref 7–20)
BUN SERPL-MCNC: 15 MG/DL (ref 7–20)
BUN SERPL-MCNC: 9 MG/DL (ref 7–20)
CALCIUM SERPL-MCNC: 7.7 MG/DL (ref 8.3–10.6)
CALCIUM SERPL-MCNC: 8.2 MG/DL (ref 8.3–10.6)
CALCIUM SERPL-MCNC: 8.5 MG/DL (ref 8.3–10.6)
CHLORIDE SERPL-SCNC: 100 MMOL/L (ref 99–110)
CHLORIDE SERPL-SCNC: 100 MMOL/L (ref 99–110)
CHLORIDE SERPL-SCNC: 105 MMOL/L (ref 99–110)
CO2 SERPL-SCNC: 27 MMOL/L (ref 21–32)
CO2 SERPL-SCNC: 28 MMOL/L (ref 21–32)
CO2 SERPL-SCNC: 31 MMOL/L (ref 21–32)
CREAT SERPL-MCNC: 0.6 MG/DL (ref 0.6–1.2)
CREAT SERPL-MCNC: 0.6 MG/DL (ref 0.6–1.2)
CREAT SERPL-MCNC: 0.8 MG/DL (ref 0.6–1.2)
DEPRECATED RDW RBC AUTO: 27.1 % (ref 12.4–15.4)
EOSINOPHIL # BLD: 0.2 K/UL (ref 0–0.6)
EOSINOPHIL NFR BLD: 4.1 %
GFR SERPLBLD CREATININE-BSD FMLA CKD-EPI: >60 ML/MIN/{1.73_M2}
GLUCOSE SERPL-MCNC: 105 MG/DL (ref 70–99)
GLUCOSE SERPL-MCNC: 181 MG/DL (ref 70–99)
GLUCOSE SERPL-MCNC: 220 MG/DL (ref 70–99)
H PYLORI AG STL QL IA: NEGATIVE
HCT VFR BLD AUTO: 26.6 % (ref 36–48)
HGB BLD-MCNC: 8.4 G/DL (ref 12–16)
LYMPHOCYTES # BLD: 0.8 K/UL (ref 1–5.1)
LYMPHOCYTES NFR BLD: 20.2 %
MAGNESIUM SERPL-MCNC: 1.8 MG/DL (ref 1.8–2.4)
MCH RBC QN AUTO: 24.3 PG (ref 26–34)
MCHC RBC AUTO-ENTMCNC: 31.8 G/DL (ref 31–36)
MCV RBC AUTO: 76.4 FL (ref 80–100)
MONOCYTES # BLD: 0.4 K/UL (ref 0–1.3)
MONOCYTES NFR BLD: 11 %
NEUTROPHILS # BLD: 2.4 K/UL (ref 1.7–7.7)
NEUTROPHILS NFR BLD: 64.2 %
PHOSPHATE SERPL-MCNC: 1.9 MG/DL (ref 2.5–4.9)
PHOSPHATE SERPL-MCNC: 1.9 MG/DL (ref 2.5–4.9)
PHOSPHATE SERPL-MCNC: 2.4 MG/DL (ref 2.5–4.9)
PLATELET # BLD AUTO: 77 K/UL (ref 135–450)
PLATELET BLD QL SMEAR: ABNORMAL
PMV BLD AUTO: 8.5 FL (ref 5–10.5)
POTASSIUM SERPL-SCNC: 5.2 MMOL/L (ref 3.5–5.1)
POTASSIUM SERPL-SCNC: 5.2 MMOL/L (ref 3.5–5.1)
POTASSIUM SERPL-SCNC: 5.4 MMOL/L (ref 3.5–5.1)
POTASSIUM SERPL-SCNC: 5.4 MMOL/L (ref 3.5–5.1)
PROT SERPL-MCNC: 5.2 G/DL (ref 6.4–8.2)
RBC # BLD AUTO: 3.48 M/UL (ref 4–5.2)
SLIDE REVIEW: ABNORMAL
SODIUM SERPL-SCNC: 134 MMOL/L (ref 136–145)
SODIUM SERPL-SCNC: 137 MMOL/L (ref 136–145)
SODIUM SERPL-SCNC: 137 MMOL/L (ref 136–145)
WBC # BLD AUTO: 3.7 K/UL (ref 4–11)

## 2023-07-06 PROCEDURE — 2580000003 HC RX 258: Performed by: INTERNAL MEDICINE

## 2023-07-06 PROCEDURE — 84132 ASSAY OF SERUM POTASSIUM: CPT

## 2023-07-06 PROCEDURE — 6370000000 HC RX 637 (ALT 250 FOR IP): Performed by: INTERNAL MEDICINE

## 2023-07-06 PROCEDURE — 6360000002 HC RX W HCPCS: Performed by: INTERNAL MEDICINE

## 2023-07-06 PROCEDURE — 36415 COLL VENOUS BLD VENIPUNCTURE: CPT

## 2023-07-06 PROCEDURE — 94760 N-INVAS EAR/PLS OXIMETRY 1: CPT

## 2023-07-06 PROCEDURE — 1200000000 HC SEMI PRIVATE

## 2023-07-06 PROCEDURE — 97530 THERAPEUTIC ACTIVITIES: CPT

## 2023-07-06 PROCEDURE — 80053 COMPREHEN METABOLIC PANEL: CPT

## 2023-07-06 PROCEDURE — 85025 COMPLETE CBC W/AUTO DIFF WBC: CPT

## 2023-07-06 PROCEDURE — 97116 GAIT TRAINING THERAPY: CPT

## 2023-07-06 PROCEDURE — 84100 ASSAY OF PHOSPHORUS: CPT

## 2023-07-06 PROCEDURE — 2500000003 HC RX 250 WO HCPCS: Performed by: INTERNAL MEDICINE

## 2023-07-06 PROCEDURE — 83735 ASSAY OF MAGNESIUM: CPT

## 2023-07-06 RX ORDER — PANTOPRAZOLE SODIUM 40 MG/1
40 TABLET, DELAYED RELEASE ORAL
Qty: 60 TABLET | Refills: 3 | Status: SHIPPED | OUTPATIENT
Start: 2023-07-06

## 2023-07-06 RX ORDER — MIDODRINE HYDROCHLORIDE 10 MG/1
10 TABLET ORAL
Qty: 90 TABLET | Refills: 3 | Status: SHIPPED | OUTPATIENT
Start: 2023-07-06

## 2023-07-06 RX ORDER — FUROSEMIDE 20 MG/1
20 TABLET ORAL 2 TIMES DAILY
Status: DISCONTINUED | OUTPATIENT
Start: 2023-07-06 | End: 2023-07-07 | Stop reason: HOSPADM

## 2023-07-06 RX ORDER — LEVOFLOXACIN 250 MG/1
250 TABLET ORAL DAILY
Qty: 3 TABLET | Refills: 0 | Status: SHIPPED | OUTPATIENT
Start: 2023-07-06 | End: 2023-07-09

## 2023-07-06 RX ORDER — ONDANSETRON 4 MG/1
4 TABLET, ORALLY DISINTEGRATING ORAL EVERY 8 HOURS PRN
Qty: 20 TABLET | Refills: 0 | Status: SHIPPED | OUTPATIENT
Start: 2023-07-06

## 2023-07-06 RX ORDER — FUROSEMIDE 10 MG/ML
20 INJECTION INTRAMUSCULAR; INTRAVENOUS ONCE
Status: DISCONTINUED | OUTPATIENT
Start: 2023-07-06 | End: 2023-07-07 | Stop reason: ALTCHOICE

## 2023-07-06 RX ORDER — FUROSEMIDE 10 MG/ML
20 INJECTION INTRAMUSCULAR; INTRAVENOUS ONCE
Status: COMPLETED | OUTPATIENT
Start: 2023-07-06 | End: 2023-07-06

## 2023-07-06 RX ORDER — LEVOFLOXACIN 250 MG/1
250 TABLET ORAL DAILY
Status: DISCONTINUED | OUTPATIENT
Start: 2023-07-06 | End: 2023-07-07 | Stop reason: HOSPADM

## 2023-07-06 RX ORDER — FUROSEMIDE 20 MG/1
20 TABLET ORAL 2 TIMES DAILY
Qty: 60 TABLET | Refills: 3 | Status: SHIPPED | OUTPATIENT
Start: 2023-07-06

## 2023-07-06 RX ORDER — ATORVASTATIN CALCIUM 40 MG/1
40 TABLET, FILM COATED ORAL NIGHTLY
Qty: 30 TABLET | Refills: 3 | Status: SHIPPED | OUTPATIENT
Start: 2023-07-06

## 2023-07-06 RX ORDER — LACTULOSE 10 G/15ML
20 SOLUTION ORAL 3 TIMES DAILY
Qty: 946 ML | Refills: 1 | Status: SHIPPED | OUTPATIENT
Start: 2023-07-06

## 2023-07-06 RX ORDER — METOPROLOL SUCCINATE 25 MG/1
12.5 TABLET, EXTENDED RELEASE ORAL DAILY
Qty: 30 TABLET | Refills: 3 | Status: SHIPPED | OUTPATIENT
Start: 2023-07-07 | End: 2023-07-07 | Stop reason: HOSPADM

## 2023-07-06 RX ADMIN — SPIRONOLACTONE 50 MG: 25 TABLET ORAL at 07:44

## 2023-07-06 RX ADMIN — ATORVASTATIN CALCIUM 40 MG: 40 TABLET, FILM COATED ORAL at 19:54

## 2023-07-06 RX ADMIN — MIDODRINE HYDROCHLORIDE 10 MG: 10 TABLET ORAL at 11:53

## 2023-07-06 RX ADMIN — SODIUM ZIRCONIUM CYCLOSILICATE 5 G: 5 POWDER, FOR SUSPENSION ORAL at 15:09

## 2023-07-06 RX ADMIN — SODIUM ZIRCONIUM CYCLOSILICATE 5 G: 5 POWDER, FOR SUSPENSION ORAL at 19:54

## 2023-07-06 RX ADMIN — FUROSEMIDE 20 MG: 20 TABLET ORAL at 07:44

## 2023-07-06 RX ADMIN — ACETAMINOPHEN 650 MG: 325 TABLET ORAL at 07:43

## 2023-07-06 RX ADMIN — LEVOFLOXACIN 250 MG: 250 TABLET, FILM COATED ORAL at 14:08

## 2023-07-06 RX ADMIN — METOPROLOL SUCCINATE 12.5 MG: 25 TABLET, EXTENDED RELEASE ORAL at 07:44

## 2023-07-06 RX ADMIN — FUROSEMIDE 20 MG: 10 INJECTION, SOLUTION INTRAMUSCULAR; INTRAVENOUS at 15:09

## 2023-07-06 RX ADMIN — MIDODRINE HYDROCHLORIDE 10 MG: 10 TABLET ORAL at 16:45

## 2023-07-06 RX ADMIN — SODIUM BICARBONATE 50 MEQ: 84 INJECTION, SOLUTION INTRAVENOUS at 15:10

## 2023-07-06 RX ADMIN — PANTOPRAZOLE SODIUM 40 MG: 40 TABLET, DELAYED RELEASE ORAL at 15:09

## 2023-07-06 RX ADMIN — PANTOPRAZOLE SODIUM 40 MG: 40 TABLET, DELAYED RELEASE ORAL at 07:44

## 2023-07-06 RX ADMIN — FUROSEMIDE 20 MG: 20 TABLET ORAL at 16:45

## 2023-07-06 RX ADMIN — SODIUM CHLORIDE, PRESERVATIVE FREE 10 ML: 5 INJECTION INTRAVENOUS at 07:46

## 2023-07-06 RX ADMIN — ACETAMINOPHEN 650 MG: 325 TABLET ORAL at 15:26

## 2023-07-06 ASSESSMENT — PAIN DESCRIPTION - DESCRIPTORS
DESCRIPTORS: ACHING
DESCRIPTORS: ACHING

## 2023-07-06 ASSESSMENT — PAIN SCALES - GENERAL
PAINLEVEL_OUTOF10: 0
PAINLEVEL_OUTOF10: 3
PAINLEVEL_OUTOF10: 0
PAINLEVEL_OUTOF10: 0
PAINLEVEL_OUTOF10: 3

## 2023-07-06 ASSESSMENT — PAIN - FUNCTIONAL ASSESSMENT
PAIN_FUNCTIONAL_ASSESSMENT: PREVENTS OR INTERFERES SOME ACTIVE ACTIVITIES AND ADLS
PAIN_FUNCTIONAL_ASSESSMENT: PREVENTS OR INTERFERES SOME ACTIVE ACTIVITIES AND ADLS

## 2023-07-06 ASSESSMENT — PAIN DESCRIPTION - LOCATION
LOCATION: THROAT
LOCATION: THROAT

## 2023-07-06 ASSESSMENT — PAIN DESCRIPTION - ORIENTATION
ORIENTATION: MID
ORIENTATION: MID

## 2023-07-06 NOTE — PLAN OF CARE
Problem: Discharge Planning  Goal: Discharge to home or other facility with appropriate resources  7/6/2023 0244 by Ryan Sloan RN  Outcome: Progressing     Problem: Pain  Goal: Verbalizes/displays adequate comfort level or baseline comfort level  7/6/2023 0244 by Ryan Sloan RN  Outcome: Progressing     Problem: Safety - Adult  Goal: Free from fall injury  7/6/2023 0244 by Ryan Sloan RN  Outcome: Progressing     Problem: ABCDS Injury Assessment  Goal: Absence of physical injury  7/6/2023 0244 by Ryan Sloan RN  Outcome: Progressing

## 2023-07-06 NOTE — PLAN OF CARE
Problem: Discharge Planning  Goal: Discharge to home or other facility with appropriate resources  7/6/2023 0907 by Pat Narayanan RN  Outcome: Progressing     Problem: Pain  Goal: Verbalizes/displays adequate comfort level or baseline comfort level  7/6/2023 0907 by Pat Narayanan RN  Outcome: Progressing       Problem: Safety - Adult  Goal: Free from fall injury  7/6/2023 0907 by Pat Narayanan RN  Outcome: Progressing     Problem: ABCDS Injury Assessment  Goal: Absence of physical injury  7/6/2023 0907 by Pat Narayanan RN  Outcome: Progressing

## 2023-07-07 VITALS
BODY MASS INDEX: 13.32 KG/M2 | RESPIRATION RATE: 15 BRPM | HEIGHT: 63 IN | OXYGEN SATURATION: 94 % | TEMPERATURE: 98.3 F | DIASTOLIC BLOOD PRESSURE: 57 MMHG | WEIGHT: 75.18 LBS | HEART RATE: 46 BPM | SYSTOLIC BLOOD PRESSURE: 120 MMHG

## 2023-07-07 LAB
ALBUMIN SERPL-MCNC: 3 G/DL (ref 3.4–5)
ALBUMIN/GLOB SERPL: 1.4 {RATIO} (ref 1.1–2.2)
ALP SERPL-CCNC: 131 U/L (ref 40–129)
ALT SERPL-CCNC: 30 U/L (ref 10–40)
ANION GAP SERPL CALCULATED.3IONS-SCNC: 7 MMOL/L (ref 3–16)
ANISOCYTOSIS BLD QL SMEAR: ABNORMAL
AST SERPL-CCNC: 39 U/L (ref 15–37)
BASOPHILS # BLD: 0 K/UL (ref 0–0.2)
BASOPHILS NFR BLD: 0.5 %
BILIRUB SERPL-MCNC: 2.2 MG/DL (ref 0–1)
BUN SERPL-MCNC: 16 MG/DL (ref 7–20)
CALCIUM SERPL-MCNC: 8.4 MG/DL (ref 8.3–10.6)
CHLORIDE SERPL-SCNC: 102 MMOL/L (ref 99–110)
CO2 SERPL-SCNC: 28 MMOL/L (ref 21–32)
CREAT SERPL-MCNC: 0.7 MG/DL (ref 0.6–1.2)
DACRYOCYTES BLD QL SMEAR: ABNORMAL
DEPRECATED RDW RBC AUTO: 28.8 % (ref 12.4–15.4)
EOSINOPHIL # BLD: 0.1 K/UL (ref 0–0.6)
EOSINOPHIL NFR BLD: 3.9 %
GFR SERPLBLD CREATININE-BSD FMLA CKD-EPI: >60 ML/MIN/{1.73_M2}
GLUCOSE SERPL-MCNC: 165 MG/DL (ref 70–99)
HCT VFR BLD AUTO: 28.8 % (ref 36–48)
HGB BLD-MCNC: 9.4 G/DL (ref 12–16)
HYPOCHROMIA BLD QL SMEAR: ABNORMAL
LYMPHOCYTES # BLD: 0.6 K/UL (ref 1–5.1)
LYMPHOCYTES NFR BLD: 20.9 %
MAGNESIUM SERPL-MCNC: 1.5 MG/DL (ref 1.8–2.4)
MCH RBC QN AUTO: 24.5 PG (ref 26–34)
MCHC RBC AUTO-ENTMCNC: 32.6 G/DL (ref 31–36)
MCV RBC AUTO: 75.3 FL (ref 80–100)
MONOCYTES # BLD: 0.2 K/UL (ref 0–1.3)
MONOCYTES NFR BLD: 7.9 %
NEUTROPHILS # BLD: 1.9 K/UL (ref 1.7–7.7)
NEUTROPHILS NFR BLD: 66.8 %
OVALOCYTES BLD QL SMEAR: ABNORMAL
PHOSPHATE SERPL-MCNC: 2.1 MG/DL (ref 2.5–4.9)
PLATELET # BLD AUTO: 80 K/UL (ref 135–450)
PLATELET BLD QL SMEAR: ABNORMAL
PMV BLD AUTO: 8.6 FL (ref 5–10.5)
POIKILOCYTOSIS BLD QL SMEAR: ABNORMAL
POLYCHROMASIA BLD QL SMEAR: ABNORMAL
POTASSIUM SERPL-SCNC: 4.9 MMOL/L (ref 3.5–5.1)
PROT SERPL-MCNC: 5.1 G/DL (ref 6.4–8.2)
RBC # BLD AUTO: 3.83 M/UL (ref 4–5.2)
SLIDE REVIEW: ABNORMAL
SODIUM SERPL-SCNC: 137 MMOL/L (ref 136–145)
TARGETS BLD QL SMEAR: ABNORMAL
WBC # BLD AUTO: 2.8 K/UL (ref 4–11)

## 2023-07-07 PROCEDURE — 80053 COMPREHEN METABOLIC PANEL: CPT

## 2023-07-07 PROCEDURE — 94760 N-INVAS EAR/PLS OXIMETRY 1: CPT

## 2023-07-07 PROCEDURE — 84100 ASSAY OF PHOSPHORUS: CPT

## 2023-07-07 PROCEDURE — 6370000000 HC RX 637 (ALT 250 FOR IP): Performed by: INTERNAL MEDICINE

## 2023-07-07 PROCEDURE — 36415 COLL VENOUS BLD VENIPUNCTURE: CPT

## 2023-07-07 PROCEDURE — 83735 ASSAY OF MAGNESIUM: CPT

## 2023-07-07 PROCEDURE — 97535 SELF CARE MNGMENT TRAINING: CPT

## 2023-07-07 PROCEDURE — 6370000000 HC RX 637 (ALT 250 FOR IP): Performed by: NURSE PRACTITIONER

## 2023-07-07 PROCEDURE — 85025 COMPLETE CBC W/AUTO DIFF WBC: CPT

## 2023-07-07 RX ORDER — LANOLIN ALCOHOL/MO/W.PET/CERES
800 CREAM (GRAM) TOPICAL DAILY
Status: DISCONTINUED | OUTPATIENT
Start: 2023-07-07 | End: 2023-07-07

## 2023-07-07 RX ORDER — FUROSEMIDE 20 MG/1
20 TABLET ORAL ONCE
Status: COMPLETED | OUTPATIENT
Start: 2023-07-07 | End: 2023-07-07

## 2023-07-07 RX ORDER — MAGNESIUM SULFATE IN WATER 40 MG/ML
4000 INJECTION, SOLUTION INTRAVENOUS ONCE
Status: DISCONTINUED | OUTPATIENT
Start: 2023-07-07 | End: 2023-07-07

## 2023-07-07 RX ORDER — LANOLIN ALCOHOL/MO/W.PET/CERES
800 CREAM (GRAM) TOPICAL ONCE
Status: COMPLETED | OUTPATIENT
Start: 2023-07-07 | End: 2023-07-07

## 2023-07-07 RX ORDER — MAGNESIUM OXIDE 400 MG/1
400 TABLET ORAL DAILY
Qty: 30 TABLET | Refills: 1 | Status: SHIPPED | OUTPATIENT
Start: 2023-07-07

## 2023-07-07 RX ADMIN — Medication 800 MG: at 11:47

## 2023-07-07 RX ADMIN — MIDODRINE HYDROCHLORIDE 10 MG: 10 TABLET ORAL at 11:46

## 2023-07-07 RX ADMIN — MIDODRINE HYDROCHLORIDE 10 MG: 10 TABLET ORAL at 07:52

## 2023-07-07 RX ADMIN — PANTOPRAZOLE SODIUM 40 MG: 40 TABLET, DELAYED RELEASE ORAL at 06:10

## 2023-07-07 RX ADMIN — FUROSEMIDE 20 MG: 20 TABLET ORAL at 00:49

## 2023-07-07 RX ADMIN — SODIUM ZIRCONIUM CYCLOSILICATE 5 G: 5 POWDER, FOR SUSPENSION ORAL at 10:34

## 2023-07-07 RX ADMIN — LEVOFLOXACIN 250 MG: 250 TABLET, FILM COATED ORAL at 07:52

## 2023-07-07 NOTE — PLAN OF CARE
Problem: Discharge Planning  Goal: Discharge to home or other facility with appropriate resources  7/7/2023 0719 by Aryan Garcia RN  Outcome: Progressing     Problem: Pain  Goal: Verbalizes/displays adequate comfort level or baseline comfort level  7/7/2023 0719 by Aryan Garcia RN  Outcome: Progressing     Problem: Safety - Adult  Goal: Free from fall injury  7/7/2023 0719 by Aryan Garcia RN  Outcome: Progressing     Problem: ABCDS Injury Assessment  Goal: Absence of physical injury  7/7/2023 0719 by Aryan Garcia RN  Outcome: Progressing     Problem: Nutrition Deficit:  Goal: Optimize nutritional status  7/7/2023 0719 by Aryan Garcia RN  Outcome: Progressing

## 2023-07-07 NOTE — DISCHARGE SUMMARY
CASE MANAGEMENT DISCHARGE SUMMARY:    DISCHARGE DATE: 07/07/23    DISCHARGED TO: Home     TRANSPORTATION: Brother to transport             TIME: TBD by patient and Bedside RN       PREFERRED PHARMACY: [facility] in [facility]. Comments: DME order for shower seat at discharge noted. Order called out to [address] [facility]. Bedside RN aware of DME needs and discharge time line.      Electronically signed by Viola Silva RN on 7/7/2023 at 12:22 PM
GM/15ML solution  levoFLOXacin 250 MG tablet  magnesium oxide 400 MG tablet  midodrine 10 MG tablet  ondansetron 4 MG disintegrating tablet  pantoprazole 40 MG tablet         Activity: activity as tolerated  Diet: ADULT ORAL NUTRITION SUPPLEMENT; Breakfast, Lunch, Dinner; Standard High Calorie/High Protein Oral Supplement  ADULT DIET; Dysphagia - Soft and Bite Sized; Low Sodium (2 gm); 1500 ml      Disposition: home  Discharged Condition: Stable  Follow Up:   Olinda Akins MD  [address] [place] [address] [address] [facility]  [phone]    Schedule an appointment as soon as possible for a visit in 1 week(s)      Elise Mejia, [address] [facility] [address]  [phone]    Schedule an appointment as soon as possible for a visit in 1 week(s)      Dorinda Pruitt, [address], NP  [address] [place] [address] 10  [phone]    Schedule an appointment as soon as possible for a visit in 1 week(s)        Code status:  Full Code     Total time spent on discharge, finalizing medications, referrals and arranging outpatient follow up was more than 30 minutes      Thank you Dr. Dorinda Pruitt, RN, NP for the opportunity to be involved in this patients care.

## 2023-07-07 NOTE — DISCHARGE INSTR - COC
Continuity of Care Form    Patient Name: Annabella Leyden   :  1955  MRN:  [id number]    Admit date:  2023  Discharge date:  ***    Code Status Order: Full Code   Advance Directives:   Advance Care Flowsheet Documentation       Date/Time Healthcare Directive Type of Healthcare Directive Copy in [address] Agent's Name Healthcare Agent's Phone Number    [phone] Yes, patient has an advance directive for healthcare treatment Living will No, copy requested from family -- -- --            Admitting Physician:  Maria D Del Toro MD  PCP: Aron Ahumada, RN, NP    Discharging Nurse: [facility] Unit/Room#: E8G-2964/1035-08  Discharging Unit Phone Number: ***    Emergency Contact:   Extended Emergency Contact Information  Primary Emergency Contact: [place] Phone: [phone]  Relation: Child    Past Surgical History:  Past Surgical History:   Procedure Laterality Date    COLONOSCOPY N/A 3/8/2019    COLONOSCOPY POLYPECTOMY HOT snare with clip placed performed by Julio Maldonado,  at [address] [place] [address] N/A 3/12/2019    ESOPHAGOGASTRODUODENOSCOPY WITH CAPSULE ENDOSCOPY DEPLOYMENT performed by Gi Parr MD at [address] [place] [address] 3/6/2019    EGD W/EUS FNA performed by Gi Parr MD at [address] [place] [address] 3/6/2019    EGD BIOPSY performed by Gi Parr MD at [address] [place] [address] 3/6/2019    EGD DILATION BALLOON performed by Gi Parr MD at [address] [place] [address]  2019    ESOPHAGOGASTRODUODENOSCOPY WITH CAPSULE ENDOSCOPY DEPLOYMENT    UPPER GASTROINTESTINAL ENDOSCOPY  2019    ESOPHAGOGASTRODUODENOSCOPY WITH CAPSULE ENDOSCOPY DEPLOYMENT    UPPER GASTROINTESTINAL ENDOSCOPY N/A 3/12/2019    EGD DILATION BALLOON performed by Gi Parr MD at [address] [place] [address]

## 2023-07-10 ENCOUNTER — TELEPHONE (OUTPATIENT)
Dept: CARDIOLOGY CLINIC | Age: 68
End: 2023-07-10

## 2023-07-10 NOTE — TELEPHONE ENCOUNTER
Shanna Noel called the office needing to schedule an appointment with Dr. Ko Reese. I spoke with Cayden Biggs and we offered her Wednesday, 7/12 at 11:45 a.m. Shanna Noel stated that she could not come in that day as her brother is her transportation. Shanna Noel states that she is available Tuesdays and Thursdays. Please assist with scheduling.     Jeffrey's callback: [phone]

## 2023-07-18 ENCOUNTER — OFFICE VISIT (OUTPATIENT)
Dept: CARDIOLOGY CLINIC | Age: 68
End: 2023-07-18
Payer: MEDICARE

## 2023-07-18 VITALS
HEIGHT: 63 IN | OXYGEN SATURATION: 97 % | SYSTOLIC BLOOD PRESSURE: 107 MMHG | BODY MASS INDEX: 12.76 KG/M2 | WEIGHT: 72 LBS | DIASTOLIC BLOOD PRESSURE: 56 MMHG | HEART RATE: 76 BPM

## 2023-07-18 DIAGNOSIS — K70.31 ALCOHOLIC CIRRHOSIS OF LIVER WITH ASCITES (HCC): ICD-10-CM

## 2023-07-18 DIAGNOSIS — I24.8 DEMAND ISCHEMIA (HCC): Primary | ICD-10-CM

## 2023-07-18 DIAGNOSIS — D50.0 IRON DEFICIENCY ANEMIA DUE TO CHRONIC BLOOD LOSS: ICD-10-CM

## 2023-07-18 PROCEDURE — G8427 DOCREV CUR MEDS BY ELIG CLIN: HCPCS | Performed by: INTERNAL MEDICINE

## 2023-07-18 PROCEDURE — 1123F ACP DISCUSS/DSCN MKR DOCD: CPT | Performed by: INTERNAL MEDICINE

## 2023-07-18 PROCEDURE — 1111F DSCHRG MED/CURRENT MED MERGE: CPT | Performed by: INTERNAL MEDICINE

## 2023-07-18 PROCEDURE — 4004F PT TOBACCO SCREEN RCVD TLK: CPT | Performed by: INTERNAL MEDICINE

## 2023-07-18 PROCEDURE — 3017F COLORECTAL CA SCREEN DOC REV: CPT | Performed by: INTERNAL MEDICINE

## 2023-07-18 PROCEDURE — 93000 ELECTROCARDIOGRAM COMPLETE: CPT | Performed by: INTERNAL MEDICINE

## 2023-07-18 PROCEDURE — 99214 OFFICE O/P EST MOD 30 MIN: CPT | Performed by: INTERNAL MEDICINE

## 2023-07-18 PROCEDURE — G8419 CALC BMI OUT NRM PARAM NOF/U: HCPCS | Performed by: INTERNAL MEDICINE

## 2023-07-18 PROCEDURE — 1090F PRES/ABSN URINE INCON ASSESS: CPT | Performed by: INTERNAL MEDICINE

## 2023-07-18 PROCEDURE — G8400 PT W/DXA NO RESULTS DOC: HCPCS | Performed by: INTERNAL MEDICINE

## 2023-07-18 RX ORDER — FERROUS SULFATE 325(65) MG
TABLET ORAL
COMMUNITY
Start: 2022-10-13

## 2023-07-18 NOTE — PROGRESS NOTES
JVP or carotid bruit appreciated. No mass and no thyromegaly present. No lymphadenopathy present. Cardiovascular: Normal rate. Normal heart sounds. Exam reveals no gallop and no friction rub. No murmur heard. Pulmonary/Chest: Effort normal and breath sounds normal. No respiratory distress. She has no wheezes, rhonchi or rales. Abdominal: Soft, non-tender. Bowel sounds are normal. She exhibits no organomegaly, mass or bruit. Extremities: No edema, cyanosis, or clubbing. Pulses are 2+ radial/dorsalis pedis/posterior tibial/carotid bilaterally. Neurological: No gross cranial nerve deficit. Coordination normal.   Skin: Skin is warm and dry. There is no rash or diaphoresis. Psychiatric: She has a normal mood and affect. Her speech is normal and behavior is normal.     Lab Review:    Lab Results   Component Value Date/Time    TRIG 53 07/02/2023 05:19 AM    HDL 25 07/02/2023 05:19 AM    LDLCALC 18 07/02/2023 05:19 AM    LABVLDL 11 07/02/2023 05:19 AM     Lab Results   Component Value Date/Time    BUN 16 07/07/2023 06:06 AM    CREATININE 0.7 07/07/2023 06:06 AM       The ASCVD Risk score (Milagros DK, et al., 2019) failed to calculate for the following reasons: The valid total cholesterol range is 130 to 320 mg/dL      EKG Interpretation: 7/18/23 Sinus  Rhythm  - occasional PAC   # PACs = 1. Voltage criteria for LVH  (S(V1)+R(V6) exceeds 3.50 mV). -Old anterior infarct. -Diffuse ST depression  -Seen with left ventricular hypertrophy (strain) or digitalis effect -consider subendocardial injury/ischemia. Image Review:     Echo 7/5/23  Normal left ventricle size, wall thickness, and systolic function with an   estimated ejection fraction of 55-60%. grade II diastolic dysfunction with elevated LV filling pressures. Mitral valve leaflets appear mildly thickened. The left atrium is severely dilated. The right ventricle is normal in size and function. Mild to moderate tricuspid regurgitation.    IVC size

## 2023-09-03 ENCOUNTER — APPOINTMENT (OUTPATIENT)
Dept: CT IMAGING | Age: 68
End: 2023-09-03
Payer: MEDICARE

## 2023-09-03 ENCOUNTER — ANESTHESIA (OUTPATIENT)
Dept: ENDOSCOPY | Age: 68
End: 2023-09-03
Payer: MEDICARE

## 2023-09-03 ENCOUNTER — HOSPITAL ENCOUNTER (INPATIENT)
Age: 68
LOS: 18 days | Discharge: NURSING, REHAB OR LONG-TERM CARE | End: 2023-09-21
Attending: STUDENT IN AN ORGANIZED HEALTH CARE EDUCATION/TRAINING PROGRAM | Admitting: INTERNAL MEDICINE
Payer: MEDICARE

## 2023-09-03 ENCOUNTER — ANESTHESIA EVENT (OUTPATIENT)
Dept: ENDOSCOPY | Age: 68
End: 2023-09-03
Payer: MEDICARE

## 2023-09-03 DIAGNOSIS — K92.0 COFFEE GROUND EMESIS: ICD-10-CM

## 2023-09-03 DIAGNOSIS — R57.8 HEMORRHAGIC SHOCK (HCC): Primary | ICD-10-CM

## 2023-09-03 DIAGNOSIS — K92.1 MELENA: ICD-10-CM

## 2023-09-03 PROBLEM — R57.1 HYPOVOLEMIC SHOCK (HCC): Status: ACTIVE | Noted: 2023-09-03

## 2023-09-03 PROBLEM — K70.30 ALCOHOLIC CIRRHOSIS (HCC): Status: ACTIVE | Noted: 2023-09-03

## 2023-09-03 PROBLEM — K92.2 UPPER GI BLEED: Status: ACTIVE | Noted: 2023-09-03

## 2023-09-03 PROBLEM — D62 ACUTE BLOOD LOSS ANEMIA: Status: ACTIVE | Noted: 2023-09-03

## 2023-09-03 PROBLEM — I85.00 ESOPHAGEAL VARICES (HCC): Status: ACTIVE | Noted: 2023-09-03

## 2023-09-03 PROBLEM — K86.1 CHRONIC PANCREATITIS (HCC): Status: ACTIVE | Noted: 2023-09-03

## 2023-09-03 PROBLEM — K76.6 PORTAL HYPERTENSION (HCC): Status: ACTIVE | Noted: 2023-09-03

## 2023-09-03 PROBLEM — E78.5 HYPERLIPIDEMIA: Status: ACTIVE | Noted: 2023-09-03

## 2023-09-03 LAB
ABO + RH BLD: NORMAL
ALBUMIN SERPL-MCNC: 2.4 G/DL (ref 3.4–5)
ALBUMIN/GLOB SERPL: 1 {RATIO} (ref 1.1–2.2)
ALP SERPL-CCNC: 131 U/L (ref 40–129)
ALT SERPL-CCNC: 21 U/L (ref 10–40)
ANION GAP SERPL CALCULATED.3IONS-SCNC: 16 MMOL/L (ref 3–16)
APTT BLD: 36.1 SEC (ref 22.7–35.9)
AST SERPL-CCNC: 51 U/L (ref 15–37)
BACTERIA URNS QL MICRO: ABNORMAL /HPF
BASOPHILS # BLD: 0 K/UL (ref 0–0.2)
BASOPHILS NFR BLD: 0.1 %
BILIRUB SERPL-MCNC: 3 MG/DL (ref 0–1)
BILIRUB UR QL STRIP.AUTO: ABNORMAL
BLD GP AB SCN SERPL QL: NORMAL
BLOOD BANK DISPENSE STATUS: NORMAL
BLOOD BANK DISPENSE STATUS: NORMAL
BLOOD BANK PRODUCT CODE: NORMAL
BLOOD BANK PRODUCT CODE: NORMAL
BPU ID: NORMAL
BPU ID: NORMAL
BUN SERPL-MCNC: 24 MG/DL (ref 7–20)
CALCIUM SERPL-MCNC: 8.9 MG/DL (ref 8.3–10.6)
CHLORIDE SERPL-SCNC: 100 MMOL/L (ref 99–110)
CLARITY UR: CLEAR
CO2 SERPL-SCNC: 30 MMOL/L (ref 21–32)
COLOR UR: ABNORMAL
CREAT SERPL-MCNC: 0.8 MG/DL (ref 0.6–1.2)
DEPRECATED RDW RBC AUTO: 24.2 % (ref 12.4–15.4)
DESCRIPTION BLOOD BANK: NORMAL
DESCRIPTION BLOOD BANK: NORMAL
EOSINOPHIL # BLD: 0 K/UL (ref 0–0.6)
EOSINOPHIL NFR BLD: 0 %
EPI CELLS #/AREA URNS AUTO: 7 /HPF (ref 0–5)
GFR SERPLBLD CREATININE-BSD FMLA CKD-EPI: >60 ML/MIN/{1.73_M2}
GLUCOSE SERPL-MCNC: 163 MG/DL (ref 70–99)
GLUCOSE UR STRIP.AUTO-MCNC: NEGATIVE MG/DL
GRANULAR CASTS: PRESENT
HCT VFR BLD AUTO: 20.5 % (ref 36–48)
HCT VFR BLD AUTO: 29.4 % (ref 36–48)
HGB BLD-MCNC: 6.5 G/DL (ref 12–16)
HGB BLD-MCNC: 9.5 G/DL (ref 12–16)
HGB UR QL STRIP.AUTO: NEGATIVE
HYALINE CASTS #/AREA URNS AUTO: 31 /LPF (ref 0–8)
HYALINE CASTS: PRESENT
INR PPP: 2.78 (ref 0.84–1.16)
KETONES UR STRIP.AUTO-MCNC: ABNORMAL MG/DL
LACTATE BLDV-SCNC: 4.4 MMOL/L (ref 0.4–1.9)
LACTATE BLDV-SCNC: 5.2 MMOL/L (ref 0.4–1.9)
LEUKOCYTE ESTERASE UR QL STRIP.AUTO: ABNORMAL
LIPASE SERPL-CCNC: 24 U/L (ref 13–60)
LYMPHOCYTES # BLD: 1.1 K/UL (ref 1–5.1)
LYMPHOCYTES NFR BLD: 11.6 %
MCH RBC QN AUTO: 26.9 PG (ref 26–34)
MCHC RBC AUTO-ENTMCNC: 31.8 G/DL (ref 31–36)
MCV RBC AUTO: 84.5 FL (ref 80–100)
MONOCYTES # BLD: 0.7 K/UL (ref 0–1.3)
MONOCYTES NFR BLD: 7.5 %
NEUTROPHILS # BLD: 7.5 K/UL (ref 1.7–7.7)
NEUTROPHILS NFR BLD: 80.8 %
NITRITE UR QL STRIP.AUTO: NEGATIVE
PH UR STRIP.AUTO: 6 [PH] (ref 5–8)
PLATELET # BLD AUTO: 124 K/UL (ref 135–450)
PMV BLD AUTO: 8.6 FL (ref 5–10.5)
POTASSIUM SERPL-SCNC: 3.9 MMOL/L (ref 3.5–5.1)
PROT SERPL-MCNC: 4.8 G/DL (ref 6.4–8.2)
PROT UR STRIP.AUTO-MCNC: 30 MG/DL
PROTHROMBIN TIME: 29.1 SEC (ref 11.5–14.8)
RBC # BLD AUTO: 2.42 M/UL (ref 4–5.2)
RBC CLUMPS #/AREA URNS AUTO: 3 /HPF (ref 0–4)
SODIUM SERPL-SCNC: 146 MMOL/L (ref 136–145)
SP GR UR STRIP.AUTO: 1.02 (ref 1–1.03)
UA COMPLETE W REFLEX CULTURE PNL UR: ABNORMAL
UA DIPSTICK W REFLEX MICRO PNL UR: YES
URN SPEC COLLECT METH UR: ABNORMAL
UROBILINOGEN UR STRIP-ACNC: 1 E.U./DL
WBC # BLD AUTO: 9.3 K/UL (ref 4–11)
WBC #/AREA URNS AUTO: 7 /HPF (ref 0–5)

## 2023-09-03 PROCEDURE — 86900 BLOOD TYPING SEROLOGIC ABO: CPT

## 2023-09-03 PROCEDURE — 86920 COMPATIBILITY TEST SPIN: CPT

## 2023-09-03 PROCEDURE — 85025 COMPLETE CBC W/AUTO DIFF WBC: CPT

## 2023-09-03 PROCEDURE — 3609012300 HC EGD BAND LIGATION ESOPHGEAL/GASTRIC VARICES: Performed by: INTERNAL MEDICINE

## 2023-09-03 PROCEDURE — 6360000002 HC RX W HCPCS: Performed by: ANESTHESIOLOGY

## 2023-09-03 PROCEDURE — 30233N1 TRANSFUSION OF NONAUTOLOGOUS RED BLOOD CELLS INTO PERIPHERAL VEIN, PERCUTANEOUS APPROACH: ICD-10-PCS | Performed by: INTERNAL MEDICINE

## 2023-09-03 PROCEDURE — 86901 BLOOD TYPING SEROLOGIC RH(D): CPT

## 2023-09-03 PROCEDURE — 85014 HEMATOCRIT: CPT

## 2023-09-03 PROCEDURE — 2709999900 HC NON-CHARGEABLE SUPPLY: Performed by: INTERNAL MEDICINE

## 2023-09-03 PROCEDURE — 6360000002 HC RX W HCPCS: Performed by: NURSE ANESTHETIST, CERTIFIED REGISTERED

## 2023-09-03 PROCEDURE — 85730 THROMBOPLASTIN TIME PARTIAL: CPT

## 2023-09-03 PROCEDURE — 87040 BLOOD CULTURE FOR BACTERIA: CPT

## 2023-09-03 PROCEDURE — 96361 HYDRATE IV INFUSION ADD-ON: CPT

## 2023-09-03 PROCEDURE — A4216 STERILE WATER/SALINE, 10 ML: HCPCS | Performed by: STUDENT IN AN ORGANIZED HEALTH CARE EDUCATION/TRAINING PROGRAM

## 2023-09-03 PROCEDURE — 2580000003 HC RX 258: Performed by: INTERNAL MEDICINE

## 2023-09-03 PROCEDURE — 6360000002 HC RX W HCPCS: Performed by: STUDENT IN AN ORGANIZED HEALTH CARE EDUCATION/TRAINING PROGRAM

## 2023-09-03 PROCEDURE — 2000000000 HC ICU R&B

## 2023-09-03 PROCEDURE — 99285 EMERGENCY DEPT VISIT HI MDM: CPT

## 2023-09-03 PROCEDURE — 3700000001 HC ADD 15 MINUTES (ANESTHESIA): Performed by: INTERNAL MEDICINE

## 2023-09-03 PROCEDURE — 83605 ASSAY OF LACTIC ACID: CPT

## 2023-09-03 PROCEDURE — 36592 COLLECT BLOOD FROM PICC: CPT

## 2023-09-03 PROCEDURE — 85610 PROTHROMBIN TIME: CPT

## 2023-09-03 PROCEDURE — 2580000003 HC RX 258: Performed by: STUDENT IN AN ORGANIZED HEALTH CARE EDUCATION/TRAINING PROGRAM

## 2023-09-03 PROCEDURE — 86850 RBC ANTIBODY SCREEN: CPT

## 2023-09-03 PROCEDURE — 36556 INSERT NON-TUNNEL CV CATH: CPT

## 2023-09-03 PROCEDURE — 06L28CZ OCCLUSION OF GASTRIC VEIN WITH EXTRALUMINAL DEVICE, VIA NATURAL OR ARTIFICIAL OPENING ENDOSCOPIC: ICD-10-PCS | Performed by: INTERNAL MEDICINE

## 2023-09-03 PROCEDURE — 85018 HEMOGLOBIN: CPT

## 2023-09-03 PROCEDURE — 6360000004 HC RX CONTRAST MEDICATION: Performed by: STUDENT IN AN ORGANIZED HEALTH CARE EDUCATION/TRAINING PROGRAM

## 2023-09-03 PROCEDURE — 83690 ASSAY OF LIPASE: CPT

## 2023-09-03 PROCEDURE — 3700000000 HC ANESTHESIA ATTENDED CARE: Performed by: INTERNAL MEDICINE

## 2023-09-03 PROCEDURE — 2500000003 HC RX 250 WO HCPCS: Performed by: ANESTHESIOLOGY

## 2023-09-03 PROCEDURE — 96374 THER/PROPH/DIAG INJ IV PUSH: CPT

## 2023-09-03 PROCEDURE — P9016 RBC LEUKOCYTES REDUCED: HCPCS

## 2023-09-03 PROCEDURE — 96375 TX/PRO/DX INJ NEW DRUG ADDON: CPT

## 2023-09-03 PROCEDURE — 74174 CTA ABD&PLVS W/CONTRAST: CPT

## 2023-09-03 PROCEDURE — 81001 URINALYSIS AUTO W/SCOPE: CPT

## 2023-09-03 PROCEDURE — C9113 INJ PANTOPRAZOLE SODIUM, VIA: HCPCS | Performed by: STUDENT IN AN ORGANIZED HEALTH CARE EDUCATION/TRAINING PROGRAM

## 2023-09-03 PROCEDURE — 36415 COLL VENOUS BLD VENIPUNCTURE: CPT

## 2023-09-03 PROCEDURE — 80053 COMPREHEN METABOLIC PANEL: CPT

## 2023-09-03 RX ORDER — 0.9 % SODIUM CHLORIDE 0.9 %
1000 INTRAVENOUS SOLUTION INTRAVENOUS ONCE
Status: DISCONTINUED | OUTPATIENT
Start: 2023-09-03 | End: 2023-09-05

## 2023-09-03 RX ORDER — SODIUM CHLORIDE 0.9 % (FLUSH) 0.9 %
5-40 SYRINGE (ML) INJECTION PRN
Status: DISCONTINUED | OUTPATIENT
Start: 2023-09-03 | End: 2023-09-21 | Stop reason: HOSPADM

## 2023-09-03 RX ORDER — LABETALOL HYDROCHLORIDE 5 MG/ML
INJECTION, SOLUTION INTRAVENOUS PRN
Status: DISCONTINUED | OUTPATIENT
Start: 2023-09-03 | End: 2023-09-03 | Stop reason: SDUPTHER

## 2023-09-03 RX ORDER — PROPOFOL 10 MG/ML
INJECTION, EMULSION INTRAVENOUS PRN
Status: DISCONTINUED | OUTPATIENT
Start: 2023-09-03 | End: 2023-09-03 | Stop reason: SDUPTHER

## 2023-09-03 RX ORDER — ACETAMINOPHEN 650 MG/1
650 SUPPOSITORY RECTAL EVERY 4 HOURS PRN
Status: DISCONTINUED | OUTPATIENT
Start: 2023-09-03 | End: 2023-09-21 | Stop reason: HOSPADM

## 2023-09-03 RX ORDER — ACETAMINOPHEN 325 MG/1
650 TABLET ORAL EVERY 4 HOURS PRN
Status: DISCONTINUED | OUTPATIENT
Start: 2023-09-03 | End: 2023-09-21 | Stop reason: HOSPADM

## 2023-09-03 RX ORDER — SODIUM CHLORIDE 9 MG/ML
INJECTION, SOLUTION INTRAVENOUS PRN
Status: COMPLETED | OUTPATIENT
Start: 2023-09-03 | End: 2023-09-03

## 2023-09-03 RX ORDER — POLYETHYLENE GLYCOL 3350 17 G/17G
17 POWDER, FOR SOLUTION ORAL DAILY PRN
Status: DISCONTINUED | OUTPATIENT
Start: 2023-09-03 | End: 2023-09-21 | Stop reason: HOSPADM

## 2023-09-03 RX ORDER — SODIUM CHLORIDE 0.9 % (FLUSH) 0.9 %
5-40 SYRINGE (ML) INJECTION EVERY 12 HOURS SCHEDULED
Status: DISCONTINUED | OUTPATIENT
Start: 2023-09-03 | End: 2023-09-21 | Stop reason: HOSPADM

## 2023-09-03 RX ORDER — ONDANSETRON 2 MG/ML
4 INJECTION INTRAMUSCULAR; INTRAVENOUS ONCE
Status: COMPLETED | OUTPATIENT
Start: 2023-09-03 | End: 2023-09-03

## 2023-09-03 RX ORDER — SUCCINYLCHOLINE/SOD CL,ISO/PF 200MG/10ML
SYRINGE (ML) INTRAVENOUS PRN
Status: DISCONTINUED | OUTPATIENT
Start: 2023-09-03 | End: 2023-09-03 | Stop reason: SDUPTHER

## 2023-09-03 RX ORDER — OCTREOTIDE ACETATE 100 UG/ML
50 INJECTION, SOLUTION INTRAVENOUS; SUBCUTANEOUS ONCE
Status: COMPLETED | OUTPATIENT
Start: 2023-09-03 | End: 2023-09-03

## 2023-09-03 RX ORDER — SODIUM CHLORIDE 9 MG/ML
INJECTION, SOLUTION INTRAVENOUS CONTINUOUS
Status: DISCONTINUED | OUTPATIENT
Start: 2023-09-03 | End: 2023-09-05

## 2023-09-03 RX ORDER — SODIUM CHLORIDE 9 MG/ML
INJECTION, SOLUTION INTRAVENOUS PRN
Status: DISCONTINUED | OUTPATIENT
Start: 2023-09-03 | End: 2023-09-03 | Stop reason: SDUPTHER

## 2023-09-03 RX ORDER — FENTANYL CITRATE 50 UG/ML
INJECTION, SOLUTION INTRAMUSCULAR; INTRAVENOUS PRN
Status: DISCONTINUED | OUTPATIENT
Start: 2023-09-03 | End: 2023-09-03 | Stop reason: SDUPTHER

## 2023-09-03 RX ORDER — PHENYLEPHRINE HCL IN 0.9% NACL 1 MG/10 ML
SYRINGE (ML) INTRAVENOUS PRN
Status: DISCONTINUED | OUTPATIENT
Start: 2023-09-03 | End: 2023-09-03 | Stop reason: SDUPTHER

## 2023-09-03 RX ORDER — SODIUM CHLORIDE 0.9 % (FLUSH) 0.9 %
10 SYRINGE (ML) INJECTION PRN
Status: DISCONTINUED | OUTPATIENT
Start: 2023-09-03 | End: 2023-09-03 | Stop reason: SDUPTHER

## 2023-09-03 RX ORDER — SODIUM CHLORIDE 9 MG/ML
INJECTION, SOLUTION INTRAVENOUS PRN
Status: DISCONTINUED | OUTPATIENT
Start: 2023-09-03 | End: 2023-09-21 | Stop reason: HOSPADM

## 2023-09-03 RX ORDER — SODIUM CHLORIDE 0.9 % (FLUSH) 0.9 %
10 SYRINGE (ML) INJECTION EVERY 12 HOURS SCHEDULED
Status: DISCONTINUED | OUTPATIENT
Start: 2023-09-03 | End: 2023-09-03 | Stop reason: SDUPTHER

## 2023-09-03 RX ORDER — 0.9 % SODIUM CHLORIDE 0.9 %
1000 INTRAVENOUS SOLUTION INTRAVENOUS ONCE
Status: COMPLETED | OUTPATIENT
Start: 2023-09-03 | End: 2023-09-03

## 2023-09-03 RX ORDER — ONDANSETRON 2 MG/ML
4 INJECTION INTRAMUSCULAR; INTRAVENOUS EVERY 4 HOURS PRN
Status: DISCONTINUED | OUTPATIENT
Start: 2023-09-03 | End: 2023-09-21 | Stop reason: HOSPADM

## 2023-09-03 RX ADMIN — SODIUM CHLORIDE: 9 INJECTION, SOLUTION INTRAVENOUS at 19:42

## 2023-09-03 RX ADMIN — SODIUM CHLORIDE 1000 ML: 9 INJECTION, SOLUTION INTRAVENOUS at 17:22

## 2023-09-03 RX ADMIN — Medication 200 MCG: at 19:47

## 2023-09-03 RX ADMIN — SODIUM CHLORIDE: 9 INJECTION, SOLUTION INTRAVENOUS at 21:39

## 2023-09-03 RX ADMIN — ONDANSETRON 4 MG: 2 INJECTION INTRAMUSCULAR; INTRAVENOUS at 18:07

## 2023-09-03 RX ADMIN — Medication 140 MG: at 19:44

## 2023-09-03 RX ADMIN — PANTOPRAZOLE SODIUM 8 MG/HR: 40 INJECTION, POWDER, FOR SOLUTION INTRAVENOUS at 17:15

## 2023-09-03 RX ADMIN — FENTANYL CITRATE 50 MCG: 50 INJECTION INTRAMUSCULAR; INTRAVENOUS at 19:45

## 2023-09-03 RX ADMIN — OCTREOTIDE ACETATE 50 MCG: 100 INJECTION, SOLUTION INTRAVENOUS; SUBCUTANEOUS at 17:53

## 2023-09-03 RX ADMIN — PROPOFOL 100 MG: 10 INJECTION, EMULSION INTRAVENOUS at 19:44

## 2023-09-03 RX ADMIN — SODIUM CHLORIDE 80 MG: 9 INJECTION INTRAMUSCULAR; INTRAVENOUS; SUBCUTANEOUS at 17:36

## 2023-09-03 RX ADMIN — LABETALOL HYDROCHLORIDE 5 MG: 5 INJECTION, SOLUTION INTRAVENOUS at 20:12

## 2023-09-03 RX ADMIN — IOPAMIDOL 75 ML: 755 INJECTION, SOLUTION INTRAVENOUS at 17:32

## 2023-09-03 RX ADMIN — CEFTRIAXONE SODIUM 2000 MG: 2 INJECTION, POWDER, FOR SOLUTION INTRAMUSCULAR; INTRAVENOUS at 18:02

## 2023-09-03 ASSESSMENT — ENCOUNTER SYMPTOMS: SHORTNESS OF BREATH: 0

## 2023-09-03 ASSESSMENT — LIFESTYLE VARIABLES: SMOKING_STATUS: 1

## 2023-09-03 ASSESSMENT — PAIN SCALES - GENERAL
PAINLEVEL_OUTOF10: 8
PAINLEVEL_OUTOF10: 0

## 2023-09-03 ASSESSMENT — PAIN - FUNCTIONAL ASSESSMENT: PAIN_FUNCTIONAL_ASSESSMENT: 0-10

## 2023-09-03 NOTE — ED TRIAGE NOTES
Noe Ross is a 76 y.o. female was brought by squad for eval of coffee ground emesis and rectal bleeding that started last night. EMS stated that they found her covered in dark stool and coffee ground emesis. The patient is alert and oriented but states that she is tired. The patient states that she has a history of varices and cirrhosis. The patient states that she is suppose to have 3 varices banded in Oct.  The patients Spo2 was 80% on room air, the patient was placed on 3 Lpm O2 NC. The patient was also hypotensive and tachycardic. ED provider was at bedside and emergent blood and central line placed. The patient has an open and patent airway with an increased respiratory effort.

## 2023-09-03 NOTE — ED NOTES
Received call from Endo, stated they would be getting patient from the ED, then take patient to 1302. [facility] charge RN notified.       Sherrie Wilkins RN  09/03/23 4873

## 2023-09-03 NOTE — ED PROVIDER NOTES
Bilirubin 3.0 (H) 0.0 - 1.0 mg/dL    Alkaline Phosphatase 131 (H) 40 - 129 U/L    ALT 21 10 - 40 U/L    AST 51 (H) 15 - 37 U/L   Lipase   Result Value Ref Range    Lipase 24.0 13.0 - 60.0 U/L   Protime-INR   Result Value Ref Range    Protime 29.1 (H) 11.5 - 14.8 sec    INR 2.78 (H) 0.84 - 1.16   APTT   Result Value Ref Range    aPTT 36.1 (H) 22.7 - 35.9 sec   PREPARE RBC (CROSSMATCH), 2 Units   Result Value Ref Range    Product Code Blood Bank [id number]     Description Blood Bank Red Blood Cells, Leuko-reduced     Unit Number [id number]     Dispense Status Blood Bank issued     Product Code Blood Bank [id number]     Description Blood Bank Red Blood Cells, Leuko-reduced     Unit Number [id number]     Dispense Status Blood Bank issued    TYPE AND SCREEN   Result Value Ref Range    ABO/Rh O POS     Antibody Screen NEG          RADIOLOGY  I have reviewed all radiographic studies for this visit. CTA ABDOMEN PELVIS W CONTRAST    (Results Pending)          My ECG interpretation  N/A    ED COURSE/MDM    76 y.o. female presenting to the ED for hematemesis and melena. On arrival patient is unstable and hypotensive. Patient's systolic blood pressure is in the 60s with maps ranging from 40s to 50s.  2 large-bore IV access points were established. Differential diagnosis includes but not limited to esophageal varices, peptic ulcer disease, upper GI bleed, lower GI bleed. Based on patient's physical exam and history she is most likely suffering from esophageal varices bleeding. Patient's abdomen is distended. Patient's bed is covered in melena. After IV access was established patient was immediately pressure bagged a 1 L bolus of normal saline. On reevaluation patient blood pressure improved to systolics in the 93U with map now in the 60s. Labs including type and screen were ordered. CTA of abdomen and pelvis were ordered as well.  2 units of packed red blood cells were ordered.  50mcg Octreotide, 2grams of counseled the patient as to how to take these medications. New Prescriptions    No medications on file       Patient instructed to follow up with:   No follow-up provider specified. All questions were answered and the patient/family expressed understanding and agreement with the plan. PROCEDURES  Central Line    Date/Time: 9/6/2023 6:58 PM  Performed by: Serina Mcwilliams MD  Authorized by: Ingris Blackburn MD     Consent:     Consent obtained:  Verbal    Consent given by:  Patient    Risks discussed:  Bleeding, infection, incorrect placement and arterial puncture  Universal protocol:     Procedure explained and questions answered to patient or proxy's satisfaction: yes      Patient identity confirmed:  Verbally with patient  Pre-procedure details:     Indication(s): central venous access, hemodynamic monitoring and insufficient peripheral access      Skin preparation:  Chlorhexidine  Sedation:     Sedation type:  None  Anesthesia:     Anesthesia method:  Local infiltration    Local anesthetic:  Lidocaine 1% w/o epi  Procedure details:     Location:  L femoral    Patient position:  Trendelenburg    Procedural supplies:  Triple lumen    Catheter size:  13 Fr    Ultrasound guidance: yes      Ultrasound guidance timing: prior to insertion and real time      Sterile ultrasound techniques: Sterile gel and sterile probe covers were used      Number of attempts:  1    Successful placement: yes    Post-procedure details:     Post-procedure:  Line sutured and dressing applied    Assessment:  Blood return through all ports and free fluid flow    Procedure completion:  Tolerated well, no immediate complications        CRITICAL CARE  I personally saw the patient and independently provided 30 minutes of non-concurrent critical care out of the total shared critical care time provided. This excludes seperately billable procedures.  Critical care time was provided for hemorrhagic shock that required close evaluation and/or

## 2023-09-03 NOTE — ED NOTES
.ED SBAR report provider to [address]. Patient to be transported to Room 1302 via stretcher by RN. Patient transported with bedside cardiac monitor and with IV medications infusing. IV site clean, dry, and intact. MEWS score 3 and pain assessed as 8/10 and documented.       Miguel Mendez, [address] [place] [address]  09/03/23 1905

## 2023-09-03 NOTE — ED NOTES
Pt vomiting a large amount of coffee ground emesis, ED provider notified, Zofran ordered      Naeem Liu, BENJA  09/03/23 0094

## 2023-09-04 LAB
AMMONIA PLAS-SCNC: 58 UMOL/L (ref 11–51)
ANION GAP SERPL CALCULATED.3IONS-SCNC: 7 MMOL/L (ref 3–16)
ANISOCYTOSIS BLD QL SMEAR: ABNORMAL
BASOPHILS # BLD: 0 K/UL (ref 0–0.2)
BASOPHILS NFR BLD: 0.4 %
BUN SERPL-MCNC: 26 MG/DL (ref 7–20)
CALCIUM SERPL-MCNC: 7.6 MG/DL (ref 8.3–10.6)
CHLORIDE SERPL-SCNC: 111 MMOL/L (ref 99–110)
CO2 SERPL-SCNC: 29 MMOL/L (ref 21–32)
CREAT SERPL-MCNC: 0.6 MG/DL (ref 0.6–1.2)
DACRYOCYTES BLD QL SMEAR: ABNORMAL
DEPRECATED RDW RBC AUTO: 17.3 % (ref 12.4–15.4)
EMERGENCY ISSUE BLOOD PRODUCTS SIGNED FORM: NORMAL
EOSINOPHIL # BLD: 0 K/UL (ref 0–0.6)
EOSINOPHIL NFR BLD: 0.7 %
GFR SERPLBLD CREATININE-BSD FMLA CKD-EPI: >60 ML/MIN/{1.73_M2}
GLUCOSE SERPL-MCNC: 123 MG/DL (ref 70–99)
HBV SURFACE AB SERPL IA-ACNC: 132.6 MIU/ML
HCT VFR BLD AUTO: 23 % (ref 36–48)
HCT VFR BLD AUTO: 23.1 % (ref 36–48)
HCT VFR BLD AUTO: 23.3 % (ref 36–48)
HCT VFR BLD AUTO: 24.4 % (ref 36–48)
HCT VFR BLD AUTO: 24.8 % (ref 36–48)
HCT VFR BLD AUTO: 27 % (ref 36–48)
HGB BLD-MCNC: 7.6 G/DL (ref 12–16)
HGB BLD-MCNC: 7.7 G/DL (ref 12–16)
HGB BLD-MCNC: 7.8 G/DL (ref 12–16)
HGB BLD-MCNC: 8 G/DL (ref 12–16)
HGB BLD-MCNC: 8.2 G/DL (ref 12–16)
HGB BLD-MCNC: 8.9 G/DL (ref 12–16)
LYMPHOCYTES # BLD: 1.2 K/UL (ref 1–5.1)
LYMPHOCYTES NFR BLD: 23.8 %
MAGNESIUM SERPL-MCNC: 1.3 MG/DL (ref 1.8–2.4)
MCH RBC QN AUTO: 28.3 PG (ref 26–34)
MCHC RBC AUTO-ENTMCNC: 33.1 G/DL (ref 31–36)
MCV RBC AUTO: 85.6 FL (ref 80–100)
MICROCYTES BLD QL SMEAR: ABNORMAL
MONOCYTES # BLD: 0.5 K/UL (ref 0–1.3)
MONOCYTES NFR BLD: 9 %
NEUTROPHILS # BLD: 3.4 K/UL (ref 1.7–7.7)
NEUTROPHILS NFR BLD: 66.1 %
OVALOCYTES BLD QL SMEAR: ABNORMAL
PLATELET # BLD AUTO: 73 K/UL (ref 135–450)
PLATELET BLD QL SMEAR: ABNORMAL
PMV BLD AUTO: 8.7 FL (ref 5–10.5)
POIKILOCYTOSIS BLD QL SMEAR: ABNORMAL
POTASSIUM SERPL-SCNC: 3.5 MMOL/L (ref 3.5–5.1)
RBC # BLD AUTO: 2.9 M/UL (ref 4–5.2)
SCHISTOCYTES BLD QL SMEAR: ABNORMAL
SLIDE REVIEW: ABNORMAL
SODIUM SERPL-SCNC: 147 MMOL/L (ref 136–145)
WBC # BLD AUTO: 5.1 K/UL (ref 4–11)

## 2023-09-04 PROCEDURE — 94761 N-INVAS EAR/PLS OXIMETRY MLT: CPT

## 2023-09-04 PROCEDURE — 85014 HEMATOCRIT: CPT

## 2023-09-04 PROCEDURE — 2580000003 HC RX 258: Performed by: INTERNAL MEDICINE

## 2023-09-04 PROCEDURE — 83735 ASSAY OF MAGNESIUM: CPT

## 2023-09-04 PROCEDURE — 85018 HEMOGLOBIN: CPT

## 2023-09-04 PROCEDURE — 99291 CRITICAL CARE FIRST HOUR: CPT | Performed by: INTERNAL MEDICINE

## 2023-09-04 PROCEDURE — 82105 ALPHA-FETOPROTEIN SERUM: CPT

## 2023-09-04 PROCEDURE — 2700000000 HC OXYGEN THERAPY PER DAY

## 2023-09-04 PROCEDURE — C9113 INJ PANTOPRAZOLE SODIUM, VIA: HCPCS | Performed by: INTERNAL MEDICINE

## 2023-09-04 PROCEDURE — 2580000003 HC RX 258: Performed by: SPECIALIST

## 2023-09-04 PROCEDURE — 86706 HEP B SURFACE ANTIBODY: CPT

## 2023-09-04 PROCEDURE — 6360000002 HC RX W HCPCS: Performed by: INTERNAL MEDICINE

## 2023-09-04 PROCEDURE — 80048 BASIC METABOLIC PNL TOTAL CA: CPT

## 2023-09-04 PROCEDURE — 82140 ASSAY OF AMMONIA: CPT

## 2023-09-04 PROCEDURE — 6360000002 HC RX W HCPCS: Performed by: REGISTERED NURSE

## 2023-09-04 PROCEDURE — 85025 COMPLETE CBC W/AUTO DIFF WBC: CPT

## 2023-09-04 PROCEDURE — 2000000000 HC ICU R&B

## 2023-09-04 PROCEDURE — 86708 HEPATITIS A ANTIBODY: CPT

## 2023-09-04 RX ORDER — GAUZE BANDAGE 2" X 2"
100 BANDAGE TOPICAL DAILY
Status: DISCONTINUED | OUTPATIENT
Start: 2023-09-04 | End: 2023-09-06

## 2023-09-04 RX ORDER — MAGNESIUM SULFATE IN WATER 40 MG/ML
2000 INJECTION, SOLUTION INTRAVENOUS ONCE
Status: COMPLETED | OUTPATIENT
Start: 2023-09-04 | End: 2023-09-04

## 2023-09-04 RX ORDER — FOLIC ACID 1 MG/1
1 TABLET ORAL DAILY
Status: DISCONTINUED | OUTPATIENT
Start: 2023-09-04 | End: 2023-09-21 | Stop reason: HOSPADM

## 2023-09-04 RX ORDER — MULTIVITAMIN WITH IRON
1 TABLET ORAL DAILY
Status: DISCONTINUED | OUTPATIENT
Start: 2023-09-04 | End: 2023-09-21 | Stop reason: HOSPADM

## 2023-09-04 RX ORDER — LACTULOSE 10 G/15ML
20 SOLUTION ORAL 3 TIMES DAILY
Status: DISCONTINUED | OUTPATIENT
Start: 2023-09-04 | End: 2023-09-21 | Stop reason: HOSPADM

## 2023-09-04 RX ORDER — LORAZEPAM 2 MG/ML
2 INJECTION INTRAMUSCULAR ONCE
Status: COMPLETED | OUTPATIENT
Start: 2023-09-04 | End: 2023-09-04

## 2023-09-04 RX ORDER — SODIUM CHLORIDE 9 MG/ML
INJECTION, SOLUTION INTRAVENOUS PRN
Status: COMPLETED | OUTPATIENT
Start: 2023-09-04 | End: 2023-09-04

## 2023-09-04 RX ORDER — CALCIUM GLUCONATE 20 MG/ML
2000 INJECTION, SOLUTION INTRAVENOUS ONCE
Status: COMPLETED | OUTPATIENT
Start: 2023-09-04 | End: 2023-09-04

## 2023-09-04 RX ADMIN — SODIUM CHLORIDE: 9 INJECTION, SOLUTION INTRAVENOUS at 19:39

## 2023-09-04 RX ADMIN — PANTOPRAZOLE SODIUM 8 MG/HR: 40 INJECTION, POWDER, FOR SOLUTION INTRAVENOUS at 19:37

## 2023-09-04 RX ADMIN — OCTREOTIDE ACETATE 25 MCG/HR: 500 INJECTION, SOLUTION INTRAVENOUS; SUBCUTANEOUS at 10:21

## 2023-09-04 RX ADMIN — PANTOPRAZOLE SODIUM 8 MG/HR: 40 INJECTION, POWDER, FOR SOLUTION INTRAVENOUS at 09:26

## 2023-09-04 RX ADMIN — LORAZEPAM 2 MG: 2 INJECTION INTRAMUSCULAR; INTRAVENOUS at 21:35

## 2023-09-04 RX ADMIN — PHYTONADIONE 10 MG: 10 INJECTION, EMULSION INTRAMUSCULAR; INTRAVENOUS; SUBCUTANEOUS at 10:29

## 2023-09-04 RX ADMIN — SODIUM CHLORIDE: 9 INJECTION, SOLUTION INTRAVENOUS at 05:50

## 2023-09-04 RX ADMIN — SODIUM CHLORIDE: 9 INJECTION, SOLUTION INTRAVENOUS at 06:52

## 2023-09-04 RX ADMIN — MAGNESIUM SULFATE HEPTAHYDRATE 2000 MG: 40 INJECTION, SOLUTION INTRAVENOUS at 09:54

## 2023-09-04 RX ADMIN — CEFTRIAXONE 1000 MG: 1 INJECTION, POWDER, FOR SOLUTION INTRAMUSCULAR; INTRAVENOUS at 09:48

## 2023-09-04 RX ADMIN — CALCIUM GLUCONATE 2000 MG: 20 INJECTION, SOLUTION INTRAVENOUS at 10:28

## 2023-09-04 RX ADMIN — PANTOPRAZOLE SODIUM 8 MG/HR: 40 INJECTION, POWDER, FOR SOLUTION INTRAVENOUS at 01:18

## 2023-09-04 ASSESSMENT — PAIN DESCRIPTION - ORIENTATION: ORIENTATION: MID

## 2023-09-04 ASSESSMENT — PAIN DESCRIPTION - LOCATION
LOCATION: ABDOMEN
LOCATION: ABDOMEN

## 2023-09-04 ASSESSMENT — PAIN SCALES - GENERAL
PAINLEVEL_OUTOF10: 0
PAINLEVEL_OUTOF10: 8
PAINLEVEL_OUTOF10: 0

## 2023-09-04 ASSESSMENT — PAIN DESCRIPTION - DESCRIPTORS
DESCRIPTORS: PATIENT UNABLE TO DESCRIBE
DESCRIPTORS: PATIENT UNABLE TO DESCRIBE

## 2023-09-04 NOTE — CONSULTS
Pulmonary Consult Note     Patient's name:  Annabella Leyden  Medical Record Number: [id number]  Patient's account/billing number: [de-identified]  Patient's YOB: 1955  Age: 76 y.o. Date of Admission: 9/3/2023  3:40 PM  Date of Consult: 9/4/2023      Primary Care Physician: Aron Ahumada, RN, NP      Code Status: Full Code    Reason for consult: upper GI bleed     Assessment and Plan     Hypovolemic shock secondary to upper GI bleed  Variceal/Dieulafoy lesion  bleed status post banding  Acute blood loss anemia s/p 2 units of RBCs. Esophagitis  Liver cirrhosis  Coagulopathy was on Eliquis for DVT  Metabolic encephalopathy  H/o DVT        Plan:  Gentle hydration  Monitor H&H transfuse to keep more than 7  Protonix infusion  Octreotide infusion  Ceftriaxone x 7 days   Aspiration precautions  Check ammonia level    Due to the immediate potential for life-threatening deterioration due to above , I spent 35 minutes providing critical care. This time is excluding time spent performing procedures. This note was transcribed using [employer]. Please disregard any translational errors. HISTORY OF PRESENT ILLNESS:   Mr./Ms. Annabella Leyden is a 76 y.o. history came from medical record patient is encephalopathic and cannot provide history. She has a past medical history stated below significant for alcoholic cirrhosis with portal hypertension and ascites esophageal varices status post recent banding, chronic pancreatitis, history of DVT on Eliquis, history of esophagitis and esophageal varices, presented to emergency department for multiple episodes of hematemesis found with hemoglobin of 6.5 hypovolemic with blood pressure of 67/40 8 GI consulted status post variceal banding 9/3/2023          Past Medical History:  History reviewed. No pertinent past medical history.     Past Surgical History:        Procedure Laterality Date    COLONOSCOPY N/A

## 2023-09-04 NOTE — PROGRESS NOTES
Pt was refusing VS. Sitter was at beside and pt is now allowing BPs. Complete linen change and bath at this time with small BM noted.

## 2023-09-04 NOTE — H&P
Hospital Medicine  History and Physical    PCP: Matilda Medina, RN, NP  Patient Name: Adriana Knox    Date of Service: Pt seen/examined on 9/3/23 and admitted to Inpatient with expected LOS greater than two midnights due to medical therapy. CHIEF COMPLAINT:  Pt c/o multiple episodes of coffee ground emesis  HISTORY OF PRESENT ILLNESS: Pt is an 76y.o. year-old female with a history that includes a history of alcoholic cirrhosis with portal hypertension and known esophageal varices status post recent banding for upper GI bleed, chronic pancreatitis, history of DVT maintained on Eliquis and history of iron deficiency anemia now presents with complaints of upper GI bleed. Per GI, she underwent an upper endoscopy on July 1 with findings of grade D reflux esophagitis, medium size esophageal varices with overlying severe esophagitis, minimal portal hypertensive gastropathy and a large 2 cm cratered duodenal ulcer with pigmented spots. The varices were ligated. She is due for repeat EGD with ligation in October per scheduling. She presents to the emergency room for evaluation after having multiple episodes of coffee-ground emesis today before she arrived in the emergency room. In the emergency room she was found to have acute blood loss anemia with an initial hemoglobin of 6.5. She was found to have hypovolemic shock with an initial blood pressure of 67/48. She was resuscitated with IV fluids and transfused 2 units of packed red blood cells and her pressure responded favorably. She was given octreotide and started on a Protonix drip. While in the ER she had an episode of large volume hematemesis. Gastroenterology was consulted and came and after hours to perform an emergent Esophagogastroduodenoscopy with control of bleeding and variceal band ligation. Pt is being admitted to the ICU.  Associated signs and symptoms do not include fever or chills,diarrhea, abdominal pain, hematochezia, sweats, dark urine or Peritoneum/Retroperitoneum: Small amount of ascites. No free air. No lymphadenopathy. Left central venous catheter tip in the IVC. Mild atherosclerosis. Patent abdominal vasculature. Bones/Soft Tissues: Interval progression of compression deformity at L2, now with moderate height loss. A moderate to severe compression deformity is present at T11, new from prior exam.  Compression deformities at T12, L1, L3, and L4 are unchanged. 1. No evidence of acute arterial extravasation within the bowel. 2. Diffusely edematous appearance of the walls of the small bowel as well of the colon, which could be related to third spacing or hypoperfusion/shock. The mesenteric arteries are patent. 3. Distended stomach. 4. Mild diverticulosis distal colon without definite evidence of acute diverticulitis. 5. Cirrhotic liver with mild splenomegaly and a small amount of ascites. 6. Small to moderate sized hiatal hernia. 7. Chronic pancreatitis. 8. Moderate to severe compression deformity at T11, new from prior exam. Mild interval progression of a compression deformity at L2. Assessment:   Principal Problem:    Upper GI bleed  Active Problems:    Severe malnutrition (HCC)    Acute deep vein thrombosis (DVT) of iliac vein of left lower extremity (HCC)    Coffee ground emesis    Hematemesis    Acute blood loss anemia    Melena    Hypovolemic shock (HCC)    Hyperlipidemia    Alcoholic cirrhosis (HCC)    Portal hypertension (HCC)    Esophageal varices (HCC)    Chronic pancreatitis (HCC)  Resolved Problems:    * No resolved hospital problems. *      Plan:       Upper GI bleed - patient with multiple episodes of coffee-ground emesis prior to her arrival in the emergency room today. While in the emergency room she had an episode of hematemesis. She was given octreotide and started on a Protonix drip. Gastroenterology was consulted and came in after hours to perform an emergent EGD.   This is occurring as I write this note and

## 2023-09-04 NOTE — PROGRESS NOTES
4 Eyes Skin Assessment     NAME:  Jerica Valentin  YOB: 1955  MEDICAL RECORD NUMBER:  [id number]    The patient is being assessed for  Post-Op Surgical    I agree that at least one RN has performed a thorough Head to Toe Skin Assessment on the patient. ALL assessment sites listed below have been assessed. Areas assessed by both nurses:    Head, Face, Ears, Shoulders, Back, Chest, Arms, Elbows, Hands, Sacrum. Buttock, Coccyx, Ischium, and Legs. Feet and Heels        Does the Patient have a Wound?  No noted wound(s)       Benjamin Prevention initiated by RN: Yes  Wound Care Orders initiated by RN: No    Pressure Injury (Stage 3,4, Unstageable, DTI, NWPT, and Complex wounds) if present, place Wound referral order by RN under : No    New Ostomies, if present place, Ostomy referral order under : No     Nurse 1 eSignature: Electronically signed by Anselmo Stephens RN on 9/3/23 at 9:25 PM EDT    **SHARE this note so that the co-signing nurse can place an eSignature**    Nurse 2 eSignature: Electronically signed by Torsten Pradhan RN on 9/3/23 at 10:16 PM EDT

## 2023-09-04 NOTE — PROGRESS NOTES
Pt from PACU at this time. VSS. Pt responds to voice. Hgb drawn, and patient bathed. Will continue to monitor.

## 2023-09-04 NOTE — PROCEDURES
[facility]  Endoscopy Note    Patient: Anila Lopes  : 1955  Acct#:     Procedure: Esophagogastroduodenoscopy with control of bleeding, variceal band ligation    Date:  9/3/2023     Surgeon:  Latisha David MD      Anesthesia:    General anesthesia      EBL: <50 mL    Indications: Upper GI bleed    Description of Procedure:    Informed consent was obtained from the patient after explanation of indications, benefits and possible risks and complications of the procedure. The patient was then taken to the endoscopy suite, placed in the left lateral decubitus position and the above IV sedation was administrered. The Olympus videoendoscope (GIF-H190) was placed in the patient's mouth and under direct visualization passed into the esophagus. The scope was then advanced into the stomach and to the second portion of the duodenum. A retroflexed exam of the gastric cardia and fundus was performed. The scope was then withdrawn back into the stomach, it was decompressed, and the scope was completely withdrawn. Findings:  Normal 1st and second portion of the duodenum. Portal hypertensive gastropathy. Small hiatal hernia. Grade D reflux esophagitis in the lower half of the esophagus. No residual esophageal varices were seen. In the gastric cardia, approximately 5 mm distal to the gastroesophageal junction, there was a 1 mm purple protuberance. With irrigation of the protuberance brisk hemorrhage resulted. This lesion is either a Dieulafoy lesion >>atypical varix. A variceal  was placed over the head of the gastroscope and one band was deployed with excellent hemostasis at the end of the maneuver. The patient tolerated the procedure well and was taken to the post anesthesia care unit in good condition. Biopsies: no     Impression:    Normal 1st and second portion of the duodenum. Portal hypertensive gastropathy. Small hiatal hernia.   Grade D reflux esophagitis in the

## 2023-09-04 NOTE — PROGRESS NOTES
1230: The patient is not safe to swallow PO medications at this time. She has been very lethargic and obtunded throughout the day and is not able to open her eyes and stay alert long enough to where she would be able to safely swallow. I messaged critical care for guidance on how to give these medications. They have deferred to GI d/t patient's recent EGD and esophageal banding. I paged GI on call and am currently awaiting their response.    1600: I have not received a response from GI at this time. The patient is still unsafe to take PO medications. I paged the on call GI service again and will wait for their guidance on how to proceed.

## 2023-09-05 ENCOUNTER — APPOINTMENT (OUTPATIENT)
Dept: GENERAL RADIOLOGY | Age: 68
End: 2023-09-05
Payer: MEDICARE

## 2023-09-05 ENCOUNTER — APPOINTMENT (OUTPATIENT)
Dept: CT IMAGING | Age: 68
End: 2023-09-05
Payer: MEDICARE

## 2023-09-05 LAB
ANION GAP SERPL CALCULATED.3IONS-SCNC: 8 MMOL/L (ref 3–16)
ANISOCYTOSIS BLD QL SMEAR: ABNORMAL
BASOPHILS # BLD: 0 K/UL (ref 0–0.2)
BASOPHILS NFR BLD: 0 %
BUN SERPL-MCNC: 30 MG/DL (ref 7–20)
CALCIUM SERPL-MCNC: 7.9 MG/DL (ref 8.3–10.6)
CHLORIDE SERPL-SCNC: 115 MMOL/L (ref 99–110)
CO2 SERPL-SCNC: 26 MMOL/L (ref 21–32)
CREAT SERPL-MCNC: 0.6 MG/DL (ref 0.6–1.2)
DEPRECATED RDW RBC AUTO: 18.1 % (ref 12.4–15.4)
EOSINOPHIL # BLD: 0.1 K/UL (ref 0–0.6)
EOSINOPHIL NFR BLD: 2 %
GFR SERPLBLD CREATININE-BSD FMLA CKD-EPI: >60 ML/MIN/{1.73_M2}
GLUCOSE SERPL-MCNC: 109 MG/DL (ref 70–99)
HAV AB SER QL IA: POSITIVE
HCT VFR BLD AUTO: 21.5 % (ref 36–48)
HCT VFR BLD AUTO: 22.1 % (ref 36–48)
HCT VFR BLD AUTO: 22.3 % (ref 36–48)
HGB BLD-MCNC: 7.3 G/DL (ref 12–16)
HGB BLD-MCNC: 7.3 G/DL (ref 12–16)
HGB BLD-MCNC: 7.5 G/DL (ref 12–16)
LYMPHOCYTES # BLD: 0.6 K/UL (ref 1–5.1)
LYMPHOCYTES NFR BLD: 17 %
MAGNESIUM SERPL-MCNC: 1.9 MG/DL (ref 1.8–2.4)
MCH RBC QN AUTO: 28.4 PG (ref 26–34)
MCHC RBC AUTO-ENTMCNC: 33.4 G/DL (ref 31–36)
MCV RBC AUTO: 85.1 FL (ref 80–100)
MONOCYTES # BLD: 0.2 K/UL (ref 0–1.3)
MONOCYTES NFR BLD: 7 %
NEUTROPHILS # BLD: 2.5 K/UL (ref 1.7–7.7)
NEUTROPHILS NFR BLD: 74 %
PHOSPHATE SERPL-MCNC: 3 MG/DL (ref 2.5–4.9)
PLATELET # BLD AUTO: 59 K/UL (ref 135–450)
PLATELET BLD QL SMEAR: ABNORMAL
PMV BLD AUTO: 8.3 FL (ref 5–10.5)
POTASSIUM SERPL-SCNC: 3.5 MMOL/L (ref 3.5–5.1)
RBC # BLD AUTO: 2.62 M/UL (ref 4–5.2)
SLIDE REVIEW: ABNORMAL
SMUDGE CELLS BLD QL SMEAR: PRESENT
SODIUM SERPL-SCNC: 149 MMOL/L (ref 136–145)
WBC # BLD AUTO: 3.4 K/UL (ref 4–11)

## 2023-09-05 PROCEDURE — 99233 SBSQ HOSP IP/OBS HIGH 50: CPT | Performed by: INTERNAL MEDICINE

## 2023-09-05 PROCEDURE — 85014 HEMATOCRIT: CPT

## 2023-09-05 PROCEDURE — 6360000002 HC RX W HCPCS: Performed by: NURSE PRACTITIONER

## 2023-09-05 PROCEDURE — 2700000000 HC OXYGEN THERAPY PER DAY

## 2023-09-05 PROCEDURE — 36569 INSJ PICC 5 YR+ W/O IMAGING: CPT

## 2023-09-05 PROCEDURE — C9113 INJ PANTOPRAZOLE SODIUM, VIA: HCPCS | Performed by: INTERNAL MEDICINE

## 2023-09-05 PROCEDURE — 6370000000 HC RX 637 (ALT 250 FOR IP): Performed by: INTERNAL MEDICINE

## 2023-09-05 PROCEDURE — 02HV33Z INSERTION OF INFUSION DEVICE INTO SUPERIOR VENA CAVA, PERCUTANEOUS APPROACH: ICD-10-PCS | Performed by: INTERNAL MEDICINE

## 2023-09-05 PROCEDURE — A4217 STERILE WATER/SALINE, 500 ML: HCPCS | Performed by: INTERNAL MEDICINE

## 2023-09-05 PROCEDURE — 2580000003 HC RX 258: Performed by: INTERNAL MEDICINE

## 2023-09-05 PROCEDURE — 83735 ASSAY OF MAGNESIUM: CPT

## 2023-09-05 PROCEDURE — 80048 BASIC METABOLIC PNL TOTAL CA: CPT

## 2023-09-05 PROCEDURE — 6360000002 HC RX W HCPCS: Performed by: REGISTERED NURSE

## 2023-09-05 PROCEDURE — 70450 CT HEAD/BRAIN W/O DYE: CPT

## 2023-09-05 PROCEDURE — 2580000003 HC RX 258: Performed by: NURSE PRACTITIONER

## 2023-09-05 PROCEDURE — 6360000002 HC RX W HCPCS: Performed by: INTERNAL MEDICINE

## 2023-09-05 PROCEDURE — 51702 INSERT TEMP BLADDER CATH: CPT

## 2023-09-05 PROCEDURE — 94761 N-INVAS EAR/PLS OXIMETRY MLT: CPT

## 2023-09-05 PROCEDURE — 2000000000 HC ICU R&B

## 2023-09-05 PROCEDURE — 36592 COLLECT BLOOD FROM PICC: CPT

## 2023-09-05 PROCEDURE — 85025 COMPLETE CBC W/AUTO DIFF WBC: CPT

## 2023-09-05 PROCEDURE — C1751 CATH, INF, PER/CENT/MIDLINE: HCPCS

## 2023-09-05 PROCEDURE — 85018 HEMOGLOBIN: CPT

## 2023-09-05 PROCEDURE — 2500000003 HC RX 250 WO HCPCS: Performed by: NURSE PRACTITIONER

## 2023-09-05 PROCEDURE — 84100 ASSAY OF PHOSPHORUS: CPT

## 2023-09-05 PROCEDURE — 71045 X-RAY EXAM CHEST 1 VIEW: CPT

## 2023-09-05 PROCEDURE — 2580000003 HC RX 258: Performed by: ANESTHESIOLOGY

## 2023-09-05 PROCEDURE — APPNB15 APP NON BILLABLE TIME 0-15 MINS: Performed by: NURSE PRACTITIONER

## 2023-09-05 RX ORDER — SODIUM CHLORIDE 0.9 % (FLUSH) 0.9 %
5-40 SYRINGE (ML) INJECTION EVERY 12 HOURS SCHEDULED
Status: DISCONTINUED | OUTPATIENT
Start: 2023-09-05 | End: 2023-09-06

## 2023-09-05 RX ORDER — MAGNESIUM SULFATE IN WATER 40 MG/ML
2000 INJECTION, SOLUTION INTRAVENOUS ONCE
Status: COMPLETED | OUTPATIENT
Start: 2023-09-05 | End: 2023-09-05

## 2023-09-05 RX ORDER — POTASSIUM CHLORIDE 29.8 MG/ML
20 INJECTION INTRAVENOUS ONCE
Status: COMPLETED | OUTPATIENT
Start: 2023-09-05 | End: 2023-09-05

## 2023-09-05 RX ORDER — SODIUM CHLORIDE 0.9 % (FLUSH) 0.9 %
5-40 SYRINGE (ML) INJECTION PRN
Status: DISCONTINUED | OUTPATIENT
Start: 2023-09-05 | End: 2023-09-06

## 2023-09-05 RX ORDER — LIDOCAINE HYDROCHLORIDE 10 MG/ML
5 INJECTION, SOLUTION EPIDURAL; INFILTRATION; INTRACAUDAL; PERINEURAL ONCE
Status: COMPLETED | OUTPATIENT
Start: 2023-09-05 | End: 2023-09-05

## 2023-09-05 RX ORDER — CALCIUM GLUCONATE 20 MG/ML
1000 INJECTION, SOLUTION INTRAVENOUS ONCE
Status: COMPLETED | OUTPATIENT
Start: 2023-09-05 | End: 2023-09-05

## 2023-09-05 RX ORDER — SODIUM CHLORIDE 9 MG/ML
25 INJECTION, SOLUTION INTRAVENOUS PRN
Status: DISCONTINUED | OUTPATIENT
Start: 2023-09-05 | End: 2023-09-21 | Stop reason: HOSPADM

## 2023-09-05 RX ORDER — LORAZEPAM 2 MG/ML
1 INJECTION INTRAMUSCULAR EVERY 6 HOURS PRN
Status: DISPENSED | OUTPATIENT
Start: 2023-09-05 | End: 2023-09-06

## 2023-09-05 RX ORDER — SODIUM CHLORIDE, SODIUM LACTATE, POTASSIUM CHLORIDE, CALCIUM CHLORIDE 600; 310; 30; 20 MG/100ML; MG/100ML; MG/100ML; MG/100ML
INJECTION, SOLUTION INTRAVENOUS CONTINUOUS
Status: DISCONTINUED | OUTPATIENT
Start: 2023-09-05 | End: 2023-09-06

## 2023-09-05 RX ADMIN — PANTOPRAZOLE SODIUM 8 MG/HR: 40 INJECTION, POWDER, FOR SOLUTION INTRAVENOUS at 14:39

## 2023-09-05 RX ADMIN — LIDOCAINE HYDROCHLORIDE 5 ML: 10 INJECTION, SOLUTION EPIDURAL; INFILTRATION; INTRACAUDAL; PERINEURAL at 12:12

## 2023-09-05 RX ADMIN — Medication 10 ML: at 09:00

## 2023-09-05 RX ADMIN — MAGNESIUM SULFATE HEPTAHYDRATE 2000 MG: 40 INJECTION, SOLUTION INTRAVENOUS at 09:17

## 2023-09-05 RX ADMIN — Medication 10 ML: at 22:20

## 2023-09-05 RX ADMIN — SODIUM CHLORIDE, POTASSIUM CHLORIDE, SODIUM LACTATE AND CALCIUM CHLORIDE: 600; 310; 30; 20 INJECTION, SOLUTION INTRAVENOUS at 22:28

## 2023-09-05 RX ADMIN — SODIUM CHLORIDE, POTASSIUM CHLORIDE, SODIUM LACTATE AND CALCIUM CHLORIDE: 600; 310; 30; 20 INJECTION, SOLUTION INTRAVENOUS at 08:41

## 2023-09-05 RX ADMIN — Medication 10 ML: at 08:58

## 2023-09-05 RX ADMIN — LORAZEPAM 1 MG: 2 INJECTION INTRAMUSCULAR; INTRAVENOUS at 22:20

## 2023-09-05 RX ADMIN — PANTOPRAZOLE SODIUM 8 MG/HR: 40 INJECTION, POWDER, FOR SOLUTION INTRAVENOUS at 03:57

## 2023-09-05 RX ADMIN — CALCIUM GLUCONATE 1000 MG: 20 INJECTION, SOLUTION INTRAVENOUS at 09:08

## 2023-09-05 RX ADMIN — LACTULOSE SOLUTION USP, 10 G/15 ML: 10 SOLUTION ORAL; RECTAL at 21:00

## 2023-09-05 RX ADMIN — Medication 10 ML: at 20:55

## 2023-09-05 RX ADMIN — POTASSIUM CHLORIDE 20 MEQ: 29.8 INJECTION, SOLUTION INTRAVENOUS at 12:11

## 2023-09-05 RX ADMIN — OCTREOTIDE ACETATE 25 MCG/HR: 500 INJECTION, SOLUTION INTRAVENOUS; SUBCUTANEOUS at 03:01

## 2023-09-05 RX ADMIN — LORAZEPAM 1 MG: 2 INJECTION INTRAMUSCULAR; INTRAVENOUS at 03:19

## 2023-09-05 RX ADMIN — CEFTRIAXONE 1000 MG: 1 INJECTION, POWDER, FOR SOLUTION INTRAMUSCULAR; INTRAVENOUS at 11:05

## 2023-09-05 NOTE — PLAN OF CARE
Problem: Safety - Adult  Goal: Free from fall injury  Outcome: Progressing     Problem: Skin/Tissue Integrity  Goal: Absence of new skin breakdown  Description: 1. Monitor for areas of redness and/or skin breakdown  2. Assess vascular access sites hourly  3. Every 4-6 hours minimum:  Change oxygen saturation probe site  4. Every 4-6 hours:  If on nasal continuous positive airway pressure, respiratory therapy assess nares and determine need for appliance change or resting period. Outcome: Progressing     Problem: ABCDS Injury Assessment  Goal: Absence of physical injury  Outcome: Progressing        Problem: Confusion  Goal: Confusion, delirium, dementia, or psychosis is improved or at baseline  Description: INTERVENTIONS:  1. Assess for possible contributors to thought disturbance, including medications, impaired vision or hearing, underlying metabolic abnormalities, dehydration, psychiatric diagnoses, and notify attending LIP  2. Andrews high risk fall precautions, as indicated  3. Provide frequent short contacts to provide reality reorientation, refocusing and direction  4. Decrease environmental stimuli, including noise as appropriate  5. Monitor and intervene to maintain adequate nutrition, hydration, elimination, sleep and activity  6. If unable to ensure safety without constant attention obtain sitter and review sitter guidelines with assigned personnel  7.  Initiate Psychosocial CNS and Spiritual Care consult, as indicated  Outcome: Not Progressing

## 2023-09-05 NOTE — CARE COORDINATION
Chart Reviewed. Order rec'd for Consideration of Rehab (Alcohol use). Spoke with bedside RN who states pt is not alert nor oriented. Following for DC needs.   [facility], [address] [facility]     Case Management   [phone]    9/5/2023  2:35 PM

## 2023-09-05 NOTE — PROGRESS NOTES
4 Eyes Skin Assessment     NAME:  Essie Dallas  YOB: 1955  MEDICAL RECORD NUMBER:  [id number]    The patient is being assessed for  Shift Handoff    I agree that at least one RN has performed a thorough Head to Toe Skin Assessment on the patient. ALL assessment sites listed below have been assessed. Areas assessed by both nurses:    Head, Face, Ears, Shoulders, Back, Chest, Arms, Elbows, Hands, Sacrum. Buttock, Coccyx, Ischium, and Legs. Feet and Heels        Does the Patient have a Wound?  No noted wound(s)       Benjamin Prevention initiated by RN: Yes  Wound Care Orders initiated by RN: No    Pressure Injury (Stage 3,4, Unstageable, DTI, NWPT, and Complex wounds) if present, place Wound referral order by RN under : No    New Ostomies, if present place, Ostomy referral order under : No     Nurse 1 eSignature: Electronically signed by Brina Garcia RN on 9/5/23 at 7:55 PM EDT    **SHARE this note so that the co-signing nurse can place an eSignature**    Nurse 2 eSignature: Electronically signed by Marquita Justin RN on 9/6/23 at 3:07 AM EDT

## 2023-09-05 NOTE — PROGRESS NOTES
Pulmonary Progress Note    Date of Admission: 9/3/2023   LOS: 2 days       CC:  Chief Complaint   Patient presents with    Hematemesis     Vomiting dark blood that started last night    Rectal Bleeding     Dark blood started last night        Subjective:  Significant confusion overnight. Redirection. Assessment:        Hypovolemic shock secondary to upper GI bleed  Variceal/the Oscar lesion status post banding  Acute blood loss anemia  Liver cirrhosis  Esophagitis  Chronic pancreatitis  History of DVT on Eliquis  Metabolic encephalopathy    Plan: This note may have been transcribed using [facility] [employer]. Please disregard any translational errors. Hospital Day: 2     Altered mental status  Continues overnight. Likely secondary to alcohol withdrawal.  however, this is unsubstantiated. Currently on CIWA. Hyponatremia. BUN mildly elevated at 30. Ammonia 58. On KS lactulose. No head CT. We will get a head CT to assess for CVA. Respiratory  3 L nasal cannula. Attempt to wean to room air. Acute blood loss anemia secondary to varices status post banding  Banded 9/3  Status post 2 units PRBC. Hemoglobin appears to be stabilizing 7.5. BUN 30      Hypernatremia with hyperchloremia  Stop normal saline. Start lactated Ringer's at 76. Protein calorie malnutrition  Present on admission with albumin of 2.4. Unable to feed at this time. Nutrition  - Diet NPO Exceptions are: Ice Chips, Sips of Water with Meds  -   Intake/Output Summary (Last 24 hours) at 9/5/2023 0735  Last data filed at 9/5/2023 0400  Gross per 24 hour   Intake 2455.28 ml   Output --   Net 2455.28 ml       Mobility  Currently confused therefore remains in bed.     Access  Arterial      PICC          CVC       CVC Triple Lumen 09/03/23 Left Femoral (Active)   $ Central line insertion $ Yes 09/03/23 2107   Central Line Being Utilized Yes 09/05/23 0600   Criteria for Appropriate Use Hemodynamically unstable, pain  TECHNOLOGIST PROVIDED HISTORY:  Reason for exam:->Abdominal pain  Reason for Exam: Abdominal pain    FINDINGS:  The heart is enlarged. Mediastinum and pulmonary vascular markings are  normal.  The lungs are lucent and hyperinflated. No acute airspace  abnormality. No pneumothorax. No significant skeletal finding in the chest.    Impression  COPD with no acute finding in the chest.            This note was transcribed using [employer]. Please disregard any translational errors.       [facility], Sleep and [facility]

## 2023-09-05 NOTE — PROGRESS NOTES
Arrived to place PICC line with bedside RN Crystal Pre-procedure and timeout done with RN, discussed limitations of placement and allergies. Pt's right brachial are all easily collapsible with no indication for a clot. Vein selected is large enough for catheter. Pt tolerated sterile procedure well, with no difficulty accessing basilic vein, when accessed - blood was free flowing and non-pulsatile. Guidewire, introducer, and catheter went in smoothly. PICC line verified with 3CG technology with peaked P-waves (please see image below). OK to use PICC. Please use new IV tubing when connecting to the newly placed central line. Post procedure - reorganized pt table, placed pt in lowest position, with call light and educated on line care. Reported off to bedside RN. Please call if you have any questions about the PICC or ML. The  will direct you to the PICC RN that is on call.      [phone]

## 2023-09-05 NOTE — PLAN OF CARE
Problem: Pain  Goal: Verbalizes/displays adequate comfort level or baseline comfort level  Outcome: Progressing  Flowsheets  Taken 9/4/2023 2000 by Reginaldo Guzman RN  Verbalizes/displays adequate comfort level or baseline comfort level:   Encourage patient to monitor pain and request assistance   Assess pain using appropriate pain scale   Administer analgesics based on type and severity of pain and evaluate response   Implement non-pharmacological measures as appropriate and evaluate response   Notify Licensed Independent Practitioner if interventions unsuccessful or patient reports new pain   Consider cultural and social influences on pain and pain management  Taken 9/4/2023 0800 by Daphney French RN  Verbalizes/displays adequate comfort level or baseline comfort level:   Encourage patient to monitor pain and request assistance   Assess pain using appropriate pain scale     Problem: Safety - Adult  Goal: Free from fall injury  Outcome: Progressing     Problem: Skin/Tissue Integrity  Goal: Absence of new skin breakdown  Description: 1. Monitor for areas of redness and/or skin breakdown  2. Assess vascular access sites hourly  3. Every 4-6 hours minimum:  Change oxygen saturation probe site  4. Every 4-6 hours:  If on nasal continuous positive airway pressure, respiratory therapy assess nares and determine need for appliance change or resting period. Outcome: Progressing     Problem: ABCDS Injury Assessment  Goal: Absence of physical injury  Outcome: Progressing     Problem: Confusion  Goal: Confusion, delirium, dementia, or psychosis is improved or at baseline  Description: INTERVENTIONS:  1. Assess for possible contributors to thought disturbance, including medications, impaired vision or hearing, underlying metabolic abnormalities, dehydration, psychiatric diagnoses, and notify attending LIP  2. [place] high risk fall precautions, as indicated  3.  Provide frequent short contacts to provide reality

## 2023-09-05 NOTE — PROGRESS NOTES
2045: Patient almost continuously trying to take gown off, remove lines and leads, and get out of bed. Redirection and reorientation ineffective. Patient not interactive with staff, and not answering any questions. RASS +3.    2047: NP contacted for PRN medication for restlessness, anxiety, and agitation. 2103: Repeat message sent to NP. Patient RASS +3    2130: Order placed by NP for 2mg Ativan IVP x1.    2135: Ativan 2mg IVP given. Patient RASS still +3.    2145: Patient now calm, resting quietly. No signs of distress. RASS -1.      Da Becker RN

## 2023-09-05 NOTE — PROGRESS NOTES
V2.0    [facility] [place]      Name:  Ilene Askew /Age/Sex: 1955  (77 y.o. female)   MRN & CSN:  [id number] & [id number] Encounter Date/Time: 2023 11:54 AM EDT   Location:  [id number]-0735/1302-01 PCP: Aysha Villalobos RN, NP     Attending:Pedro Abad MD       Hospital Day: 3    Date of Admission: 9/3/2023    Assessment and Recommendations     Interval history:    Ilene Askew is a 76 y.o. female with pmh of as below who presents with hematemesis. 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 Status post variceal , grade D reflux esophagitis noted, portal hypertensive gastropathy noted, no residual esophageal varices    -Continue IV octreotide, IV PPI. For 72 hours  -Maintain prophylactic IV ceftriaxone    -Acute blood loss anemia, secondary to above  -Hemoglobin at 7-8. Continue monitoring. Decompensated alcoholic liver cirrhosis with multiple complications  -Continue monitoring, replete. -GI following      Acute encephalopathy, likely toxic metabolic (hepatic encephalopathy, hypernatremia,)  -CT scan obtained today, in process  - ? Hepatic encephalopathy in the light of the ammonia level. Unable to take p.o. Lactulose consider palpation of diarrhea. Hyponatremia/hyperchloremia  Fluid change noted, continue monitoring      Hypomagnesemia  Repleted    History of DVT  -Continue holding Eliquis    Severe protein calorie malnutrition/cachectic  Nutritional support when able.     Chronic calcific pancreatitis                   Personally reviewed Lab Studies and Imaging      Discussed management of the case with bedside nurse    Drugs that require monitoring for toxicity include, octreotide infusion      Disposition :     Continue ICU care, awaiting mental status improvement      Diet:   Diet NPO Exceptions are: Ice Chips, Sips of Water with Meds    DVT Prophylaxis: SCDs in the light of the ongoing lactated ringers IV soln 75 mL/hr at 09/05/23 0841    sodium chloride      octreotide (SandoSTATIN) infusion 25 mcg/hr (09/05/23 0400)    pantoprazole 8 mg/hr (09/05/23 0400)    sodium chloride       PRN Meds: LORazepam, 1 mg, Q6H PRN  sodium chloride flush, 5-40 mL, PRN  sodium chloride, 25 mL, PRN  ondansetron, 4 mg, Q4H PRN  polyethylene glycol, 17 g, Daily PRN  acetaminophen, 650 mg, Q4H PRN   Or  acetaminophen, 650 mg, Q4H PRN  sodium chloride flush, 5-40 mL, PRN  sodium chloride, , PRN        Labs and Imaging   CTA ABDOMEN PELVIS W WO CONTRAST    Result Date: 9/3/2023  EXAMINATION: CTA OF THE ABDOMEN AND PELVIS WITH AND WITHOUT CONTRAST 9/3/2023 5:28 pm TECHNIQUE: CTA of the abdomen and pelvis was performed without and with the administration of intravenous contrast. Multiplanar reformatted images are provided for review. MIP images are provided for review. Automated exposure control, iterative reconstruction, and/or weight based adjustment of the mA/kV was utilized to reduce the radiation dose to as low as reasonably achievable. COMPARISON: 07/01/2023 HISTORY: ORDERING SYSTEM PROVIDED HISTORY: massive GI bleed TECHNOLOGIST PROVIDED HISTORY: Reason for exam:->massive GI bleed Reason for exam:->GI protocol Reason for Exam: massive GI bleed, GI protocol Hematemesis; Rectal Bleeding FINDINGS: Lower Chest: Emphysematous changes. No acute process. Small to moderate sized hiatal hernia. Organs: Mildly cirrhotic appearance of the liver. Mild splenomegaly. Diffuse parenchymal calcifications of the pancreas with dilation of the pancreatic duct. Adrenals, kidneys, and bile ducts are within normal limits. The gallbladder is within normal limits. Ureters are nondilated. The bladder is decompressed. GI/Bowel: Moderate colonic diverticulosis. Hyperdense material within the diverticulum its evaluation for acute bleed. There is no visualized evidence of acute arterial hemorrhage within the bowel.   The colonic walls

## 2023-09-05 NOTE — PROGRESS NOTES
09/03/23  1615   PROTIME 29.1*   INR 2.78*       Radiology Review:    CTA ABDOMEN PELVIS W WO CONTRAST   Preliminary Result   1. No evidence of acute arterial extravasation within the bowel. 2. Diffusely edematous appearance of the walls of the small bowel as well of   the colon, which could be related to third spacing or hypoperfusion/shock. The mesenteric arteries are patent. 3. Distended stomach. 4. Mild diverticulosis distal colon without definite evidence of acute   diverticulitis. 5. Cirrhotic liver with mild splenomegaly and a small amount of ascites. 6. Small to moderate sized hiatal hernia. 7. Chronic pancreatitis.    8. Moderate to severe compression deformity at T11, new from prior exam.   Mild interval progression of a compression deformity at L2.         CT HEAD WO CONTRAST    (Results Pending)           Jameson Gutierrez MD

## 2023-09-06 ENCOUNTER — APPOINTMENT (OUTPATIENT)
Dept: CT IMAGING | Age: 68
End: 2023-09-06
Payer: MEDICARE

## 2023-09-06 LAB
AMMONIA PLAS-SCNC: 46 UMOL/L (ref 11–51)
AMYLASE SERPL-CCNC: 36 U/L (ref 25–115)
ANION GAP SERPL CALCULATED.3IONS-SCNC: 11 MMOL/L (ref 3–16)
BASOPHILS # BLD: 0 K/UL (ref 0–0.2)
BASOPHILS NFR BLD: 0.2 %
BUN SERPL-MCNC: 23 MG/DL (ref 7–20)
CALCIUM SERPL-MCNC: 8 MG/DL (ref 8.3–10.6)
CHLORIDE SERPL-SCNC: 114 MMOL/L (ref 99–110)
CO2 SERPL-SCNC: 24 MMOL/L (ref 21–32)
CREAT SERPL-MCNC: 0.6 MG/DL (ref 0.6–1.2)
DEPRECATED RDW RBC AUTO: 17.8 % (ref 12.4–15.4)
EOSINOPHIL # BLD: 0.1 K/UL (ref 0–0.6)
EOSINOPHIL NFR BLD: 2.1 %
GFR SERPLBLD CREATININE-BSD FMLA CKD-EPI: >60 ML/MIN/{1.73_M2}
GLUCOSE SERPL-MCNC: 97 MG/DL (ref 70–99)
HCT VFR BLD AUTO: 22.2 % (ref 36–48)
HGB BLD-MCNC: 7.3 G/DL (ref 12–16)
INR PPP: 1.69 (ref 0.84–1.16)
LIPASE SERPL-CCNC: 26 U/L (ref 13–60)
LYMPHOCYTES # BLD: 0.6 K/UL (ref 1–5.1)
LYMPHOCYTES NFR BLD: 15.7 %
MAGNESIUM SERPL-MCNC: 2 MG/DL (ref 1.8–2.4)
MCH RBC QN AUTO: 28.7 PG (ref 26–34)
MCHC RBC AUTO-ENTMCNC: 33 G/DL (ref 31–36)
MCV RBC AUTO: 87.1 FL (ref 80–100)
MONOCYTES # BLD: 0.4 K/UL (ref 0–1.3)
MONOCYTES NFR BLD: 11 %
NEUTROPHILS # BLD: 2.5 K/UL (ref 1.7–7.7)
NEUTROPHILS NFR BLD: 71 %
PHOSPHATE SERPL-MCNC: 3.3 MG/DL (ref 2.5–4.9)
PLATELET # BLD AUTO: 61 K/UL (ref 135–450)
PMV BLD AUTO: 8.2 FL (ref 5–10.5)
POTASSIUM SERPL-SCNC: 3.6 MMOL/L (ref 3.5–5.1)
PROTHROMBIN TIME: 19.8 SEC (ref 11.5–14.8)
RBC # BLD AUTO: 2.55 M/UL (ref 4–5.2)
SODIUM SERPL-SCNC: 135 MMOL/L (ref 136–145)
SODIUM SERPL-SCNC: 149 MMOL/L (ref 136–145)
TSH SERPL DL<=0.005 MIU/L-ACNC: 0.01 UIU/ML (ref 0.27–4.2)
WBC # BLD AUTO: 3.5 K/UL (ref 4–11)

## 2023-09-06 PROCEDURE — 82150 ASSAY OF AMYLASE: CPT

## 2023-09-06 PROCEDURE — 2580000003 HC RX 258: Performed by: INTERNAL MEDICINE

## 2023-09-06 PROCEDURE — 6360000002 HC RX W HCPCS: Performed by: HOSPITALIST

## 2023-09-06 PROCEDURE — 2060000000 HC ICU INTERMEDIATE R&B

## 2023-09-06 PROCEDURE — 2580000003 HC RX 258: Performed by: HOSPITALIST

## 2023-09-06 PROCEDURE — 36592 COLLECT BLOOD FROM PICC: CPT

## 2023-09-06 PROCEDURE — 6360000002 HC RX W HCPCS: Performed by: INTERNAL MEDICINE

## 2023-09-06 PROCEDURE — 2580000003 HC RX 258: Performed by: ANESTHESIOLOGY

## 2023-09-06 PROCEDURE — 99232 SBSQ HOSP IP/OBS MODERATE 35: CPT | Performed by: INTERNAL MEDICINE

## 2023-09-06 PROCEDURE — 85025 COMPLETE CBC W/AUTO DIFF WBC: CPT

## 2023-09-06 PROCEDURE — 94761 N-INVAS EAR/PLS OXIMETRY MLT: CPT

## 2023-09-06 PROCEDURE — 2500000003 HC RX 250 WO HCPCS: Performed by: NURSE PRACTITIONER

## 2023-09-06 PROCEDURE — 84100 ASSAY OF PHOSPHORUS: CPT

## 2023-09-06 PROCEDURE — 85610 PROTHROMBIN TIME: CPT

## 2023-09-06 PROCEDURE — C9113 INJ PANTOPRAZOLE SODIUM, VIA: HCPCS | Performed by: INTERNAL MEDICINE

## 2023-09-06 PROCEDURE — 83735 ASSAY OF MAGNESIUM: CPT

## 2023-09-06 PROCEDURE — 83690 ASSAY OF LIPASE: CPT

## 2023-09-06 PROCEDURE — 2580000003 HC RX 258: Performed by: NURSE PRACTITIONER

## 2023-09-06 PROCEDURE — APPNB15 APP NON BILLABLE TIME 0-15 MINS: Performed by: NURSE PRACTITIONER

## 2023-09-06 PROCEDURE — 84443 ASSAY THYROID STIM HORMONE: CPT

## 2023-09-06 PROCEDURE — 84295 ASSAY OF SERUM SODIUM: CPT

## 2023-09-06 PROCEDURE — 80048 BASIC METABOLIC PNL TOTAL CA: CPT

## 2023-09-06 PROCEDURE — 2700000000 HC OXYGEN THERAPY PER DAY

## 2023-09-06 PROCEDURE — 82140 ASSAY OF AMMONIA: CPT

## 2023-09-06 PROCEDURE — 74176 CT ABD & PELVIS W/O CONTRAST: CPT

## 2023-09-06 RX ORDER — HALOPERIDOL 5 MG/ML
2 INJECTION INTRAMUSCULAR EVERY 6 HOURS
Status: DISCONTINUED | OUTPATIENT
Start: 2023-09-06 | End: 2023-09-07

## 2023-09-06 RX ORDER — HALOPERIDOL 5 MG/ML
2 INJECTION INTRAMUSCULAR EVERY 6 HOURS PRN
Status: DISCONTINUED | OUTPATIENT
Start: 2023-09-06 | End: 2023-09-21 | Stop reason: HOSPADM

## 2023-09-06 RX ORDER — CALCIUM GLUCONATE 20 MG/ML
1000 INJECTION, SOLUTION INTRAVENOUS ONCE
Status: COMPLETED | OUTPATIENT
Start: 2023-09-06 | End: 2023-09-06

## 2023-09-06 RX ORDER — SODIUM CHLORIDE 9 MG/ML
INJECTION, SOLUTION INTRAVENOUS CONTINUOUS
Status: DISCONTINUED | OUTPATIENT
Start: 2023-09-06 | End: 2023-09-07

## 2023-09-06 RX ORDER — MIDODRINE HYDROCHLORIDE 5 MG/1
5 TABLET ORAL
Status: DISCONTINUED | OUTPATIENT
Start: 2023-09-06 | End: 2023-09-21 | Stop reason: HOSPADM

## 2023-09-06 RX ORDER — LORAZEPAM 2 MG/ML
1 INJECTION INTRAMUSCULAR EVERY 4 HOURS PRN
Status: DISCONTINUED | OUTPATIENT
Start: 2023-09-06 | End: 2023-09-11

## 2023-09-06 RX ORDER — DEXTROSE MONOHYDRATE 50 MG/ML
INJECTION, SOLUTION INTRAVENOUS CONTINUOUS
Status: DISCONTINUED | OUTPATIENT
Start: 2023-09-06 | End: 2023-09-06

## 2023-09-06 RX ORDER — THIAMINE HYDROCHLORIDE 100 MG/ML
100 INJECTION, SOLUTION INTRAMUSCULAR; INTRAVENOUS DAILY
Status: DISCONTINUED | OUTPATIENT
Start: 2023-09-06 | End: 2023-09-20

## 2023-09-06 RX ADMIN — THIAMINE HYDROCHLORIDE 100 MG: 100 INJECTION, SOLUTION INTRAMUSCULAR; INTRAVENOUS at 08:11

## 2023-09-06 RX ADMIN — OCTREOTIDE ACETATE 25 MCG/HR: 500 INJECTION, SOLUTION INTRAVENOUS; SUBCUTANEOUS at 01:07

## 2023-09-06 RX ADMIN — OCTREOTIDE ACETATE 25 MCG/HR: 500 INJECTION, SOLUTION INTRAVENOUS; SUBCUTANEOUS at 21:49

## 2023-09-06 RX ADMIN — Medication 10 ML: at 22:52

## 2023-09-06 RX ADMIN — ZIPRASIDONE MESYLATE 10 MG: 20 INJECTION, POWDER, LYOPHILIZED, FOR SOLUTION INTRAMUSCULAR at 16:46

## 2023-09-06 RX ADMIN — Medication 10 ML: at 04:21

## 2023-09-06 RX ADMIN — SODIUM CHLORIDE: 9 INJECTION, SOLUTION INTRAVENOUS at 21:49

## 2023-09-06 RX ADMIN — HALOPERIDOL LACTATE 2 MG: 5 INJECTION, SOLUTION INTRAMUSCULAR at 22:52

## 2023-09-06 RX ADMIN — DEXTROSE MONOHYDRATE: 50 INJECTION, SOLUTION INTRAVENOUS at 08:19

## 2023-09-06 RX ADMIN — PANTOPRAZOLE SODIUM 8 MG/HR: 40 INJECTION, POWDER, FOR SOLUTION INTRAVENOUS at 12:11

## 2023-09-06 RX ADMIN — CEFTRIAXONE 1000 MG: 1 INJECTION, POWDER, FOR SOLUTION INTRAMUSCULAR; INTRAVENOUS at 10:03

## 2023-09-06 RX ADMIN — Medication 10 ML: at 08:12

## 2023-09-06 RX ADMIN — PANTOPRAZOLE SODIUM 8 MG/HR: 40 INJECTION, POWDER, FOR SOLUTION INTRAVENOUS at 01:49

## 2023-09-06 RX ADMIN — LORAZEPAM 1 MG: 2 INJECTION INTRAMUSCULAR; INTRAVENOUS at 19:38

## 2023-09-06 RX ADMIN — CALCIUM GLUCONATE 1000 MG: 20 INJECTION, SOLUTION INTRAVENOUS at 08:47

## 2023-09-06 RX ADMIN — SODIUM CHLORIDE: 9 INJECTION, SOLUTION INTRAVENOUS at 15:00

## 2023-09-06 RX ADMIN — HALOPERIDOL LACTATE 2 MG: 5 INJECTION, SOLUTION INTRAMUSCULAR at 15:55

## 2023-09-06 NOTE — CARE COORDINATION
09/06/23 0936   Service Assessment   Patient Orientation Unresponsive   Cognition Other (see comment)  (does not open eyes, does not follow commands)   History Provided By Child/Family  (son, Nakul Heath)   Primary Caregiver Self   Support Systems Children;Family Members  (Son Nakul Heath and her brother)   [address] is: Named in [address] [facility]   PCP Verified by CM Yes  (Dr Ramos Bianchi)   Last Visit to PCP Within last 3 months  (August, 2023)   Current Functional Level Assistance with the following:;Shopping; Other (see comment)  (Needs help with shopping and medical transportation and has it from her brother.)   Can patient return to prior living arrangement Other (see comment)  (Pt to dc to a new apartment that she has not seen yet. Elevator access)   Ability to make needs known: Poor   Family able to assist with home care needs: Yes   Would you like for me to discuss the discharge plan with any other family members/significant others, and if so, who? Yes   Financial Resources  Resources None   CM/SW Referral Other (see comment)  (Alcohol dependency)   Discharge Planning   Type of Residence Apartment   Living Arrangements Alone   Current Services Prior To Admission None   Potential Assistance Needed Other (Comment)  (Alcohol rehab)   DME Ordered? No   Potential Assistance Purchasing Medications No   Type of Home Care Services None   Patient expects to be discharged to: Apartment   One/Two Story Residence One story   History of falls? 0   Services At/After Discharge   Transition of Care Consult (CM Consult) N/A   [employer] Information Provided?  No   Mode of Transport at Discharge Other (see comment)  (family)

## 2023-09-06 NOTE — PLAN OF CARE
Problem: Confusion  Goal: Confusion, delirium, dementia, or psychosis is improved or at baseline  Outcome: Not Progressing  Effect of thought disturbance (confusion, delirium, dementia, or psychosis) are managed with adequate functional status:   Assess for contributors to thought disturbance, including medications, impaired vision or hearing, underlying metabolic abnormalities, dehydration, psychiatric diagnoses, notify [address] high risk fall precautions, as indicated   Provide frequent short contacts to provide reality reorientation, refocusing and direction   Decrease environmental stimuli, including noise as appropriate   Monitor and intervene to maintain adequate nutrition, hydration, elimination, sleep and activity   If unable to ensure safety without constant attention obtain sitter and review sitter guidelines with assigned personnel     Problem: Neurosensory - Adult  Goal: Achieves stable or improved neurological status  Outcome: Not Progressing  Achieves stable or improved neurological status: Assess for and report changes in neurological status     Problem: Respiratory - Adult  Goal: Achieves optimal ventilation and oxygenation  Outcome: Progressing  Achieves optimal ventilation and oxygenation:   Assess for changes in respiratory status   Assess for changes in mentation and behavior   Position to facilitate oxygenation and minimize respiratory effort   Oxygen supplementation based on oxygen saturation or arterial blood gases   Encourage broncho-pulmonary hygiene including cough, deep breathe, incentive spirometry   Assess and instruct to report shortness of breath or any respiratory difficulty     Problem: Cardiovascular - Adult  Goal: Maintains optimal cardiac output and hemodynamic stability  Outcome: Progressing  Maintains optimal cardiac output and hemodynamic stability:   Monitor blood pressure and heart rate   Monitor urine output and notify Licensed Independent Practitioner for values

## 2023-09-06 NOTE — PROGRESS NOTES
Patient kept complaining that she had to urinate despite the charles being patent. RN bladder scanned with a high volume of 61 mLs. RN observed distention to the abdomen and informed physician via secure chat.

## 2023-09-06 NOTE — PROGRESS NOTES
Pulmonary Progress Note    Date of Admission: 9/3/2023   LOS: 3 days       CC:  Chief Complaint   Patient presents with    Hematemesis     Vomiting dark blood that started last night    Rectal Bleeding     Dark blood started last night        Subjective:  Continues to be agitated          Assessment:        Hypovolemic shock secondary to upper GI bleed  Variceal/the Oscar lesion status post banding  Acute blood loss anemia  Liver cirrhosis  Esophagitis  Chronic pancreatitis  History of DVT on Eliquis  Metabolic encephalopathy    Plan: This note may have been transcribed using [facility]. Please disregard any translational errors. Hospital Day: 3     Altered mental status  Continues overnight. Likely secondary to alcohol withdrawal.  however, this is unsubstantiated. Currently on CIWA. Hyponatremia. Ammonia 58. On FL lactulose. Will repeat ammonia  No head CT. We will get a head CT to assess for CVA. D/w nurse. Ok to transfer to floor. Respiratory  1 L nasal cannula. Acute blood loss anemia secondary to varices status post banding  Banded 9/3  Hemoglobin appears to be stabilizing 7.3. BUN decreased to 23      Hypernatremia with hyperchloremia  lactated Ringer's at 75 changed to D5 this morning. Decreased 70 mL/hr per MedCalc calculator. RESULT SUMMARY:  67 mL/hr  Fluid rate to decrease Na by 0.5 mmol/L/hr with D5      INPUTS:  Sex --> 1 = Female  Age range --> 3 = Elderly  Weight --> 42 kg  Serum sodium --> 149 mEq/L  Fluid type --> 6 = 5% Dextrose in water (0 mmol/L Na)  Rate of sodium correction --> 0.5 mEq/L/hr        Protein calorie malnutrition  Present on admission with albumin of 2.4. Unable to feed at this time.         Nutrition  - Diet NPO Exceptions are: Ice Chips, Sips of Water with Meds  -   Intake/Output Summary (Last 24 hours) at 9/6/2023 5848  Last data filed at 9/6/2023 0651  Gross per 24 hour   Intake 3478.58 ml   Output 810 ml   Net 2668.58

## 2023-09-06 NOTE — PROGRESS NOTES
Late entry due to patient care. Had been restless, agitated, & pulling her hospital gown & monitoring cables, even w/ repeated redirection and reorientation, since the beginning of the shift. She gets angry at times when staff tries to get monitoring cables or her hospital gown out of her hand (from being grasped by her). PRN Lorazepam 1 mg IV was administered (see MAR). This RN will monitor for her response.     Electronically signed by Sammy Morrison RN on 9/5/2023 at 11:59 PM

## 2023-09-06 NOTE — PROGRESS NOTES
Late entry due to patient care. Shift assessment completed. She's agitated and restless in bed, w/ her eyes closed. She responded \"Yes\" when this RN asked if she's \"Racheal\". Other than this, she didn't answer any orientation questions that this RN asked her. There were a few times she uttered words like, \"no\" when she was being turned to her sides, and even said some swear words. She was reoriented and redirected, but no good response w/ this. Pt. is SR/ ST on the monitor, not in any respiratory distress, and w/ intact charles catheter. A  is at the bedside.      Electronically signed by Rob Santos RN on 9/6/2023 at 12:16 AM

## 2023-09-06 NOTE — PROGRESS NOTES
V2.0    [facility] [place]      Name:  Poppy Tesfaye /Age/Sex: 1955  (77 y.o. female)   MRN & CSN:  [id number] & [id number] Encounter Date/Time: 2023 11:54 AM EDT   Location:  [id number]/1302-01 PCP: Patrice Roy RN, NP     Attending:Pedro Duran MD       Hospital Day: 4    Date of Admission: 9/3/2023    Assessment and Recommendations     Interval history:    Poppy Tesfaye is a 76 y.o. female with pmh of as below who presents with hematemesis. 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 Status post variceal , grade D reflux esophagitis noted, portal hypertensive gastropathy noted, no residual esophageal varices    -Continue IV octreotide, IV PPI. Per gi recs  -Maintain prophylactic IV ceftriaxone    -Acute blood loss anemia, secondary to above  -Hemoglobin continues to be stable, continue monitoring   . Thrombocytopenia, multifactorial liver disease, alcohol use  Continue monitoring. Decompensated alcoholic liver cirrhosis with multiple complications  -Continue monitoring,  . -GI following      Acute encephalopathy, likely toxic metabolic (hepatic encephalopathy, hypernatremia,)  -CT scan obtained today, no acute finding  - ? Hepatic encephalopathy in the light of the ammonia level. Received lactulose enema  Hyponatremia is contributing.  -Haldol for severe agitation. Hyponatremia/hyperchloremia  Persistent, fluid changed to D5 continue monitoring. Hypomagnesemia  Repleted    History of DVT  -Continue holding Eliquis    Severe protein calorie malnutrition/cachectic  Nutritional support when able.     Chronic calcific pancreatitis    Low TSH  Check free T4    Discussed with bedside nurse, discussed with GI NP, continue monitoring mental status      Drugs that require monitoring for toxicity include, octreotide infusion        Disposition :     Continue to require ICU care, awaiting

## 2023-09-06 NOTE — PROGRESS NOTES
Late entry due to patient care. @ 2100 to 2125 - This RN explained to her that she'll be given an enema per the MD's order and she should try to retain the enema as long as she can. She had been restless and wouldn't follow commands. This RN and her PCA  assisted her to turn to her L side. This RN administered the Lactulose enema, as ordered (see MAR). Even this RN repeatedly reminded her to retain the enema, the enema just came-out right away (she didn't hold the enema). The return fluid is black-tinged and she had a small amount of tarry, soft, black BM while the enema was being done. Melyssa care, disposable pad change, and repositioning were completed after.     Electronically signed by Tushar Chou RN on 9/5/2023 at 11:53 PM

## 2023-09-06 NOTE — PROGRESS NOTES
4 Eyes Skin Assessment     NAME:  Abdulaziz Haque  YOB: 1955  MEDICAL RECORD NUMBER:  [id number]    The patient is being assessed for  Shift Handoff    I agree that at least one RN has performed a thorough Head to Toe Skin Assessment on the patient. ALL assessment sites listed below have been assessed. Areas assessed by both nurses:    Head, Face, Ears, Shoulders, Back, Chest, Arms, Elbows, Hands, Sacrum. Buttock, Coccyx, Ischium, Legs. Feet and Heels, and Under Medical Devices         Does the Patient have a Wound?  No noted wound(s)       Benjamin Prevention initiated by RN: Yes  Wound Care Orders initiated by RN: No    Pressure Injury (Stage 3,4, Unstageable, DTI, NWPT, and Complex wounds) if present, place Wound referral order by RN under : No    New Ostomies, if present place, Ostomy referral order under : No     Nurse 1 eSignature: Electronically signed by Miguel Conti RN on 9/6/23 at 7:32 AM EDT    **SHARE this note so that the co-signing nurse can place an eSignature**    Nurse 2 eSignature: Electronically signed by Venecia Sprague RN on 9/6/23 at 7:35 AM EDT

## 2023-09-06 NOTE — PLAN OF CARE
normal range   Assess for signs of decreased cardiac output   Administer fluid and/or volume expanders as ordered     Problem: Genitourinary - Adult  Goal: Urinary catheter remains patent  9/6/2023 0920 by Hao Jones RN  Outcome: Progressing  Flowsheets (Taken 9/6/2023 0800)  Urinary catheter remains patent:   Assess patency of urinary catheter   Irrigate catheter per Licensed Independent Practitioner order if indicated and notify Licensed Independent Practitioner if unable to irrigate  9/6/2023 0343 by Rosy Halsted, RN  Outcome: Progressing  Flowsheets (Taken 9/5/2023 2301)  Urinary catheter remains patent: Assess patency of urinary catheter     Problem: Metabolic/Fluid and Electrolytes - Adult  Goal: Electrolytes maintained within normal limits  9/6/2023 0920 by Hao Jones RN  Outcome: Progressing  Flowsheets (Taken 9/6/2023 0800)  Electrolytes maintained within normal limits:   Monitor labs and assess patient for signs and symptoms of electrolyte imbalances   Administer electrolyte replacement as ordered  9/6/2023 0343 by Rosy Halsted, RN  Outcome: Progressing  Flowsheets (Taken 9/5/2023 2301)  Electrolytes maintained within normal limits:   Monitor labs and assess patient for signs and symptoms of electrolyte imbalances   Administer electrolyte replacement as ordered   Monitor response to electrolyte replacements, including repeat lab results as appropriate     Problem: Hematologic - Adult  Goal: Maintains hematologic stability  9/6/2023 0920 by Hao Jones RN  Outcome: Progressing  Flowsheets (Taken 9/6/2023 0800)  Maintains hematologic stability:   Assess for signs and symptoms of bleeding or hemorrhage   Monitor labs for bleeding or clotting disorders  9/6/2023 0343 by Rosy Halsted, RN  Outcome: Progressing  Flowsheets (Taken 9/5/2023 2301)  Maintains hematologic stability:   Assess for signs and symptoms of bleeding or hemorrhage   Monitor labs for bleeding or clotting psychiatric diagnoses, notify [address] [facility] high risk fall precautions, as indicated  9/6/2023 0343 by Radha Fitzgerald RN  Outcome: Not Progressing  Flowsheets (Taken 9/5/2023 2301)  Effect of thought disturbance (confusion, delirium, dementia, or psychosis) are managed with adequate functional status:   Assess for contributors to thought disturbance, including medications, impaired vision or hearing, underlying metabolic abnormalities, dehydration, psychiatric diagnoses, notify [address] [facility] high risk fall precautions, as indicated   Provide frequent short contacts to provide reality reorientation, refocusing and direction   Decrease environmental stimuli, including noise as appropriate   Monitor and intervene to maintain adequate nutrition, hydration, elimination, sleep and activity   If unable to ensure safety without constant attention obtain sitter and review sitter guidelines with assigned personnel     Problem: Neurosensory - Adult  Goal: Achieves stable or improved neurological status  9/6/2023 0920 by Ash Mcmullen RN  Outcome: Progressing  Flowsheets (Taken 9/6/2023 0800)  Achieves stable or improved neurological status:   Assess for and report changes in neurological status   Initiate measures to prevent increased intracranial pressure  9/6/2023 0343 by Radha Fitzgerald RN  Outcome: Not Progressing  Flowsheets (Taken 9/5/2023 2301)  Achieves stable or improved neurological status: Assess for and report changes in neurological status

## 2023-09-06 NOTE — FLOWSHEET NOTE
Late entry due to patient care. 09/05/23 2044   Oxygen Therapy   SpO2 (!) 85 %   Pulse via Oximetry 105 beats per minute   O2 Device Nasal cannula   O2 Flow Rate (L/min) 1 L/min  (O2 increased to 3L/min)     Increased O2 support to 3 L/min per nasal cannula. This RN will wean O2, as tolerated.     Electronically signed by Collins Harvey RN on 9/5/2023 at 10:37 PM

## 2023-09-06 NOTE — PROGRESS NOTES
After this RN gave her Lorazepam 1 mg IV last night (see MAR), she was able to rest & was calm for about 4 hours, then she started to become more and more restless and fidgety from then on. She still has her eyes almost always closed, but she responds w/ minimal verbal stimuli. She sporadically answers questions with a \"yeah\" or \"yes\" but will not follow commands nor answer any orientation questions.     Electronically signed by Rosamaria Diaz RN on 9/6/2023 at 7:00 AM

## 2023-09-06 NOTE — CARE COORDINATION
Chart reviewed. Following for Consideration of Rehab. Per nursing notes, pt responds minimally, eyes closed, does not follow commands. Following for DC disposition. [place], [place]     Case Management   [phone]    9/6/2023  9:29 AM    Call placed to son, Ravinder Draper for assessment. Case Management Assessment  Initial Evaluation    Date/Time of Evaluation: 9/6/2023 9:34 AM  Assessment Completed by: AYANA Cardoza    If patient is discharged prior to next notation, then this note serves as note for discharge by case management. Patient Name: French Pace                   YOB: 1955  Diagnosis: Upper GI bleed [K92.2]                   Date / Time: 9/3/2023  3:40 PM    Patient Admission Status: Inpatient   Readmission Risk (Low < 19, Mod (19-27), High > 27): Readmission Risk Score: 23.7    Current PCP: Concetta Huerta RN, NP  PCP verified by CM? Chart Reviewed: Yes      History Provided by:    Patient Orientation:      Patient Cognition:      Hospitalization in the last 30 days (Readmission):  No    If yes, Readmission Assessment in CM Navigator will be completed. Advance Directives:      Code Status: Full Code   Patient's Primary Decision Maker is:      Primary Decision MakeBrett Haque Child - [phone]    Discharge Planning:    Patient lives with: Children Type of Home: House  Primary Care Giver:    Patient Support Systems include: Children, Family Members   Current Financial resources:    Current community resources:    Current services prior to admission: None            Current DME:              Type of Home Care services:  None    ADLS  Prior functional level:    Current functional level:      PT AM-PAC:   /24  OT AM-PAC:   /24    Family can provide assistance at DC: Would you like Case Management to discuss the discharge plan with any other family members/significant others, and if so, who?     Plans to Return to Present Housing:    Other Identified Issues/Barriers to RETURNING to current housing: Will be discharging to a new apt without any steps. Potential Assistance needed at discharge: N/A            Potential DME:    Patient expects to discharge to: [address] [place] [address] for transportation at discharge: brother/family     Financial    Payor: Kendy Cutler / Plan: MEDICARE PART A AND B / Product Type: *No Product type* /     Does insurance require precert for SNF: No    Potential assistance Purchasing Medications:    Meds-to-Beds request: Yes       [facility] [phone] [place] [address]  Phone: [phone] Fax: [address] [place] [address] [address] [phone] - f [phone]  [facility] [address]  Phone: [phone] Fax: [phone]      Notes:    Factors facilitating achievement of predicted outcomes: Family support    Barriers to discharge: Alcohol use    Additional Case Management Notes: Son reports pt is moving to a new apartment that she has not been to yet. There are no steps; only elevator. She lives alone, brother assists her with shopping, medical transportation. She has a wh walker at home, but does not use. She has pcp; last seen in August of this year. The Plan for Transition of Care is related to the following treatment goals of Upper GI bleed [P30.5]    IF APPLICABLE: The Patient and/or patient representative Rod Mejia and her family were provided with a choice of provider and agrees with the discharge plan. Freedom of choice list with basic dialogue that supports the patient's individualized plan of care/goals and shares the quality data associated with the providers was provided to:     Patient Representative Name:       The Patient and/or Patient Representative Agree with the Discharge Plan?       Sara Jamison, [address] [place] [address]  Case Management Department  Ph: [phone]

## 2023-09-06 NOTE — CONSULTS
[address] [facility]   [facility] [place]  [phone]  Nephrology Consult Note          Patient ID: Pati Hayes  Referring/ Physician: Rivera Holly MD      HPI/Summary:   Pati Hayes is being seen by nephrology for hypernatremia that corrected quickly. This is a 77 yo lady with h/o alcoholic cirrhosis who presented with emesis with coffee grounds. She is admitted to ICU. She is awake but lethargic and offers no complaints and unable to provide any history. She was initially put on LR but sodium stayed elevated at 149 and then was changed to d5 when sodium dropped to 135. She has now been put on normal saline. She has a soft but distended abdomen. No peripheral edema. She has a sitter at bedside. CT abdomen showed edematous bowel, distended stomach, cirrhotic liver with splenomegaly and small ascites. /73  On 6 L sating 93%  Has charles,  cc    Na 149 > 135  K 3.6   Bicarb 24  BUN 23  Cr 0.6  Albumin 2.4  Hgb 7.3  INR 1.69    Plan:   - sodium dropped from 149 to 135 today over a short time when fluids changed to D5.   - the risk of of cerebral edema from overcorrection of serum sodium in hypernatremic adults is theoretical and not a major clinical concern. - agree with NS at 75 cc/hr   - strict IO   - add midodrine 5 mg TID. She is at risk for LEEANN given her cirrhosis and GIB. #Hypernatremia  Na 149 > 135 , so corrected too quickly. D5 stopped and changed to NS. She presented with vomiting and AMS so likely free water deficit due dehydration    #Decompensated cirrhosis. GIB, low albumin and elevated iNR. Has portal hypertension. CR is ok but at risk for LEEANN  Add midodrine to raise MAP  On PPI and octreotide. #volume status  Small ascites , not fluid overloaded but risk of worsening ascites with crystalloid fluids with hypoalbuminemia.    Monitor volume status and strict IO         [facility] would like to thank you for the opportunity to serve this

## 2023-09-06 NOTE — PROGRESS NOTES
Bedside shift hand-off done w/ oncoming BENJA Turpin., who will assume pt. care. This RN handed-off the pt. in a stable condition.     Electronically signed by Niesha Pagan RN on 9/6/2023 at 7:37 AM

## 2023-09-06 NOTE — PROGRESS NOTES
Rapid sodium correction noted, change fluid to normal saline. Consult nephrology. Continue periodic sodium monitoring.

## 2023-09-07 ENCOUNTER — APPOINTMENT (OUTPATIENT)
Dept: GENERAL RADIOLOGY | Age: 68
End: 2023-09-07
Payer: MEDICARE

## 2023-09-07 LAB
AFP-TM SERPL-MCNC: 1.2 UG/L
ANION GAP SERPL CALCULATED.3IONS-SCNC: 14 MMOL/L (ref 3–16)
BACTERIA BLD CULT: NORMAL
BASOPHILS # BLD: 0 K/UL (ref 0–0.2)
BASOPHILS # BLD: 0 K/UL (ref 0–0.2)
BASOPHILS NFR BLD: 0.2 %
BASOPHILS NFR BLD: 0.2 %
BUN SERPL-MCNC: 22 MG/DL (ref 7–20)
CALCIUM SERPL-MCNC: 7.9 MG/DL (ref 8.3–10.6)
CHLORIDE SERPL-SCNC: 112 MMOL/L (ref 99–110)
CO2 SERPL-SCNC: 22 MMOL/L (ref 21–32)
CREAT SERPL-MCNC: 0.6 MG/DL (ref 0.6–1.2)
DEPRECATED RDW RBC AUTO: 17.9 % (ref 12.4–15.4)
DEPRECATED RDW RBC AUTO: 18.5 % (ref 12.4–15.4)
EOSINOPHIL # BLD: 0 K/UL (ref 0–0.6)
EOSINOPHIL # BLD: 0 K/UL (ref 0–0.6)
EOSINOPHIL NFR BLD: 0.9 %
EOSINOPHIL NFR BLD: 1 %
GFR SERPLBLD CREATININE-BSD FMLA CKD-EPI: >60 ML/MIN/{1.73_M2}
GLUCOSE SERPL-MCNC: 97 MG/DL (ref 70–99)
HCT VFR BLD AUTO: 21 % (ref 36–48)
HCT VFR BLD AUTO: 22.8 % (ref 36–48)
HGB BLD-MCNC: 7 G/DL (ref 12–16)
HGB BLD-MCNC: 7.4 G/DL (ref 12–16)
INR PPP: 1.65 (ref 0.84–1.16)
LACTATE BLDV-SCNC: 0.9 MMOL/L (ref 0.4–1.9)
LYMPHOCYTES # BLD: 0.5 K/UL (ref 1–5.1)
LYMPHOCYTES # BLD: 0.7 K/UL (ref 1–5.1)
LYMPHOCYTES NFR BLD: 12.4 %
LYMPHOCYTES NFR BLD: 15.8 %
MAGNESIUM SERPL-MCNC: 1.9 MG/DL (ref 1.8–2.4)
MCH RBC QN AUTO: 28.8 PG (ref 26–34)
MCH RBC QN AUTO: 29.5 PG (ref 26–34)
MCHC RBC AUTO-ENTMCNC: 32.4 G/DL (ref 31–36)
MCHC RBC AUTO-ENTMCNC: 33.4 G/DL (ref 31–36)
MCV RBC AUTO: 88.3 FL (ref 80–100)
MCV RBC AUTO: 88.8 FL (ref 80–100)
MONOCYTES # BLD: 0.4 K/UL (ref 0–1.3)
MONOCYTES # BLD: 0.5 K/UL (ref 0–1.3)
MONOCYTES NFR BLD: 10 %
MONOCYTES NFR BLD: 10.9 %
NEUTROPHILS # BLD: 3 K/UL (ref 1.7–7.7)
NEUTROPHILS # BLD: 3 K/UL (ref 1.7–7.7)
NEUTROPHILS NFR BLD: 72.2 %
NEUTROPHILS NFR BLD: 76.4 %
PHOSPHATE SERPL-MCNC: 2.9 MG/DL (ref 2.5–4.9)
PLATELET # BLD AUTO: 47 K/UL (ref 135–450)
PLATELET # BLD AUTO: 50 K/UL (ref 135–450)
PMV BLD AUTO: 8.1 FL (ref 5–10.5)
PMV BLD AUTO: 8.3 FL (ref 5–10.5)
POTASSIUM SERPL-SCNC: 3.6 MMOL/L (ref 3.5–5.1)
PROTHROMBIN TIME: 19.5 SEC (ref 11.5–14.8)
RBC # BLD AUTO: 2.38 M/UL (ref 4–5.2)
RBC # BLD AUTO: 2.57 M/UL (ref 4–5.2)
REASON FOR REJECTION: NORMAL
REJECTED TEST: NORMAL
SODIUM SERPL-SCNC: 147 MMOL/L (ref 136–145)
SODIUM SERPL-SCNC: 148 MMOL/L (ref 136–145)
SODIUM SERPL-SCNC: 152 MMOL/L (ref 136–145)
WBC # BLD AUTO: 3.9 K/UL (ref 4–11)
WBC # BLD AUTO: 4.2 K/UL (ref 4–11)

## 2023-09-07 PROCEDURE — 2580000003 HC RX 258: Performed by: HOSPITALIST

## 2023-09-07 PROCEDURE — 87040 BLOOD CULTURE FOR BACTERIA: CPT

## 2023-09-07 PROCEDURE — C9113 INJ PANTOPRAZOLE SODIUM, VIA: HCPCS | Performed by: INTERNAL MEDICINE

## 2023-09-07 PROCEDURE — 6360000002 HC RX W HCPCS: Performed by: HOSPITALIST

## 2023-09-07 PROCEDURE — 84100 ASSAY OF PHOSPHORUS: CPT

## 2023-09-07 PROCEDURE — 2580000003 HC RX 258: Performed by: INTERNAL MEDICINE

## 2023-09-07 PROCEDURE — 85610 PROTHROMBIN TIME: CPT

## 2023-09-07 PROCEDURE — 84295 ASSAY OF SERUM SODIUM: CPT

## 2023-09-07 PROCEDURE — 2700000000 HC OXYGEN THERAPY PER DAY

## 2023-09-07 PROCEDURE — 6370000000 HC RX 637 (ALT 250 FOR IP): Performed by: INTERNAL MEDICINE

## 2023-09-07 PROCEDURE — 36415 COLL VENOUS BLD VENIPUNCTURE: CPT

## 2023-09-07 PROCEDURE — 83605 ASSAY OF LACTIC ACID: CPT

## 2023-09-07 PROCEDURE — 83735 ASSAY OF MAGNESIUM: CPT

## 2023-09-07 PROCEDURE — 80048 BASIC METABOLIC PNL TOTAL CA: CPT

## 2023-09-07 PROCEDURE — 71045 X-RAY EXAM CHEST 1 VIEW: CPT

## 2023-09-07 PROCEDURE — 6360000002 HC RX W HCPCS: Performed by: INTERNAL MEDICINE

## 2023-09-07 PROCEDURE — 2060000000 HC ICU INTERMEDIATE R&B

## 2023-09-07 PROCEDURE — 36592 COLLECT BLOOD FROM PICC: CPT

## 2023-09-07 PROCEDURE — 85025 COMPLETE CBC W/AUTO DIFF WBC: CPT

## 2023-09-07 PROCEDURE — 94761 N-INVAS EAR/PLS OXIMETRY MLT: CPT

## 2023-09-07 RX ORDER — CARVEDILOL 6.25 MG/1
6.25 TABLET ORAL 2 TIMES DAILY WITH MEALS
Status: DISCONTINUED | OUTPATIENT
Start: 2023-09-07 | End: 2023-09-21 | Stop reason: HOSPADM

## 2023-09-07 RX ADMIN — LACTULOSE 20 G: 20 SOLUTION ORAL at 13:01

## 2023-09-07 RX ADMIN — ZIPRASIDONE MESYLATE 20 MG: 20 INJECTION, POWDER, LYOPHILIZED, FOR SOLUTION INTRAMUSCULAR at 12:10

## 2023-09-07 RX ADMIN — LORAZEPAM 1 MG: 2 INJECTION INTRAMUSCULAR; INTRAVENOUS at 10:20

## 2023-09-07 RX ADMIN — CEFTRIAXONE 1000 MG: 1 INJECTION, POWDER, FOR SOLUTION INTRAMUSCULAR; INTRAVENOUS at 10:29

## 2023-09-07 RX ADMIN — CARVEDILOL 6.25 MG: 6.25 TABLET, FILM COATED ORAL at 17:51

## 2023-09-07 RX ADMIN — Medication 40 MG: at 13:01

## 2023-09-07 RX ADMIN — HALOPERIDOL LACTATE 2 MG: 5 INJECTION, SOLUTION INTRAMUSCULAR at 03:44

## 2023-09-07 RX ADMIN — HALOPERIDOL LACTATE 2 MG: 5 INJECTION, SOLUTION INTRAMUSCULAR at 09:14

## 2023-09-07 RX ADMIN — LACTULOSE 20 G: 20 SOLUTION ORAL at 20:25

## 2023-09-07 RX ADMIN — THIAMINE HYDROCHLORIDE 100 MG: 100 INJECTION, SOLUTION INTRAMUSCULAR; INTRAVENOUS at 12:10

## 2023-09-07 RX ADMIN — RIFAXIMIN 550 MG: 550 TABLET ORAL at 13:01

## 2023-09-07 RX ADMIN — LORAZEPAM 1 MG: 2 INJECTION INTRAMUSCULAR; INTRAVENOUS at 05:20

## 2023-09-07 RX ADMIN — RIFAXIMIN 550 MG: 550 TABLET ORAL at 20:25

## 2023-09-07 RX ADMIN — POTASSIUM CHLORIDE: 2 INJECTION, SOLUTION, CONCENTRATE INTRAVENOUS at 10:27

## 2023-09-07 ASSESSMENT — PAIN SCALES - GENERAL: PAINLEVEL_OUTOF10: 0

## 2023-09-07 NOTE — PROGRESS NOTES
V2.0    [facility] Progress Note      Name:  Lolis Dias /Age/Sex: 1955  (77 y.o. female)   MRN & CSN:  [id number] & [id number] Encounter Date/Time: 2023 11:54 AM EDT   Location:  [id number] PCP: Wily Foss RN, NP     Attending:Pedro Raymundo MD       Hospital Day: 5    Date of Admission: 9/3/2023    Assessment and Recommendations     Interval history:    Lolis Dias is a 76 y.o. female with pmh of as below who presents with hematemesis. 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 Status post variceal , grade D reflux esophagitis noted, portal hypertensive gastropathy noted, no residual esophageal varices    -Hemoglobin stable. Octreotide discontinued, continue IV PPI NAPOLEON. GI following.       -Acute blood loss anemia, secondary to above  Hemoglobin stable. Thrombocytopenia, multifactorial liver disease, alcohol use  Continue monitoring. Decompensated alcoholic liver cirrhosis with multiple complications  Reported increase distention of the abdomen noted  CT scan reviewed, ascites noted  Prophylactic ceftriaxone      Acute encephalopathy, likely toxic metabolic (hepatic encephalopathy, hypernatremia,)  -CT scan obtained today, no acute finding  - ? Hepatic encephalopathy in the light of the ammonia level.      -Suboptimal Tylenol response  Requires safety restraint, Geodon ordered. Hyponatremia/hyperchloremia  Noted that sodium is trending up. Management per nephrology. Hypomagnesemia  Repleted    History of DVT  -Continue holding Eliquis    Severe protein calorie malnutrition/cachectic  Nutritional support when able.     Chronic calcific pancreatitis    Low TSH  Check free T4    Discussed with bedside nurse, discussed with GI NP, continue monitoring mental status      Drugs that require monitoring for toxicity include, octreotide infusion        Disposition :     NG tube

## 2023-09-07 NOTE — PROGRESS NOTES
Late entry due to patient care. AM shift RN called to patient report to [address]. Pt. will be transferred to room 5275 after CT scan is done. PRN Lorazepam 1 mg IV was given @ 1938 due to patient's extreme restlessness and agitation (see MAR). The Lorazepam made her calmer, ready for ordered CT scan to be done. Her personal belongings are with her upon transport to CT Scan, via stretcher. This RN called his brother, Shreya Abarca, to inform him of pt.'s transfer to another room.     Electronically signed by Diogenes Kelly RN on 9/6/2023 at 9:59 PM

## 2023-09-07 NOTE — PROGRESS NOTES
4 Eyes Skin Assessment     NAME:  Tommy Sheldon  YOB: 1955  MEDICAL RECORD NUMBER:  [id number]    The patient is being assessed for  Transfer to New Unit    I agree that at least one RN has performed a thorough Head to Toe Skin Assessment on the patient. ALL assessment sites listed below have been assessed. Areas assessed by both nurses:    Head, Face, Ears, Shoulders, Back, Chest, Arms, Elbows, Hands, Sacrum. Buttock, Coccyx, Ischium, Legs. Feet and Heels, and Under Medical Devices         Does the Patient have a Wound?  No noted wound(s)       Benjamin Prevention initiated by RN: Yes  Wound Care Orders initiated by RN: No    Pressure Injury (Stage 3,4, Unstageable, DTI, NWPT, and Complex wounds) if present, place Wound referral order by RN under : No    New Ostomies, if present place, Ostomy referral order under : No     Nurse 1 eSignature: Electronically signed by Jazmine Chaney RN on 9/7/23 at 3:08 AM EDT    **SHARE this note so that the co-signing nurse can place an eSignature**    Nurse 2 eSignature: Electronically signed by Km Ortega RN on 9/7/23 at 3:59 AM EDT

## 2023-09-07 NOTE — CARE COORDINATION
SW attempted to meet with patient this AM, to complete ACP screening, was not able to, as patient was given Haldol, and is increased agitation pulling out cords and IV's. Pt is expected to get a NG tube insertion today. Not able to assess ACP at this time, but from previous admission in July 1st 2023, pt was a full code, yes to ventilation, breathing machine and CPR. SW was also consulted at this time for consideration of rehab, SW was not able to assess, and did attempt to call son to confirm, left VM. But from initial assessment done with son, pt is moving into a new apartment that son Rivas Cormier had gotten her into. SW marked consult complete at this time, as not applicable due to cognitive status and dc plan.      VIRGINIE will follow,     Danial Hi LMSW, [address] [place] [address] Social Work Case Management   Phone: [phone]  Fax: [phone]

## 2023-09-07 NOTE — PROGRESS NOTES
[address] [facility]   [facility]  [phone]  Nephrology Consult Note          Patient ID: Samantha Hodge  Referring/ Physician: Norah Smith MD      HPI/Summary:   Samantha Hodge is being seen by nephrology for hypernatremia that corrected quickly. This is a 77 yo lady with h/o alcoholic cirrhosis who presented with emesis with coffee grounds. She is admitted to ICU. She is awake but lethargic and offers no complaints and unable to provide any history. She was initially put on LR but sodium stayed elevated at 149 and then was changed to d5 when sodium dropped to 135. She has now been put on normal saline. She has a soft but distended abdomen. No peripheral edema. She has a sitter at bedside. CT abdomen showed edematous bowel, distended stomach, cirrhotic liver with splenomegaly and small ascites. Creatinine 0.6, sodium 135  Phosphorus 3.3 calcium low at 8 but corrects to low albumin  Sodium was high on presentation now on the low side fluids were switched to normal saline yesterday  Has alcoholic cirrhosis of the liver  Has tachycardia with pulse of 123, on oxygen 2 L/min was up to 5 L yesterday  Midodrine was added by Dr. Alejandro Mcmullen yesterday    Plan:   Sodium very high   Confused  Will switch fluids is D5W with potassium  Also K is on low side          #Hypernatremia  Na 149 > 135 , so corrected too quickly. D5 stopped and changed to NS. She presented with vomiting and AMS so likely free water deficit due dehydration    #Decompensated cirrhosis. GIB, low albumin and elevated iNR. Has portal hypertension. CR is ok but at risk for LEEANN  Add midodrine to raise MAP  On PPI and octreotide. #volume status  Small ascites , not fluid overloaded but risk of worsening ascites with crystalloid fluids with hypoalbuminemia. Monitor volume status and strict IO         [facility] Nephrology would like to thank you for the opportunity to serve this patient.  Please call with any questions or

## 2023-09-08 ENCOUNTER — APPOINTMENT (OUTPATIENT)
Dept: GENERAL RADIOLOGY | Age: 68
End: 2023-09-08
Payer: MEDICARE

## 2023-09-08 LAB
ANION GAP SERPL CALCULATED.3IONS-SCNC: 7 MMOL/L (ref 3–16)
BACTERIA BLD CULT ORG #2: NORMAL
BASOPHILS # BLD: 0 K/UL (ref 0–0.2)
BASOPHILS NFR BLD: 0.4 %
BUN SERPL-MCNC: 16 MG/DL (ref 7–20)
CALCIUM SERPL-MCNC: 7.9 MG/DL (ref 8.3–10.6)
CHLORIDE SERPL-SCNC: 110 MMOL/L (ref 99–110)
CO2 SERPL-SCNC: 28 MMOL/L (ref 21–32)
CREAT SERPL-MCNC: <0.5 MG/DL (ref 0.6–1.2)
DEPRECATED RDW RBC AUTO: 18.2 % (ref 12.4–15.4)
EOSINOPHIL # BLD: 0.1 K/UL (ref 0–0.6)
EOSINOPHIL NFR BLD: 1.9 %
GFR SERPLBLD CREATININE-BSD FMLA CKD-EPI: >60 ML/MIN/{1.73_M2}
GLUCOSE SERPL-MCNC: 157 MG/DL (ref 70–99)
HCT VFR BLD AUTO: 23.3 % (ref 36–48)
HGB BLD-MCNC: 7.5 G/DL (ref 12–16)
INR PPP: 1.74 (ref 0.84–1.16)
LYMPHOCYTES # BLD: 0.8 K/UL (ref 1–5.1)
LYMPHOCYTES NFR BLD: 10.9 %
MAGNESIUM SERPL-MCNC: 1.7 MG/DL (ref 1.8–2.4)
MCH RBC QN AUTO: 28.6 PG (ref 26–34)
MCHC RBC AUTO-ENTMCNC: 32.2 G/DL (ref 31–36)
MCV RBC AUTO: 88.7 FL (ref 80–100)
MONOCYTES # BLD: 0.8 K/UL (ref 0–1.3)
MONOCYTES NFR BLD: 11.7 %
NEUTROPHILS # BLD: 5.2 K/UL (ref 1.7–7.7)
NEUTROPHILS NFR BLD: 75.1 %
PHOSPHATE SERPL-MCNC: 2.9 MG/DL (ref 2.5–4.9)
PLATELET # BLD AUTO: 60 K/UL (ref 135–450)
PMV BLD AUTO: 8.4 FL (ref 5–10.5)
POTASSIUM SERPL-SCNC: 3.5 MMOL/L (ref 3.5–5.1)
PROTHROMBIN TIME: 20.3 SEC (ref 11.5–14.8)
RBC # BLD AUTO: 2.62 M/UL (ref 4–5.2)
SODIUM SERPL-SCNC: 142 MMOL/L (ref 136–145)
SODIUM SERPL-SCNC: 143 MMOL/L (ref 136–145)
SODIUM SERPL-SCNC: 145 MMOL/L (ref 136–145)
WBC # BLD AUTO: 6.9 K/UL (ref 4–11)

## 2023-09-08 PROCEDURE — 6360000002 HC RX W HCPCS: Performed by: HOSPITALIST

## 2023-09-08 PROCEDURE — 71045 X-RAY EXAM CHEST 1 VIEW: CPT

## 2023-09-08 PROCEDURE — 84100 ASSAY OF PHOSPHORUS: CPT

## 2023-09-08 PROCEDURE — 80048 BASIC METABOLIC PNL TOTAL CA: CPT

## 2023-09-08 PROCEDURE — 84295 ASSAY OF SERUM SODIUM: CPT

## 2023-09-08 PROCEDURE — 6360000002 HC RX W HCPCS: Performed by: REGISTERED NURSE

## 2023-09-08 PROCEDURE — 6360000002 HC RX W HCPCS: Performed by: INTERNAL MEDICINE

## 2023-09-08 PROCEDURE — 94640 AIRWAY INHALATION TREATMENT: CPT

## 2023-09-08 PROCEDURE — 82024 ASSAY OF ACTH: CPT

## 2023-09-08 PROCEDURE — 2580000003 HC RX 258: Performed by: INTERNAL MEDICINE

## 2023-09-08 PROCEDURE — 2700000000 HC OXYGEN THERAPY PER DAY

## 2023-09-08 PROCEDURE — 83735 ASSAY OF MAGNESIUM: CPT

## 2023-09-08 PROCEDURE — 6370000000 HC RX 637 (ALT 250 FOR IP): Performed by: INTERNAL MEDICINE

## 2023-09-08 PROCEDURE — C9113 INJ PANTOPRAZOLE SODIUM, VIA: HCPCS | Performed by: INTERNAL MEDICINE

## 2023-09-08 PROCEDURE — 94760 N-INVAS EAR/PLS OXIMETRY 1: CPT

## 2023-09-08 PROCEDURE — P9047 ALBUMIN (HUMAN), 25%, 50ML: HCPCS | Performed by: HOSPITALIST

## 2023-09-08 PROCEDURE — 2580000003 HC RX 258: Performed by: ANESTHESIOLOGY

## 2023-09-08 PROCEDURE — 85610 PROTHROMBIN TIME: CPT

## 2023-09-08 PROCEDURE — 2060000000 HC ICU INTERMEDIATE R&B

## 2023-09-08 PROCEDURE — 85025 COMPLETE CBC W/AUTO DIFF WBC: CPT

## 2023-09-08 RX ORDER — FUROSEMIDE 10 MG/ML
20 INJECTION INTRAMUSCULAR; INTRAVENOUS ONCE
Status: DISCONTINUED | OUTPATIENT
Start: 2023-09-08 | End: 2023-09-08

## 2023-09-08 RX ORDER — COSYNTROPIN 0.25 MG/ML
250 INJECTION, POWDER, FOR SOLUTION INTRAMUSCULAR; INTRAVENOUS ONCE
Status: COMPLETED | OUTPATIENT
Start: 2023-09-09 | End: 2023-09-09

## 2023-09-08 RX ORDER — ALBUMIN (HUMAN) 12.5 G/50ML
25 SOLUTION INTRAVENOUS ONCE
Status: COMPLETED | OUTPATIENT
Start: 2023-09-08 | End: 2023-09-08

## 2023-09-08 RX ORDER — ALBUTEROL SULFATE 2.5 MG/3ML
2.5 SOLUTION RESPIRATORY (INHALATION) EVERY 6 HOURS PRN
Status: DISCONTINUED | OUTPATIENT
Start: 2023-09-08 | End: 2023-09-12

## 2023-09-08 RX ADMIN — FOLIC ACID 1 MG: 1 TABLET ORAL at 08:58

## 2023-09-08 RX ADMIN — THERA TABS 1 TABLET: TAB at 08:58

## 2023-09-08 RX ADMIN — MIDODRINE HYDROCHLORIDE 5 MG: 5 TABLET ORAL at 08:58

## 2023-09-08 RX ADMIN — Medication 40 MG: at 08:57

## 2023-09-08 RX ADMIN — Medication 40 MG: at 22:56

## 2023-09-08 RX ADMIN — Medication 10 ML: at 22:56

## 2023-09-08 RX ADMIN — CEFTRIAXONE 1000 MG: 1 INJECTION, POWDER, FOR SOLUTION INTRAMUSCULAR; INTRAVENOUS at 09:49

## 2023-09-08 RX ADMIN — LACTULOSE 20 G: 20 SOLUTION ORAL at 08:58

## 2023-09-08 RX ADMIN — ALBUMIN (HUMAN) 25 G: 0.25 INJECTION, SOLUTION INTRAVENOUS at 13:43

## 2023-09-08 RX ADMIN — POTASSIUM CHLORIDE: 2 INJECTION, SOLUTION, CONCENTRATE INTRAVENOUS at 15:32

## 2023-09-08 RX ADMIN — Medication 10 ML: at 09:40

## 2023-09-08 RX ADMIN — IPRATROPIUM BROMIDE 0.5 MG: 0.5 SOLUTION RESPIRATORY (INHALATION) at 23:07

## 2023-09-08 RX ADMIN — MIDODRINE HYDROCHLORIDE 5 MG: 5 TABLET ORAL at 13:01

## 2023-09-08 RX ADMIN — THIAMINE HYDROCHLORIDE 100 MG: 100 INJECTION, SOLUTION INTRAMUSCULAR; INTRAVENOUS at 09:40

## 2023-09-08 RX ADMIN — CARVEDILOL 6.25 MG: 6.25 TABLET, FILM COATED ORAL at 08:58

## 2023-09-08 RX ADMIN — RIFAXIMIN 550 MG: 550 TABLET ORAL at 08:58

## 2023-09-08 ASSESSMENT — PAIN SCALES - GENERAL
PAINLEVEL_OUTOF10: 0
PAINLEVEL_OUTOF10: 0

## 2023-09-08 NOTE — PROGRESS NOTES
Pt 1:1 sitter removed r/t staffing. Bilateral soft wrist restraints remain in place for safety of lines. Skin integrity intact. Pt continues to meet criteria for restraints. Pt given incont. care and repositioning q2h. ROM exercises completed q2h. Pt non interactive this shift w/ minimal incomprehensible speech.

## 2023-09-08 NOTE — PLAN OF CARE
Problem: Pain  Goal: Verbalizes/displays adequate comfort level or baseline comfort level  Outcome: Progressing  Adult nonverbal pain scale zero, patient resting comfortably in bed. Problem: Safety - Adult  Goal: Free from fall injury  Outcome: Progressing   Bed alarm set    Problem: Skin/Tissue Integrity  Goal: Absence of new skin breakdown  Description: 1. Monitor for areas of redness and/or skin breakdown  2. Assess vascular access sites hourly  3. Every 4-6 hours minimum:  Change oxygen saturation probe site  4. Every 4-6 hours:  If on nasal continuous positive airway pressure, respiratory therapy assess nares and determine need for appliance change or resting period. Outcome: Progressing     Problem: ABCDS Injury Assessment  Goal: Absence of physical injury  Outcome: Progressing     Problem: Confusion  Goal: Confusion, delirium, dementia, or psychosis is improved or at baseline  Description: INTERVENTIONS:  1. Assess for possible contributors to thought disturbance, including medications, impaired vision or hearing, underlying metabolic abnormalities, dehydration, psychiatric diagnoses, and notify attending LIP  2. Deshler high risk fall precautions, as indicated  3. Provide frequent short contacts to provide reality reorientation, refocusing and direction  4. Decrease environmental stimuli, including noise as appropriate  5. Monitor and intervene to maintain adequate nutrition, hydration, elimination, sleep and activity  6. If unable to ensure safety without constant attention obtain sitter and review sitter guidelines with assigned personnel  7. Initiate Psychosocial CNS and Spiritual Care consult, as indicated  Outcome: Progressing     Problem: Safety - Medical Restraint  Goal: Remains free of injury from restraints (Restraint for Interference with Medical Device)  Description: INTERVENTIONS:  1.  Determine that other, less restrictive measures have been tried or would not be effective before applying the restraint  2. Evaluate the patient's condition at the time of restraint application  3. Inform patient/family regarding the reason for restraint  4.  Q2H: Monitor safety, psychosocial status, comfort, nutrition and hydration  Outcome: Progressing     Problem: Neurosensory - Adult  Goal: Achieves stable or improved neurological status  Outcome: Progressing     Problem: Respiratory - Adult  Goal: Achieves optimal ventilation and oxygenation  Outcome: Progressing     Problem: Cardiovascular - Adult  Goal: Maintains optimal cardiac output and hemodynamic stability  Outcome: Progressing     Problem: Genitourinary - Adult  Goal: Urinary catheter remains patent  Outcome: Progressing     Problem: Metabolic/Fluid and Electrolytes - Adult  Goal: Electrolytes maintained within normal limits  Outcome: Progressing     Problem: Hematologic - Adult  Goal: Maintains hematologic stability  Outcome: Progressing     Problem: Skin/Tissue Integrity - Adult  Goal: Skin integrity remains intact  Outcome: Progressing     Problem: Discharge Planning  Goal: Discharge to home or other facility with appropriate resources  Outcome: Progressing     Problem: Nutrition Deficit:  Goal: Optimize nutritional status  Outcome: Progressing

## 2023-09-08 NOTE — PROGRESS NOTES
acute process. Small to moderate sized hiatal hernia. Organs: Mildly cirrhotic appearance of the liver. Mild splenomegaly. Diffuse parenchymal calcifications of the pancreas with dilation of the pancreatic duct. Adrenals, kidneys, and bile ducts are within normal limits. The gallbladder is within normal limits. Ureters are nondilated. The bladder is decompressed. GI/Bowel: Moderate colonic diverticulosis. Hyperdense material within the diverticulum its evaluation for acute bleed. There is no visualized evidence of acute arterial hemorrhage within the bowel. The colonic walls are diffusely edematous in appearance primarily involving the ascending and transverse colon. There is also mild diffuse wall thickening involving the small bowel. No evidence of bowel obstruction. The appendix is within normal limits. The stomach is mildly distended. Pelvis: No adnexal mass. Peritoneum/Retroperitoneum: Small amount of ascites. No free air. No lymphadenopathy. Left central venous catheter tip in the IVC. Mild atherosclerosis. Patent abdominal vasculature. Bones/Soft Tissues: Interval progression of compression deformity at L2, now with moderate height loss. A moderate to severe compression deformity is present at T11, new from prior exam.  Compression deformities at T12, L1, L3, and L4 are unchanged. 1. No evidence of acute arterial extravasation within the bowel. 2. Diffusely edematous appearance of the walls of the small bowel as well of the colon, which could be related to third spacing or hypoperfusion/shock. The mesenteric arteries are patent. 3. Distended stomach. 4. Mild diverticulosis distal colon without definite evidence of acute diverticulitis. 5. Cirrhotic liver with mild splenomegaly and a small amount of ascites. 6. Small to moderate sized hiatal hernia. 7. Chronic pancreatitis.  8. Moderate to severe compression deformity at T11, new from prior exam. Mild interval progression of a compression hours 03/04/2019 07:09 PM     Blood Cultures:   Lab Results   Component Value Date/Time    [facility]  09/07/2023 11:07 AM     No Growth to date. Any change in status will be called. Lab Results   Component Value Date/Time    BLOODCULT2 No Growth after 4 days of incubation.  09/03/2023 09:30 PM     Organism: No results found for: [facility]      Electronically signed by Jenny Nieves MD on 9/8/2023 at 1:42 PM

## 2023-09-08 NOTE — PROGRESS NOTES
1000-Tube feeds started on patient per order. NGT flushing without difficulty. Patient repositioned, head of bed elevated 30 degrees. Continue to monitor. 1300-Patient's bp down to 84/50 manually. Patient remains lethargic, unable to assess for symptoms. Secure message sent to notify Dr. Rj Santamaria. Order received for IV Albumin. Dr. Rj Santamaria also notified patient has had 4 large liquid bm's during day today. Modified orders for Lactulose received. 1443-Albumin complete. Blood pressure improved at 107/82. Dr. Rj Santamaria notified. No new orders. 1445-Patient intermittently more alert during the day. At this time, patient able to clearly tell me her name, date of birth, and that she is in [place]. Patient then falls back asleep. Patient continues to attempt to pull at lines when restraints untied. Order received today to continue bilat wrist restraints. Continue to monitor. 1530-Saqib Santamaria notified unable to place Endocrinology consult. Per our unit secretaries, no Endocrinologists follow here. 1630-Patient pulled out NGT. Patient more alert at times, managed to sit face up to restrained hands and pull out NGT. NGT replaced. Dr. Rj Santamaria notified, order received for xray to confirm placement.

## 2023-09-08 NOTE — CONSULTS
Comprehensive Nutrition Assessment    Type and Reason for Visit:  Initial, Consult    Nutrition Recommendations/Plan:   Start TF per GI orders (regimen noted in bold below)  Monitor tolerance to enteral feeding     Malnutrition Assessment:  Malnutrition Status:  Severe malnutrition (09/08/23 0901)    Context:  Chronic Illness     Findings of the 6 clinical characteristics of malnutrition:  Energy Intake:  Unable to assess  Weight Loss:  Unable to assess (fluid shifts)     Body Fat Loss:  Severe body fat loss Orbital, Triceps   Muscle Mass Loss:  Severe muscle mass loss Temples (temporalis), Clavicles (pectoralis & deltoids), Scapula (trapezius), Hand (interosseous)  Fluid Accumulation:  Mild Extremities   Strength:  Not Performed    Nutrition Assessment:    Consult for tube feeding ordering and management. PMH includes; ETOH Cirrhosis, Chronic Pancreatitis, Esophageal Varices with multiple bandings, Esophageal Dilations. . Pt adm r/t hematemesis. Pt currently with altered mental status which MD does not believe to be from hepatic encephalopathy. Pt with evidence of muscle and fat wasting. Will order TF as per protocol, starting at a low rate, advancing very slowly to help avoid refeeding. Will continue to monitor progress. Nutrition Related Findings:    labs reviewed. Noted K+ replaced. Noted Mg down at 1.7. Noted BM on 9/8. Mary Christianson Noted pt is +8.1 liters. Wound Type: None       Current Nutrition Intake & Therapies:    Average Meal Intake: NPO  Average Supplements Intake: NPO  Diet NPO Exceptions are: Ice Chips, Sips of Water with Meds  ADULT TUBE FEEDING; Nasogastric; Standard with Fiber; Continuous; 20; Yes; 20; Q 8 hours; 55; 30; Q 4 hours  Current Tube Feeding (TF) Orders:  Feeding Route: Nasogastric  Formula: Standard with Fiber  Schedule: Continuous  Feeding Regimen: Jevity 1.5 formula to slowly advance toward goal rate of 55 ml per hour, monitoring for refeeding.  Rate adjusted for routine nursing care (~20

## 2023-09-08 NOTE — PLAN OF CARE
Problem: Pain  Goal: Verbalizes/displays adequate comfort level or baseline comfort level  9/7/2023 2133 by Bran Tom RN  Outcome: Progressing     Problem: Safety - Adult  Goal: Free from fall injury  9/7/2023 2133 by Bran Tom RN  Outcome: Progressing     Problem: Skin/Tissue Integrity  Goal: Absence of new skin breakdown  Description: 1. Monitor for areas of redness and/or skin breakdown  2. Assess vascular access sites hourly  3. Every 4-6 hours minimum:  Change oxygen saturation probe site  4. Every 4-6 hours:  If on nasal continuous positive airway pressure, respiratory therapy assess nares and determine need for appliance change or resting period.   9/7/2023 2133 by Bran Tom RN  Outcome: Progressing

## 2023-09-09 ENCOUNTER — APPOINTMENT (OUTPATIENT)
Dept: GENERAL RADIOLOGY | Age: 68
End: 2023-09-09
Payer: MEDICARE

## 2023-09-09 LAB
ANION GAP SERPL CALCULATED.3IONS-SCNC: 7 MMOL/L (ref 3–16)
ANISOCYTOSIS BLD QL SMEAR: ABNORMAL
BASE EXCESS BLDV CALC-SCNC: 1.2 MMOL/L
BASOPHILS # BLD: 0 K/UL (ref 0–0.2)
BASOPHILS NFR BLD: 0 %
BUN SERPL-MCNC: 14 MG/DL (ref 7–20)
CALCIUM SERPL-MCNC: 8.6 MG/DL (ref 8.3–10.6)
CHLORIDE SERPL-SCNC: 107 MMOL/L (ref 99–110)
CO2 BLDV-SCNC: 29 MMOL/L
CO2 SERPL-SCNC: 28 MMOL/L (ref 21–32)
COHGB MFR BLDV: 4.5 %
CORTIS SERPL-MCNC: 29.9 UG/DL
CORTIS SERPL-MCNC: 37.4 UG/DL
CORTIS SERPL-MCNC: 40.3 UG/DL
CREAT SERPL-MCNC: <0.5 MG/DL (ref 0.6–1.2)
DEPRECATED RDW RBC AUTO: 18.9 % (ref 12.4–15.4)
EOSINOPHIL # BLD: 0.2 K/UL (ref 0–0.6)
EOSINOPHIL NFR BLD: 2 %
GFR SERPLBLD CREATININE-BSD FMLA CKD-EPI: >60 ML/MIN/{1.73_M2}
GLUCOSE SERPL-MCNC: 149 MG/DL (ref 70–99)
HCO3 BLDV-SCNC: 27 MMOL/L (ref 23–29)
HCT VFR BLD AUTO: 23.9 % (ref 36–48)
HGB BLD-MCNC: 7.9 G/DL (ref 12–16)
LYMPHOCYTES # BLD: 0.8 K/UL (ref 1–5.1)
LYMPHOCYTES NFR BLD: 8 %
MACROCYTES BLD QL SMEAR: ABNORMAL
MCH RBC QN AUTO: 29.3 PG (ref 26–34)
MCHC RBC AUTO-ENTMCNC: 32.9 G/DL (ref 31–36)
MCV RBC AUTO: 89 FL (ref 80–100)
METHGB MFR BLDV: 0.8 %
MICROCYTES BLD QL SMEAR: ABNORMAL
MONOCYTES # BLD: 0.6 K/UL (ref 0–1.3)
MONOCYTES NFR BLD: 6 %
NEUTROPHILS # BLD: 8 K/UL (ref 1.7–7.7)
NEUTROPHILS NFR BLD: 84 %
O2 THERAPY: ABNORMAL
OVALOCYTES BLD QL SMEAR: ABNORMAL
PCO2 BLDV: 49.5 MMHG (ref 40–50)
PH BLDV: 7.35 [PH] (ref 7.35–7.45)
PHOSPHATE SERPL-MCNC: 2.6 MG/DL (ref 2.5–4.9)
PLATELET # BLD AUTO: 94 K/UL (ref 135–450)
PMV BLD AUTO: 8.5 FL (ref 5–10.5)
PO2 BLDV: 173 MMHG
POIKILOCYTOSIS BLD QL SMEAR: ABNORMAL
POLYCHROMASIA BLD QL SMEAR: ABNORMAL
POTASSIUM SERPL-SCNC: 3.7 MMOL/L (ref 3.5–5.1)
RBC # BLD AUTO: 2.69 M/UL (ref 4–5.2)
SAO2 % BLDV: 100 %
SODIUM SERPL-SCNC: 140 MMOL/L (ref 136–145)
SODIUM SERPL-SCNC: 142 MMOL/L (ref 136–145)
SODIUM SERPL-SCNC: 143 MMOL/L (ref 136–145)
WBC # BLD AUTO: 9.5 K/UL (ref 4–11)

## 2023-09-09 PROCEDURE — 6370000000 HC RX 637 (ALT 250 FOR IP): Performed by: INTERNAL MEDICINE

## 2023-09-09 PROCEDURE — 99223 1ST HOSP IP/OBS HIGH 75: CPT | Performed by: INTERNAL MEDICINE

## 2023-09-09 PROCEDURE — 36592 COLLECT BLOOD FROM PICC: CPT

## 2023-09-09 PROCEDURE — 2580000003 HC RX 258: Performed by: INTERNAL MEDICINE

## 2023-09-09 PROCEDURE — 84100 ASSAY OF PHOSPHORUS: CPT

## 2023-09-09 PROCEDURE — 84295 ASSAY OF SERUM SODIUM: CPT

## 2023-09-09 PROCEDURE — 85025 COMPLETE CBC W/AUTO DIFF WBC: CPT

## 2023-09-09 PROCEDURE — 2580000003 HC RX 258: Performed by: ANESTHESIOLOGY

## 2023-09-09 PROCEDURE — 6370000000 HC RX 637 (ALT 250 FOR IP): Performed by: HOSPITALIST

## 2023-09-09 PROCEDURE — 82533 TOTAL CORTISOL: CPT

## 2023-09-09 PROCEDURE — P9047 ALBUMIN (HUMAN), 25%, 50ML: HCPCS | Performed by: HOSPITALIST

## 2023-09-09 PROCEDURE — 94640 AIRWAY INHALATION TREATMENT: CPT

## 2023-09-09 PROCEDURE — 2060000000 HC ICU INTERMEDIATE R&B

## 2023-09-09 PROCEDURE — 6360000002 HC RX W HCPCS: Performed by: HOSPITALIST

## 2023-09-09 PROCEDURE — 2580000003 HC RX 258: Performed by: HOSPITALIST

## 2023-09-09 PROCEDURE — 80048 BASIC METABOLIC PNL TOTAL CA: CPT

## 2023-09-09 PROCEDURE — 94761 N-INVAS EAR/PLS OXIMETRY MLT: CPT

## 2023-09-09 PROCEDURE — C9113 INJ PANTOPRAZOLE SODIUM, VIA: HCPCS | Performed by: INTERNAL MEDICINE

## 2023-09-09 PROCEDURE — 82803 BLOOD GASES ANY COMBINATION: CPT

## 2023-09-09 PROCEDURE — 6360000002 HC RX W HCPCS: Performed by: INTERNAL MEDICINE

## 2023-09-09 PROCEDURE — 71045 X-RAY EXAM CHEST 1 VIEW: CPT

## 2023-09-09 PROCEDURE — 2700000000 HC OXYGEN THERAPY PER DAY

## 2023-09-09 PROCEDURE — 6370000000 HC RX 637 (ALT 250 FOR IP): Performed by: REGISTERED NURSE

## 2023-09-09 PROCEDURE — 6360000002 HC RX W HCPCS: Performed by: REGISTERED NURSE

## 2023-09-09 RX ORDER — 0.9 % SODIUM CHLORIDE 0.9 %
500 INTRAVENOUS SOLUTION INTRAVENOUS ONCE
Status: COMPLETED | OUTPATIENT
Start: 2023-09-09 | End: 2023-09-09

## 2023-09-09 RX ORDER — DEXAMETHASONE SODIUM PHOSPHATE 10 MG/ML
6 INJECTION, SOLUTION INTRAMUSCULAR; INTRAVENOUS ONCE
Status: COMPLETED | OUTPATIENT
Start: 2023-09-09 | End: 2023-09-09

## 2023-09-09 RX ORDER — ALBUMIN (HUMAN) 12.5 G/50ML
25 SOLUTION INTRAVENOUS ONCE
Status: COMPLETED | OUTPATIENT
Start: 2023-09-09 | End: 2023-09-09

## 2023-09-09 RX ORDER — FUROSEMIDE 10 MG/ML
20 INJECTION INTRAMUSCULAR; INTRAVENOUS ONCE
Status: DISCONTINUED | OUTPATIENT
Start: 2023-09-09 | End: 2023-09-09

## 2023-09-09 RX ADMIN — IPRATROPIUM BROMIDE 0.5 MG: 0.5 SOLUTION RESPIRATORY (INHALATION) at 16:03

## 2023-09-09 RX ADMIN — RIFAXIMIN 550 MG: 550 TABLET ORAL at 21:13

## 2023-09-09 RX ADMIN — SODIUM CHLORIDE 500 ML: 9 INJECTION, SOLUTION INTRAVENOUS at 15:56

## 2023-09-09 RX ADMIN — Medication 10 ML: at 21:14

## 2023-09-09 RX ADMIN — LACTULOSE 20 G: 20 SOLUTION ORAL at 13:26

## 2023-09-09 RX ADMIN — DEXAMETHASONE SODIUM PHOSPHATE 6 MG: 10 INJECTION, SOLUTION INTRAMUSCULAR; INTRAVENOUS at 13:18

## 2023-09-09 RX ADMIN — LACTULOSE 20 G: 20 SOLUTION ORAL at 21:13

## 2023-09-09 RX ADMIN — Medication 40 MG: at 21:12

## 2023-09-09 RX ADMIN — FOLIC ACID 1 MG: 1 TABLET ORAL at 11:26

## 2023-09-09 RX ADMIN — RIFAXIMIN 550 MG: 550 TABLET ORAL at 11:26

## 2023-09-09 RX ADMIN — THIAMINE HYDROCHLORIDE 100 MG: 100 INJECTION, SOLUTION INTRAMUSCULAR; INTRAVENOUS at 10:45

## 2023-09-09 RX ADMIN — BENZOCAINE AND MENTHOL 1 LOZENGE: 15; 3.6 LOZENGE ORAL at 22:33

## 2023-09-09 RX ADMIN — COSYNTROPIN 250 MCG: 0.25 INJECTION, POWDER, LYOPHILIZED, FOR SOLUTION INTRAVENOUS at 10:11

## 2023-09-09 RX ADMIN — MIDODRINE HYDROCHLORIDE 5 MG: 5 TABLET ORAL at 17:37

## 2023-09-09 RX ADMIN — MIDODRINE HYDROCHLORIDE 5 MG: 5 TABLET ORAL at 11:26

## 2023-09-09 RX ADMIN — IPRATROPIUM BROMIDE 0.5 MG: 0.5 SOLUTION RESPIRATORY (INHALATION) at 08:19

## 2023-09-09 RX ADMIN — Medication 10 ML: at 11:28

## 2023-09-09 RX ADMIN — ALBUMIN (HUMAN) 25 G: 0.25 INJECTION, SOLUTION INTRAVENOUS at 13:22

## 2023-09-09 RX ADMIN — IPRATROPIUM BROMIDE 0.5 MG: 0.5 SOLUTION RESPIRATORY (INHALATION) at 13:04

## 2023-09-09 RX ADMIN — THERA TABS 1 TABLET: TAB at 11:26

## 2023-09-09 RX ADMIN — Medication 40 MG: at 11:26

## 2023-09-09 RX ADMIN — IPRATROPIUM BROMIDE 0.5 MG: 0.5 SOLUTION RESPIRATORY (INHALATION) at 20:03

## 2023-09-09 ASSESSMENT — PAIN SCALES - GENERAL
PAINLEVEL_OUTOF10: 0

## 2023-09-09 NOTE — PROGRESS NOTES
This RN contacted Arnulfo Bucio NP via secure message regarding NG placement and patient's increased coughing and work of breathing. Awaiting response.  Electronically signed by Honey Lorenzo RN on 9/8/2023 at 11:41 PM

## 2023-09-09 NOTE — PROGRESS NOTES
Pt's BP dropped back down to 88/47 after 500mL bolus. Pt given midodrine via NG tube and recheck BP is 109/52. Pt has only had 125mLs out of catheter this shift. MD notified. Pt has been sleeping most of the day, however has been able to hold a few conversations this afternoon. She is lethargic off and on.

## 2023-09-09 NOTE — PROGRESS NOTES
Patient was able to lean herself forward and remove her nasogastric tube. Tube feed had not yet been restarted. No complications noted.  Electronically signed by Leo Méndez RN on 9/8/2023 at 8:42 PM

## 2023-09-09 NOTE — PROGRESS NOTES
Speech Therapy note regarding MD order for SLP Evaluation for Dysphagia     SLP attempt 1630 pm 9/9/2023  Pt in bed asleep with bilateral wrist restraints and NG tube feeding    No response to auditory stimuli  Brief eye opening to tactile and sensory stimulation (cold compress to face/eyes). Unable to sustain level of arousal for participation in Clinical Evaluation of Swallowing . Plan: Will re-attempt 9/11/2023  Discussed with RN who reports pt has been lethargic off an on all day    Signed  Maria A OrozcoMS,CCC,SLP [id number]  Speech and Language Pathologist  9/9/2023 at 1641 pm

## 2023-09-09 NOTE — PROGRESS NOTES
This RN alongside with BENJA Nunez replaced NG tube into the left nare with no resistance. Tube measured at 55 cm. Chest xray ordered. After placement pt had increased coughing and respiratory effort. NP made aware. Stats maintaining now in the low 90s.     Electronically signed by Mandy Cardona RN on 9/8/2023 at 10:51 PM

## 2023-09-09 NOTE — PROGRESS NOTES
Pt's NG tube placed this morning. Placement confirmed with xray. Tube feed started. BP 82/52. Scheduled midodrine given. MD notified.

## 2023-09-09 NOTE — PROGRESS NOTES
This RN reached out to [facility] NP by phone due to patient's increase in coughing and work or breathing and increase in O2 demands. [facility] NP said it was okay to remove NG and leave out till morning. RN will continue to monitor patient closely.

## 2023-09-09 NOTE — PROGRESS NOTES
RN spoke with radiologist Lilibeth Almonte MD, x ray resulted, NG was in the lung, RN notified Dr Roberto Baumann that the NG had already been removed.  Electronically signed by Sheryl Massey RN on 9/8/2023 at 11:50 PM

## 2023-09-09 NOTE — CONSULTS
REASON FOR CONSULTATION/CC: Pneumothorax      Consult at request of Francisco Ortiz MD for pneumothorax  PCP: Magdalena Irizarry RN, NP  Established Pulmonologist:  None    HISTORY OF PRESENT ILLNESS: Pete Pitt is a 76y.o. year old female with a history of  who presents with       Consulted for ptx after NG tube placement with pulmonary placement. Patient is confused. This note may have been  transcribed using [employer]. Please disregard any translational errors. Assessment:     Ptx       Plan:      Hospital Day 6     Ptx   Feeding tube placed with nurse to 30 cm then advanced after confirmed in midline. Ptx slightly bigger but not large enough for chest tube. Continue to monitor on o2. Follow up cxr in 6 hours ordered. EtOH cirrhosis with encephalopathy  GI signed off. Lactulose restarted with tube feed in place. Rifaximin                This note was transcribed using [employer]. Please disregard any translational errors.     Thank you for the consult    [facility]  [phone]             Data:     PAST MEDICAL HISTORY:  Past Medical History:   Diagnosis Date    Alcoholic cirrhosis of liver ([address])     w/ portal hypertension    Chronic pancreatitis ([address])     DVT (deep venous thrombosis) (HCC)     Esophageal varices (HCC)     Iron deficiency anemia     Reflux esophagitis        PAST SURGICAL HISTORY:  Past Surgical History:   Procedure Laterality Date    COLONOSCOPY N/A 3/8/2019    COLONOSCOPY POLYPECTOMY HOT snare with clip placed performed by Mariann Jones.,  at [address] [facility], SMALL BOWEL N/A 3/12/2019    ESOPHAGOGASTRODUODENOSCOPY WITH CAPSULE ENDOSCOPY DEPLOYMENT performed by Camila Mehta MD at [address] [place] [address] 3/6/2019    EGD W/EUS FNA performed by Camila Mehta MD at [address] [place] [address]

## 2023-09-09 NOTE — PROGRESS NOTES
V2.0    [facility] Progress Note      Name:  Franco Burger /Age/Sex: 1955  (77 y.o. female)   MRN & CSN:  [id number] & [id number] Encounter Date/Time: 2023 11:54 AM EDT   Location:  [id number] PCP: Ricky Rico, RN, NP     Attending:Pedro Yepez MD       Hospital Day: 7    Date of Admission: 9/3/2023    Assessment and Recommendations     Interval history:    Franco Burger is a 76 y.o. female with pmh of as below who presents with hematemesis. 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 Status post variceal , grade D reflux esophagitis noted, portal hypertensive gastropathy noted, no residual esophageal varices. Patient required blood transfusion intermittently for acute blood loss anemia, hemoglobin stabilized. Her she completed octreotide infusion, IV PPI infusion. Patient hospital course protracted secondary to acute encephalopathy      Upper GI bleeding  Resolved, as above.    -Acute blood loss anemia, secondary to above  Monitoring hemoglobin. Thrombocytopenia, multifactorial liver disease, alcohol use  Continue monitoring    Decompensated alcoholic liver cirrhosis with multiple complications  Reported increase distention of the abdomen noted  CT scan reviewed, ascites noted         Acute encephalopathy, likely toxic metabolic (hepatic encephalopathy, hypernatremia,)  -CT scan obtained today, no acute finding  - ? Hepatic encephalopathy in the light of the ammonia level.   ,     Continue rifaximin, diarrhea and abdominal lactulose. Continue thiamine  Obtain MRI of the brain when able, will try on Monday if patient is more cooperative. Continue to require safety restraints     Low TSH    free T4 low, ? Sick euthyroidism, central hypopituitarism,    -Cosyntropin test pending  -1 dose of Decadron.       aspiration pneumonitis, likely secondary to placement of chest tube in

## 2023-09-10 PROBLEM — J95.811 POSTPROCEDURAL PNEUMOTHORAX: Status: ACTIVE | Noted: 2023-09-10

## 2023-09-10 PROBLEM — J96.01 ACUTE RESPIRATORY FAILURE WITH HYPOXIA (HCC): Status: ACTIVE | Noted: 2023-09-10

## 2023-09-10 LAB
ABO + RH BLD: NORMAL
ANION GAP SERPL CALCULATED.3IONS-SCNC: 6 MMOL/L (ref 3–16)
BLD GP AB SCN SERPL QL: NORMAL
BLOOD BANK DISPENSE STATUS: NORMAL
BLOOD BANK PRODUCT CODE: NORMAL
BPU ID: NORMAL
BUN SERPL-MCNC: 16 MG/DL (ref 7–20)
CALCIUM SERPL-MCNC: 8.3 MG/DL (ref 8.3–10.6)
CHLORIDE SERPL-SCNC: 105 MMOL/L (ref 99–110)
CO2 SERPL-SCNC: 28 MMOL/L (ref 21–32)
CREAT SERPL-MCNC: <0.5 MG/DL (ref 0.6–1.2)
DEPRECATED RDW RBC AUTO: 19 % (ref 12.4–15.4)
DESCRIPTION BLOOD BANK: NORMAL
GFR SERPLBLD CREATININE-BSD FMLA CKD-EPI: >60 ML/MIN/{1.73_M2}
GLUCOSE BLD-MCNC: 329 MG/DL (ref 70–99)
GLUCOSE BLD-MCNC: 376 MG/DL (ref 70–99)
GLUCOSE BLD-MCNC: 386 MG/DL (ref 70–99)
GLUCOSE SERPL-MCNC: 423 MG/DL (ref 70–99)
HCT VFR BLD AUTO: 20.2 % (ref 36–48)
HCT VFR BLD AUTO: 25.7 % (ref 36–48)
HGB BLD-MCNC: 6.5 G/DL (ref 12–16)
HGB BLD-MCNC: 8.3 G/DL (ref 12–16)
MCH RBC QN AUTO: 28.7 PG (ref 26–34)
MCHC RBC AUTO-ENTMCNC: 32.3 G/DL (ref 31–36)
MCV RBC AUTO: 88.8 FL (ref 80–100)
PERFORMED ON: ABNORMAL
PLATELET # BLD AUTO: 58 K/UL (ref 135–450)
PMV BLD AUTO: 8.5 FL (ref 5–10.5)
POTASSIUM SERPL-SCNC: 3.5 MMOL/L (ref 3.5–5.1)
PROCALCITONIN SERPL IA-MCNC: 0.16 NG/ML (ref 0–0.15)
RBC # BLD AUTO: 2.27 M/UL (ref 4–5.2)
SODIUM SERPL-SCNC: 139 MMOL/L (ref 136–145)
WBC # BLD AUTO: 6.5 K/UL (ref 4–11)

## 2023-09-10 PROCEDURE — 6360000002 HC RX W HCPCS: Performed by: REGISTERED NURSE

## 2023-09-10 PROCEDURE — 94640 AIRWAY INHALATION TREATMENT: CPT

## 2023-09-10 PROCEDURE — 6370000000 HC RX 637 (ALT 250 FOR IP): Performed by: INTERNAL MEDICINE

## 2023-09-10 PROCEDURE — 88112 CYTOPATH CELL ENHANCE TECH: CPT

## 2023-09-10 PROCEDURE — 84145 PROCALCITONIN (PCT): CPT

## 2023-09-10 PROCEDURE — 99231 SBSQ HOSP IP/OBS SF/LOW 25: CPT | Performed by: NURSE PRACTITIONER

## 2023-09-10 PROCEDURE — 6370000000 HC RX 637 (ALT 250 FOR IP): Performed by: REGISTERED NURSE

## 2023-09-10 PROCEDURE — 85018 HEMOGLOBIN: CPT

## 2023-09-10 PROCEDURE — 86901 BLOOD TYPING SEROLOGIC RH(D): CPT

## 2023-09-10 PROCEDURE — 94761 N-INVAS EAR/PLS OXIMETRY MLT: CPT

## 2023-09-10 PROCEDURE — 86900 BLOOD TYPING SEROLOGIC ABO: CPT

## 2023-09-10 PROCEDURE — 6370000000 HC RX 637 (ALT 250 FOR IP): Performed by: HOSPITALIST

## 2023-09-10 PROCEDURE — 2060000000 HC ICU INTERMEDIATE R&B

## 2023-09-10 PROCEDURE — 86850 RBC ANTIBODY SCREEN: CPT

## 2023-09-10 PROCEDURE — 85014 HEMATOCRIT: CPT

## 2023-09-10 PROCEDURE — P9016 RBC LEUKOCYTES REDUCED: HCPCS

## 2023-09-10 PROCEDURE — C9113 INJ PANTOPRAZOLE SODIUM, VIA: HCPCS | Performed by: INTERNAL MEDICINE

## 2023-09-10 PROCEDURE — 2580000003 HC RX 258: Performed by: ANESTHESIOLOGY

## 2023-09-10 PROCEDURE — 6360000002 HC RX W HCPCS: Performed by: HOSPITALIST

## 2023-09-10 PROCEDURE — 2700000000 HC OXYGEN THERAPY PER DAY

## 2023-09-10 PROCEDURE — P9047 ALBUMIN (HUMAN), 25%, 50ML: HCPCS | Performed by: HOSPITALIST

## 2023-09-10 PROCEDURE — 89051 BODY FLUID CELL COUNT: CPT

## 2023-09-10 PROCEDURE — 6360000002 HC RX W HCPCS: Performed by: INTERNAL MEDICINE

## 2023-09-10 PROCEDURE — 86923 COMPATIBILITY TEST ELECTRIC: CPT

## 2023-09-10 PROCEDURE — 85027 COMPLETE CBC AUTOMATED: CPT

## 2023-09-10 PROCEDURE — 80048 BASIC METABOLIC PNL TOTAL CA: CPT

## 2023-09-10 PROCEDURE — 2580000003 HC RX 258: Performed by: INTERNAL MEDICINE

## 2023-09-10 PROCEDURE — 36430 TRANSFUSION BLD/BLD COMPNT: CPT

## 2023-09-10 PROCEDURE — 88305 TISSUE EXAM BY PATHOLOGIST: CPT

## 2023-09-10 RX ORDER — DEXTROSE MONOHYDRATE 100 MG/ML
INJECTION, SOLUTION INTRAVENOUS CONTINUOUS PRN
Status: DISCONTINUED | OUTPATIENT
Start: 2023-09-10 | End: 2023-09-12

## 2023-09-10 RX ORDER — ALBUMIN (HUMAN) 12.5 G/50ML
25 SOLUTION INTRAVENOUS ONCE
Status: COMPLETED | OUTPATIENT
Start: 2023-09-10 | End: 2023-09-10

## 2023-09-10 RX ORDER — SODIUM CHLORIDE 9 MG/ML
INJECTION, SOLUTION INTRAVENOUS PRN
Status: DISCONTINUED | OUTPATIENT
Start: 2023-09-10 | End: 2023-09-21 | Stop reason: HOSPADM

## 2023-09-10 RX ORDER — INSULIN LISPRO 100 [IU]/ML
0-4 INJECTION, SOLUTION INTRAVENOUS; SUBCUTANEOUS NIGHTLY
Status: DISCONTINUED | OUTPATIENT
Start: 2023-09-10 | End: 2023-09-19

## 2023-09-10 RX ORDER — FUROSEMIDE 10 MG/ML
40 INJECTION INTRAMUSCULAR; INTRAVENOUS ONCE
Status: COMPLETED | OUTPATIENT
Start: 2023-09-10 | End: 2023-09-10

## 2023-09-10 RX ORDER — INSULIN LISPRO 100 [IU]/ML
10 INJECTION, SOLUTION INTRAVENOUS; SUBCUTANEOUS ONCE
Status: COMPLETED | OUTPATIENT
Start: 2023-09-10 | End: 2023-09-10

## 2023-09-10 RX ORDER — INSULIN LISPRO 100 [IU]/ML
0-8 INJECTION, SOLUTION INTRAVENOUS; SUBCUTANEOUS
Status: DISCONTINUED | OUTPATIENT
Start: 2023-09-10 | End: 2023-09-19

## 2023-09-10 RX ADMIN — MIDODRINE HYDROCHLORIDE 5 MG: 5 TABLET ORAL at 10:15

## 2023-09-10 RX ADMIN — IPRATROPIUM BROMIDE 0.5 MG: 0.5 SOLUTION RESPIRATORY (INHALATION) at 16:26

## 2023-09-10 RX ADMIN — BENZOCAINE AND MENTHOL 1 LOZENGE: 15; 3.6 LOZENGE ORAL at 05:06

## 2023-09-10 RX ADMIN — INSULIN LISPRO 8 UNITS: 100 INJECTION, SOLUTION INTRAVENOUS; SUBCUTANEOUS at 18:32

## 2023-09-10 RX ADMIN — IPRATROPIUM BROMIDE 0.5 MG: 0.5 SOLUTION RESPIRATORY (INHALATION) at 08:15

## 2023-09-10 RX ADMIN — INSULIN LISPRO 8 UNITS: 100 INJECTION, SOLUTION INTRAVENOUS; SUBCUTANEOUS at 14:47

## 2023-09-10 RX ADMIN — INSULIN LISPRO 10 UNITS: 100 INJECTION, SOLUTION INTRAVENOUS; SUBCUTANEOUS at 21:41

## 2023-09-10 RX ADMIN — IPRATROPIUM BROMIDE 0.5 MG: 0.5 SOLUTION RESPIRATORY (INHALATION) at 11:40

## 2023-09-10 RX ADMIN — Medication 10 ML: at 10:20

## 2023-09-10 RX ADMIN — Medication 10 ML: at 21:43

## 2023-09-10 RX ADMIN — FOLIC ACID 1 MG: 1 TABLET ORAL at 10:15

## 2023-09-10 RX ADMIN — THERA TABS 1 TABLET: TAB at 10:15

## 2023-09-10 RX ADMIN — ACETAMINOPHEN 650 MG: 325 TABLET ORAL at 19:56

## 2023-09-10 RX ADMIN — Medication 40 MG: at 10:09

## 2023-09-10 RX ADMIN — IPRATROPIUM BROMIDE 0.5 MG: 0.5 SOLUTION RESPIRATORY (INHALATION) at 21:44

## 2023-09-10 RX ADMIN — Medication 40 MG: at 21:42

## 2023-09-10 RX ADMIN — FUROSEMIDE 40 MG: 10 INJECTION, SOLUTION INTRAMUSCULAR; INTRAVENOUS at 18:11

## 2023-09-10 RX ADMIN — THIAMINE HYDROCHLORIDE 100 MG: 100 INJECTION, SOLUTION INTRAMUSCULAR; INTRAVENOUS at 10:15

## 2023-09-10 RX ADMIN — LACTULOSE 20 G: 20 SOLUTION ORAL at 10:09

## 2023-09-10 RX ADMIN — RIFAXIMIN 550 MG: 550 TABLET ORAL at 10:16

## 2023-09-10 RX ADMIN — ALBUMIN (HUMAN) 25 G: 0.25 INJECTION, SOLUTION INTRAVENOUS at 18:16

## 2023-09-10 RX ADMIN — RIFAXIMIN 550 MG: 550 TABLET ORAL at 21:42

## 2023-09-10 ASSESSMENT — PAIN DESCRIPTION - LOCATION: LOCATION: HEAD

## 2023-09-10 ASSESSMENT — PAIN SCALES - GENERAL
PAINLEVEL_OUTOF10: 3
PAINLEVEL_OUTOF10: 0

## 2023-09-10 ASSESSMENT — PAIN DESCRIPTION - ORIENTATION: ORIENTATION: ANTERIOR

## 2023-09-10 ASSESSMENT — PAIN - FUNCTIONAL ASSESSMENT: PAIN_FUNCTIONAL_ASSESSMENT: PREVENTS OR INTERFERES SOME ACTIVE ACTIVITIES AND ADLS

## 2023-09-10 ASSESSMENT — PAIN DESCRIPTION - DESCRIPTORS: DESCRIPTORS: ACHING

## 2023-09-10 NOTE — PLAN OF CARE
Problem: Pain  Goal: Verbalizes/displays adequate comfort level or baseline comfort level  Outcome: Progressing  Flowsheets (Taken 9/9/2023 1937)  Verbalizes/displays adequate comfort level or baseline comfort level:   Encourage patient to monitor pain and request assistance   Assess pain using appropriate pain scale   Administer analgesics based on type and severity of pain and evaluate response   Implement non-pharmacological measures as appropriate and evaluate response  Note: Pain/discomfort being managed with PRN analgesics per MD orders. Pt able to express presence and absence of pain and rate pain appropriately using numerical scale. Problem: Safety - Adult  Goal: Free from fall injury  Outcome: Progressing  Note: Patient assessed for fall risk; fall precautions initiated. Patient instructed about safety devices. Environment kept free of clutter and adequate lighting provided. Bed locked and in lowest position. Call light within reach. Will continue to monitor. Problem: Skin/Tissue Integrity  Goal: Absence of new skin breakdown  Description: 1. Monitor for areas of redness and/or skin breakdown  2. Assess vascular access sites hourly  3. Every 4-6 hours minimum:  Change oxygen saturation probe site  4. Every 4-6 hours:  If on nasal continuous positive airway pressure, respiratory therapy assess nares and determine need for appliance change or resting period. Outcome: Progressing  Note: Skin assessment completed every shift. Pt assessed for incontinence, appropriate barrier cream applied prn. Pt encouraged to turn/rotate every 2 hours. Assistance provided if pt unable to do so themselves.          Problem: ABCDS Injury Assessment  Goal: Absence of physical injury  Outcome: Progressing  Flowsheets (Taken 9/9/2023 2340)  Absence of Physical Injury: Implement safety measures based on patient assessment     Problem: Confusion  Goal: Confusion, delirium, dementia, or psychosis is improved or at Hematologic - Adult  Goal: Maintains hematologic stability  Outcome: Progressing  Flowsheets (Taken 9/9/2023 1937)  Maintains hematologic stability: Assess for signs and symptoms of bleeding or hemorrhage     Problem: Skin/Tissue Integrity - Adult  Goal: Skin integrity remains intact  Outcome: Progressing  Flowsheets  Taken 9/9/2023 2339  Skin Integrity Remains Intact: Monitor for areas of redness and/or skin breakdown  Taken 9/9/2023 1937  Skin Integrity Remains Intact: Monitor for areas of redness and/or skin breakdown     Problem: Discharge Planning  Goal: Discharge to home or other facility with appropriate resources  Outcome: Progressing  Flowsheets (Taken 9/9/2023 1937)  Discharge to home or other facility with appropriate resources: Identify barriers to discharge with patient and caregiver     Problem: Nutrition Deficit:  Goal: Optimize nutritional status  Outcome: Progressing  Flowsheets (Taken 9/9/2023 2340)  Nutrient intake appropriate for improving, restoring, or maintaining nutritional needs: Recommend, monitor, and adjust tube feedings and TPN/PPN based on assessed needs

## 2023-09-10 NOTE — PLAN OF CARE
Pain/discomfort being managed with PRN analgesics per MD orders. Pt able to express presence and absence of pain and rate pain appropriately using numerical scale. Fall risk assessment completed every shift. All precautions in place. Pt has call light within reach at all times. Room clear of clutter. Pt aware to call for assistance when getting up. \Skin assessment completed every shift. Pt assessed for incontinence, appropriate barrier cream applied prn. Pt encouraged to turn/rotate every 2 hours. Assistance provided if pt unable to do so themselves. Intake/Output Summary (Last 24 hours) at 9/10/2023 0729  Last data filed at 9/10/2023 0601  Gross per 24 hour   Intake 1840.33 ml   Output 325 ml   Net 1515.33 ml     Vitals:    09/10/23 0601   BP:    Pulse: 85   Resp: 22   Temp:    SpO2: 92%     Patient assessed for fall risk; fall precautions initiated. Patient and family instructed about safety devices. Environment kept free of clutter and adequate lighting provided. Bed locked and in lowest position. Call light within reach. Will continue to monitor.

## 2023-09-10 NOTE — PROGRESS NOTES
Terrence Austin discharged to home accompanied by son.   Patient provided with the following educational materials upon discharge:  AVS.   Valuables and belongings sent with patient.   discharge summary, discharge instructions, medications and follow up appointments reviewed with patient and son.  Patient unable to verbalize understanding, son verbalized understanding.   No lymphadenopathy. Left central venous catheter tip in the IVC. Mild atherosclerosis. Patent abdominal vasculature. Bones/Soft Tissues: Interval progression of compression deformity at L2, now with moderate height loss. A moderate to severe compression deformity is present at T11, new from prior exam.  Compression deformities at T12, L1, L3, and L4 are unchanged. 1. No evidence of acute arterial extravasation within the bowel. 2. Diffusely edematous appearance of the walls of the small bowel as well of the colon, which could be related to third spacing or hypoperfusion/shock. The mesenteric arteries are patent. 3. Distended stomach. 4. Mild diverticulosis distal colon without definite evidence of acute diverticulitis. 5. Cirrhotic liver with mild splenomegaly and a small amount of ascites. 6. Small to moderate sized hiatal hernia. 7. Chronic pancreatitis. 8. Moderate to severe compression deformity at T11, new from prior exam. Mild interval progression of a compression deformity at L2. EGD    Result Date: 9/3/2023  No dictation       CBC:   Recent Labs     09/08/23  0500 09/09/23  0530 09/10/23  1020   WBC 6.9 9.5 6.5   HGB 7.5* 7.9* 6.5*   PLT 60* 94* 58*     BMP:    Recent Labs     09/08/23  0500 09/08/23  1233 09/09/23  0530 09/09/23  1041 09/10/23  1020      < > 142  143 140 139   K 3.5  --  3.7  --  3.5     --  107  --  105   CO2 28  --  28  --  28   BUN 16  --  14  --  16   CREATININE <0.5*  --  <0.5*  --  <0.5*   GLUCOSE 157*  --  149*  --  423*    < > = values in this interval not displayed. Hepatic:   No results for input(s): \"AST\", \"ALT\", \"ALB\", \"BILITOT\", \"ALKPHOS\" in the last 72 hours.     Lipids:   Lab Results   Component Value Date/Time    CHOL 54 07/02/2023 05:19 AM    HDL 25 07/02/2023 05:19 AM    TRIG 53 07/02/2023 05:19 AM     Hemoglobin A1C:   Lab Results   Component Value Date/Time    LABA1C 5.0 07/02/2023 05:19 AM     TSH:   Lab Results   Component Value Date/Time TSH 0.01 09/06/2023 04:20 AM     Troponin: No results found for: \"TROPONINT\"  Lactic Acid: No results for input(s): \"LACTA\" in the last 72 hours. BNP: No results for input(s): \"PROBNP\" in the last 72 hours. UA:  Lab Results   Component Value Date/Time    NITRU Negative 09/03/2023 05:51 PM    COLORU DARK YELLOW 09/03/2023 05:51 PM    PHUR 6.0 09/03/2023 05:51 PM    LABCAST 10-20 Hyaline 03/09/2019 04:00 PM    WBCUA 7 09/03/2023 05:51 PM    RBCUA 3 09/03/2023 05:51 PM    YEAST Present 03/09/2019 04:00 PM    BACTERIA None Seen 09/03/2023 05:51 PM    CLARITYU Clear 09/03/2023 05:51 PM    SPECGRAV 1.023 09/03/2023 05:51 PM    LEUKOCYTESUR TRACE 09/03/2023 05:51 PM    UROBILINOGEN 1.0 09/03/2023 05:51 PM    BILIRUBINUR MODERATE 09/03/2023 05:51 PM    BLOODU Negative 09/03/2023 05:51 PM    GLUCOSEU Negative 09/03/2023 05:51 PM    Sameul Else TRACE 09/03/2023 05:51 PM     Urine Cultures:   Lab Results   Component Value Date/Time    LABURIN No growth at 18-36 hours 03/04/2019 07:09 PM     Blood Cultures:   Lab Results   Component Value Date/Time    [facility]  09/07/2023 11:07 AM     No Growth to date. Any change in status will be called. Lab Results   Component Value Date/Time    BLOODCULT2  09/07/2023 12:33 PM     No Growth to date. Any change in status will be called.      Organism: No results found for: \"ORG\"      Electronically signed by Josef Smith MD on 9/10/2023 at 11:20 AM

## 2023-09-10 NOTE — CONSENT
Informed Consent for Blood Component Transfusion Note    I have discussed with the patient and brother the rationale for blood component transfusion; its benefits in treating or preventing fatigue, organ damage, or death; and its risk which includes mild transfusion reactions, rare risk of blood borne infection, or more serious but rare reactions. I have discussed the alternatives to transfusion, including the risk and consequences of not receiving transfusion. The patient and brother had an opportunity to ask questions and had agreed to proceed with transfusion of blood components.     Electronically signed by Gwen Smith MD on 9/10/23 at 11:17 AM EDT

## 2023-09-11 ENCOUNTER — APPOINTMENT (OUTPATIENT)
Dept: ULTRASOUND IMAGING | Age: 68
End: 2023-09-11
Payer: MEDICARE

## 2023-09-11 ENCOUNTER — APPOINTMENT (OUTPATIENT)
Dept: GENERAL RADIOLOGY | Age: 68
End: 2023-09-11
Payer: MEDICARE

## 2023-09-11 LAB
ALBUMIN SERPL-MCNC: 3.6 G/DL (ref 3.4–5)
ANION GAP SERPL CALCULATED.3IONS-SCNC: 6 MMOL/L (ref 3–16)
BACTERIA BLD CULT: NORMAL
BASE EXCESS BLDA CALC-SCNC: 7.2 MMOL/L (ref -3–3)
BUN SERPL-MCNC: 16 MG/DL (ref 7–20)
CALCIUM SERPL-MCNC: 8.3 MG/DL (ref 8.3–10.6)
CHLORIDE SERPL-SCNC: 103 MMOL/L (ref 99–110)
CO2 BLDA-SCNC: 36 MMOL/L
CO2 SERPL-SCNC: 33 MMOL/L (ref 21–32)
COHGB MFR BLDA: 1.8 % (ref 0–1.5)
CREAT SERPL-MCNC: <0.5 MG/DL (ref 0.6–1.2)
DEPRECATED RDW RBC AUTO: 18.7 % (ref 12.4–15.4)
GFR SERPLBLD CREATININE-BSD FMLA CKD-EPI: >60 ML/MIN/{1.73_M2}
GLUCOSE BLD-MCNC: 224 MG/DL (ref 70–99)
GLUCOSE BLD-MCNC: 243 MG/DL (ref 70–99)
GLUCOSE BLD-MCNC: 246 MG/DL (ref 70–99)
GLUCOSE BLD-MCNC: 255 MG/DL (ref 70–99)
GLUCOSE BLD-MCNC: 262 MG/DL (ref 70–99)
GLUCOSE SERPL-MCNC: 243 MG/DL (ref 70–99)
HCO3 BLDA-SCNC: 34 MMOL/L (ref 21–29)
HCT VFR BLD AUTO: 25.2 % (ref 36–48)
HGB BLD-MCNC: 8.2 G/DL (ref 12–16)
HGB BLDA-MCNC: 7.4 G/DL (ref 12–16)
MCH RBC QN AUTO: 28.5 PG (ref 26–34)
MCHC RBC AUTO-ENTMCNC: 32.7 G/DL (ref 31–36)
MCV RBC AUTO: 87.3 FL (ref 80–100)
METHGB MFR BLDA: 0.9 %
O2 THERAPY: ABNORMAL
PCO2 BLDA: 64.5 MMHG (ref 35–45)
PERFORMED ON: ABNORMAL
PH BLDA: 7.33 [PH] (ref 7.35–7.45)
PHOSPHATE SERPL-MCNC: 1.7 MG/DL (ref 2.5–4.9)
PLATELET # BLD AUTO: 58 K/UL (ref 135–450)
PMV BLD AUTO: 8.6 FL (ref 5–10.5)
PO2 BLDA: 226 MMHG (ref 75–108)
POTASSIUM SERPL-SCNC: 3.1 MMOL/L (ref 3.5–5.1)
POTASSIUM SERPL-SCNC: 3.6 MMOL/L (ref 3.5–5.1)
RBC # BLD AUTO: 2.88 M/UL (ref 4–5.2)
SAO2 % BLDA: >100 %
SODIUM SERPL-SCNC: 142 MMOL/L (ref 136–145)
WBC # BLD AUTO: 6.4 K/UL (ref 4–11)

## 2023-09-11 PROCEDURE — 2500000003 HC RX 250 WO HCPCS: Performed by: HOSPITALIST

## 2023-09-11 PROCEDURE — 6360000002 HC RX W HCPCS: Performed by: REGISTERED NURSE

## 2023-09-11 PROCEDURE — 36592 COLLECT BLOOD FROM PICC: CPT

## 2023-09-11 PROCEDURE — 6370000000 HC RX 637 (ALT 250 FOR IP): Performed by: INTERNAL MEDICINE

## 2023-09-11 PROCEDURE — 36600 WITHDRAWAL OF ARTERIAL BLOOD: CPT

## 2023-09-11 PROCEDURE — 6360000002 HC RX W HCPCS: Performed by: INTERNAL MEDICINE

## 2023-09-11 PROCEDURE — 6370000000 HC RX 637 (ALT 250 FOR IP): Performed by: HOSPITALIST

## 2023-09-11 PROCEDURE — 6360000002 HC RX W HCPCS: Performed by: HOSPITALIST

## 2023-09-11 PROCEDURE — 94640 AIRWAY INHALATION TREATMENT: CPT

## 2023-09-11 PROCEDURE — 2580000003 HC RX 258: Performed by: INTERNAL MEDICINE

## 2023-09-11 PROCEDURE — 84132 ASSAY OF SERUM POTASSIUM: CPT

## 2023-09-11 PROCEDURE — 6370000000 HC RX 637 (ALT 250 FOR IP): Performed by: REGISTERED NURSE

## 2023-09-11 PROCEDURE — 71045 X-RAY EXAM CHEST 1 VIEW: CPT

## 2023-09-11 PROCEDURE — 2580000003 HC RX 258: Performed by: HOSPITALIST

## 2023-09-11 PROCEDURE — 94761 N-INVAS EAR/PLS OXIMETRY MLT: CPT

## 2023-09-11 PROCEDURE — 2700000000 HC OXYGEN THERAPY PER DAY

## 2023-09-11 PROCEDURE — C9113 INJ PANTOPRAZOLE SODIUM, VIA: HCPCS | Performed by: INTERNAL MEDICINE

## 2023-09-11 PROCEDURE — 2060000000 HC ICU INTERMEDIATE R&B

## 2023-09-11 PROCEDURE — 82803 BLOOD GASES ANY COMBINATION: CPT

## 2023-09-11 PROCEDURE — 99233 SBSQ HOSP IP/OBS HIGH 50: CPT | Performed by: INTERNAL MEDICINE

## 2023-09-11 PROCEDURE — 2580000003 HC RX 258: Performed by: ANESTHESIOLOGY

## 2023-09-11 PROCEDURE — 85027 COMPLETE CBC AUTOMATED: CPT

## 2023-09-11 PROCEDURE — 80069 RENAL FUNCTION PANEL: CPT

## 2023-09-11 RX ORDER — INSULIN LISPRO 100 [IU]/ML
15 INJECTION, SOLUTION INTRAVENOUS; SUBCUTANEOUS ONCE
Status: COMPLETED | OUTPATIENT
Start: 2023-09-11 | End: 2023-09-11

## 2023-09-11 RX ORDER — FUROSEMIDE 10 MG/ML
40 INJECTION INTRAMUSCULAR; INTRAVENOUS 2 TIMES DAILY
Status: DISCONTINUED | OUTPATIENT
Start: 2023-09-11 | End: 2023-09-20

## 2023-09-11 RX ORDER — INSULIN LISPRO 100 [IU]/ML
0-16 INJECTION, SOLUTION INTRAVENOUS; SUBCUTANEOUS EVERY 4 HOURS
Status: CANCELLED | OUTPATIENT
Start: 2023-09-11

## 2023-09-11 RX ORDER — POTASSIUM CHLORIDE 29.8 MG/ML
20 INJECTION INTRAVENOUS PRN
Status: DISCONTINUED | OUTPATIENT
Start: 2023-09-11 | End: 2023-09-21 | Stop reason: HOSPADM

## 2023-09-11 RX ORDER — MORPHINE SULFATE 2 MG/ML
1 INJECTION, SOLUTION INTRAMUSCULAR; INTRAVENOUS EVERY 4 HOURS PRN
Status: DISCONTINUED | OUTPATIENT
Start: 2023-09-11 | End: 2023-09-13

## 2023-09-11 RX ORDER — POTASSIUM CHLORIDE 7.45 MG/ML
10 INJECTION INTRAVENOUS
Status: ACTIVE | OUTPATIENT
Start: 2023-09-11 | End: 2023-09-11

## 2023-09-11 RX ORDER — LORAZEPAM 2 MG/ML
0.25 INJECTION INTRAMUSCULAR EVERY 6 HOURS PRN
Status: DISCONTINUED | OUTPATIENT
Start: 2023-09-11 | End: 2023-09-21 | Stop reason: HOSPADM

## 2023-09-11 RX ADMIN — THIAMINE HYDROCHLORIDE 100 MG: 100 INJECTION, SOLUTION INTRAMUSCULAR; INTRAVENOUS at 09:04

## 2023-09-11 RX ADMIN — MORPHINE SULFATE 1 MG: 2 INJECTION, SOLUTION INTRAMUSCULAR; INTRAVENOUS at 19:32

## 2023-09-11 RX ADMIN — THERA TABS 1 TABLET: TAB at 09:03

## 2023-09-11 RX ADMIN — LACTULOSE 20 G: 20 SOLUTION ORAL at 14:16

## 2023-09-11 RX ADMIN — MIDODRINE HYDROCHLORIDE 5 MG: 5 TABLET ORAL at 09:03

## 2023-09-11 RX ADMIN — IPRATROPIUM BROMIDE 0.5 MG: 0.5 SOLUTION RESPIRATORY (INHALATION) at 08:04

## 2023-09-11 RX ADMIN — MORPHINE SULFATE 1 MG: 2 INJECTION, SOLUTION INTRAMUSCULAR; INTRAVENOUS at 23:29

## 2023-09-11 RX ADMIN — Medication 40 MG: at 22:34

## 2023-09-11 RX ADMIN — RIFAXIMIN 550 MG: 550 TABLET ORAL at 22:35

## 2023-09-11 RX ADMIN — INSULIN LISPRO 2 UNITS: 100 INJECTION, SOLUTION INTRAVENOUS; SUBCUTANEOUS at 17:47

## 2023-09-11 RX ADMIN — Medication 10 ML: at 09:05

## 2023-09-11 RX ADMIN — INSULIN LISPRO 2 UNITS: 100 INJECTION, SOLUTION INTRAVENOUS; SUBCUTANEOUS at 09:03

## 2023-09-11 RX ADMIN — MORPHINE SULFATE 1 MG: 2 INJECTION, SOLUTION INTRAMUSCULAR; INTRAVENOUS at 15:40

## 2023-09-11 RX ADMIN — Medication 40 MG: at 09:04

## 2023-09-11 RX ADMIN — PIPERACILLIN AND TAZOBACTAM 3375 MG: 3; .375 INJECTION, POWDER, LYOPHILIZED, FOR SOLUTION INTRAVENOUS at 23:06

## 2023-09-11 RX ADMIN — FUROSEMIDE 40 MG: 10 INJECTION, SOLUTION INTRAMUSCULAR; INTRAVENOUS at 17:32

## 2023-09-11 RX ADMIN — PIPERACILLIN AND TAZOBACTAM 4500 MG: 4; .5 INJECTION, POWDER, LYOPHILIZED, FOR SOLUTION INTRAVENOUS at 17:42

## 2023-09-11 RX ADMIN — ALBUTEROL SULFATE 2.5 MG: 2.5 SOLUTION RESPIRATORY (INHALATION) at 08:04

## 2023-09-11 RX ADMIN — Medication 10 ML: at 22:35

## 2023-09-11 RX ADMIN — POTASSIUM CHLORIDE 20 MEQ: 29.8 INJECTION, SOLUTION INTRAVENOUS at 09:54

## 2023-09-11 RX ADMIN — INSULIN LISPRO 15 UNITS: 100 INJECTION, SOLUTION INTRAVENOUS; SUBCUTANEOUS at 01:37

## 2023-09-11 RX ADMIN — ALBUTEROL SULFATE 2.5 MG: 2.5 SOLUTION RESPIRATORY (INHALATION) at 04:43

## 2023-09-11 RX ADMIN — INSULIN LISPRO 4 UNITS: 100 INJECTION, SOLUTION INTRAVENOUS; SUBCUTANEOUS at 13:09

## 2023-09-11 RX ADMIN — FOLIC ACID 1 MG: 1 TABLET ORAL at 09:05

## 2023-09-11 RX ADMIN — RIFAXIMIN 550 MG: 550 TABLET ORAL at 09:03

## 2023-09-11 RX ADMIN — LORAZEPAM 0.26 MG: 2 INJECTION INTRAMUSCULAR; INTRAVENOUS at 20:17

## 2023-09-11 RX ADMIN — IPRATROPIUM BROMIDE 0.5 MG: 0.5 SOLUTION RESPIRATORY (INHALATION) at 16:35

## 2023-09-11 RX ADMIN — LACTULOSE 20 G: 20 SOLUTION ORAL at 09:04

## 2023-09-11 RX ADMIN — FUROSEMIDE 40 MG: 10 INJECTION, SOLUTION INTRAMUSCULAR; INTRAVENOUS at 08:05

## 2023-09-11 RX ADMIN — LACTULOSE 20 G: 20 SOLUTION ORAL at 22:34

## 2023-09-11 RX ADMIN — POTASSIUM CHLORIDE 20 MEQ: 29.8 INJECTION, SOLUTION INTRAVENOUS at 08:14

## 2023-09-11 RX ADMIN — SODIUM PHOSPHATE, MONOBASIC, MONOHYDRATE AND SODIUM PHOSPHATE, DIBASIC, ANHYDROUS 8.22 MMOL: 142; 276 INJECTION, SOLUTION INTRAVENOUS at 14:21

## 2023-09-11 ASSESSMENT — PAIN SCALES - GENERAL
PAINLEVEL_OUTOF10: 0
PAINLEVEL_OUTOF10: 4

## 2023-09-11 NOTE — PLAN OF CARE
Problem: Pain  Goal: Verbalizes/displays adequate comfort level or baseline comfort level  Outcome: Progressing  Flowsheets (Taken 9/10/2023 2304)  Verbalizes/displays adequate comfort level or baseline comfort level:   Encourage patient to monitor pain and request assistance   Assess pain using appropriate pain scale   Administer analgesics based on type and severity of pain and evaluate response   Implement non-pharmacological measures as appropriate and evaluate response  Note: Pain/discomfort being managed with PRN analgesics per MD orders. Pt able to express presence and absence of pain and rate pain appropriately using numerical scale. Problem: Safety - Adult  Goal: Free from fall injury  Outcome: Progressing  Note: Patient assessed for fall risk; fall precautions initiated. Patient instructed about safety devices. Environment kept free of clutter and adequate lighting provided. Bed locked and in lowest position. Call light within reach. Will continue to monitor. Problem: Skin/Tissue Integrity  Goal: Absence of new skin breakdown  Description: 1. Monitor for areas of redness and/or skin breakdown  2. Assess vascular access sites hourly  3. Every 4-6 hours minimum:  Change oxygen saturation probe site  4. Every 4-6 hours:  If on nasal continuous positive airway pressure, respiratory therapy assess nares and determine need for appliance change or resting period. Outcome: Progressing  Note: Skin assessment completed every shift. Pt assessed for incontinence, appropriate barrier cream applied prn. Pt encouraged to turn/rotate every 2 hours. Assistance provided if pt unable to do so themselves.          Problem: ABCDS Injury Assessment  Goal: Absence of physical injury  Outcome: Progressing  Flowsheets (Taken 9/10/2023 2304)  Absence of Physical Injury: Implement safety measures based on patient assessment     Problem: Neurosensory - Adult  Goal: Achieves stable or improved neurological 9/10/2023 1943)  Maintains adequate nutritional intake: Monitor intake and output, weight and lab values     Problem: Discharge Planning  Goal: Discharge to home or other facility with appropriate resources  Outcome: Progressing  Flowsheets (Taken 9/10/2023 1943)  Discharge to home or other facility with appropriate resources: Identify barriers to discharge with patient and caregiver     Problem: Nutrition Deficit:  Goal: Optimize nutritional status  Outcome: Progressing  Flowsheets (Taken 9/10/2023 3643)  Nutrient intake appropriate for improving, restoring, or maintaining nutritional needs:   Recommend, monitor, and adjust tube feedings and TPN/PPN based on assessed needs   Monitor oral intake, labs, and treatment plans

## 2023-09-11 NOTE — PROGRESS NOTES
SLP ALL NOTES    Patient discussed with RN. Patient now with poor level of alertness for swallow assessment at this time. ST to hold swallow eval this date with plan to re-attempt at a later date pending status unless otherwise notified. Thank you. Astrid Jean, [address] [facility], [id number]  Speech-Language Pathologist  Portable phone: [phone]

## 2023-09-11 NOTE — PROGRESS NOTES
Called back to bed side secondary to worsening hypoxemia and refusing Venti mask. Having anxiety. RT and multiple nurses in room. All agree pt AOx3 and agrees she would rather die then have mask or to be intubated. Therefore she does not want CPR as well. Changed code status to DNR - CCA. Try vapotherma (RT in room and will get after treatment). Paperwork signed.

## 2023-09-11 NOTE — PROGRESS NOTES
SLP NOTE    Eval re-attempted. Patient discussed with RN. Plan to hold evaluation at this time d/t current status with plan to re-attempt later AM as recommended by RN. Thank you. Rolanda Copeland, [id number]  Speech-Language Pathologist  Portable phone: [phone]

## 2023-09-11 NOTE — CARE COORDINATION
SW completed chart review, Pt needing IVABs, feeding tube removed as patient pulled it out . Still confused, and poor level alertness. Awaiting clearances from nephro , pul and pallatiave. SW will follow.       Aurea Santillan LMSW, [address] [place] [address] Work Case Management   Phone: [phone]  Fax: [phone]

## 2023-09-11 NOTE — PROGRESS NOTES
[address] [facility]   [facility]  [phone]  Nephrology Consult Note          Patient ID: Elana Nelson  Referring/ Physician: Rony Caban MD      HPI/Summary:   Elana Nelson is being seen by nephrology for hypernatremia that corrected quickly. This is a 77 yo lady with h/o alcoholic cirrhosis who presented with emesis with coffee grounds. She is admitted to ICU. She is awake but lethargic and offers no complaints and unable to provide any history. She was initially put on LR but sodium stayed elevated at 149 and then was changed to d5 when sodium dropped to 135. She has now been put on normal saline. She has a soft but distended abdomen. No peripheral edema. She has a sitter at bedside. CT abdomen showed edematous bowel, distended stomach, cirrhotic liver with splenomegaly and small ascites      Sodium now normal at 142  Potassium 3.1  Creatinine is stable  Blood pressure 108/61  On 15 L oxygen      Plan:   Supplement potassium  Since she is NPO  Will order iv       #Hypernatremia  Na 149 > 135 , so corrected too quickly. D5 stopped and changed to NS. She presented with vomiting and AMS so likely free water deficit due dehydration    #Decompensated cirrhosis. GIB, low albumin and elevated iNR. Has portal hypertension. CR is ok but at risk for LEEANN  Add midodrine to raise MAP  On PPI and octreotide. #volume status  Small ascites , not fluid overloaded but risk of worsening ascites with crystalloid fluids with hypoalbuminemia. Monitor volume status and strict IO         [facility] would like to thank you for the opportunity to serve this patient. Please call with any questions or concerns.     Yuliya Deleon MD  [facility]  [facility], [address] [place] [address]  Fax: [phone]  Office: [phone]         CC/Reason for consult:   Reason for consult:   Chief Complaint   Patient presents with    Hematemesis     Vomiting dark blood that started last

## 2023-09-11 NOTE — PROGRESS NOTES
Patient frequently desating into the 70's on 8 Liters O2, RN notified Cassius Harper MD via secure message regarding patient's difficulty maintaining her O2 sats. Kourtney Naranjo called RN. orders for stat CXR. Patient placed on venti mask 50% on 6 liters O2.   Electronically signed by Diana Anderson RN on 9/11/2023 at 7:18 AM

## 2023-09-11 NOTE — PROGRESS NOTES
V2.0    [facility] [place]      Name:  Alpa Moore /Age/Sex: 1955  (77 y.o. female)   MRN & CSN:  [id number] & [id number] Encounter Date/Time: 2023 11:54 AM EDT   Location:  [id number]-8938/5275-01 PCP: Wong Shannon RN, NP     Attending:Pedro Alonso MD       Hospital Day: 9    Date of Admission: 9/3/2023    Assessment and Recommendations     Interval history:    Alpa Moore is a 76 y.o. female with pmh of as below who presents with hematemesis. 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 Status post variceal , grade D reflux esophagitis noted, portal hypertensive gastropathy noted, no residual esophageal varices. Patient required blood transfusion intermittently for acute blood loss anemia, hemoglobin stabilized. Her she completed octreotide infusion, IV PPI infusion. Patient hospital course protracted secondary to acute encephalopathy which subsequently improved but patient developed worsening respiratory failure, per the discussion with the pulmonary team, patient elected to have limited code. Patient contemplating hospice      Worsening acute hypoxic respiratory failure  -Repeated chest x-ray noted, hydropneumothorax.  -Patient is refusing BiPAP, intubation. Limited code per the discussion with pulmonary team.  -Component of volume overload, will give IV Lasix        aspiration pneumonitis, likely secondary to placement of chest tube in the right lung. I will initiate antibiotics in the light of the worsening respiratory failure    Pneumothorax  ? Related to chest tube placement in the right lung. Pulmonary following. Acute encephalopathy, likely toxic metabolic (hepatic encephalopathy, hypernatremia,)  -CT scan obtained today, no acute finding  - ?   Hepatic encephalopathy in the light of the ammonia level.   ,     Continue rifaximin, diarrhea and abdominal

## 2023-09-11 NOTE — PROGRESS NOTES
Pt once again pulled off heated high flow oxygen, sats dropped to 79% and heart rate 50.  Agreed to NRB sats to 100% and hert rate 85

## 2023-09-11 NOTE — PROGRESS NOTES
SLP NOTES    Evaluation re-attempted. Discussed with RN; pt now requiring non-rebreather. ST to re-attempt later in day as appropriate and as schedule permits unless otherwise notified. If SLP eval/tx is deemed no longer indicated as medical work-up progresses, please discontinue SLP eval/tx order. Thank you.   Pablo Manjarrez  [address]  Speech-Language Pathologist  Portable Phone: [phone]  09/11/23  10:32am

## 2023-09-11 NOTE — PROGRESS NOTES
Rick Wilson NOTE    MD contacted for hypoxia due to episodes of mild apnea and mouth breathing (also observed by this MD 2 nights ago) . Saturating 100% on NRB (RN could not find simple mask).     Chart reviewed    Repeat CXR ordered given known small pntx, to r/o interval increase  Transition to simple face mask    5:26am addendum: CXR not significantly changed (my read)              Jory Johns MD

## 2023-09-11 NOTE — PROGRESS NOTES
Oral TID    multivitamin  1 tablet Oral Daily    folic acid  1 mg Oral Daily    sodium chloride flush  5-40 mL IntraVENous 2 times per day       Continuous Infusions:   sodium chloride      dextrose      [Held by provider] potassium chloride 10 mEq in dextrose 5 % 1,000 mL infusion Stopped (09/09/23 0551)    sodium chloride      sodium chloride         PRN Meds:  sodium chloride, glucose, dextrose bolus **OR** dextrose bolus, glucagon (rDNA), dextrose, phenol, benzocaine-menthol, albuterol, haloperidol lactate, LORazepam, sodium chloride, ondansetron, polyethylene glycol, acetaminophen **OR** acetaminophen, sodium chloride flush, sodium chloride    Labs reviewed:  CBC:   Recent Labs     09/09/23  0530 09/10/23  1020 09/10/23  1800 09/11/23  0545   WBC 9.5 6.5  --  6.4   HGB 7.9* 6.5* 8.3* 8.2*   HCT 23.9* 20.2* 25.7* 25.2*   MCV 89.0 88.8  --  87.3   PLT 94* 58*  --  58*     BMP:   Recent Labs     09/09/23  0530 09/09/23  1041 09/10/23  1020 09/11/23  0545     143 140 139 142   K 3.7  --  3.5 3.1*     --  105 103   CO2 28  --  28 33*   PHOS 2.6  --   --  1.7*   BUN 14  --  16 16   CREATININE <0.5*  --  <0.5* <0.5*     LIVER PROFILE: No results for input(s): \"AST\", \"ALT\", \"LIPASE\", \"AMYLASE\", \"ALB\", \"BILIDIR\", \"BILITOT\", \"ALKPHOS\" in the last 72 hours. PT/INR: No results for input(s): \"PROTIME\", \"INR\" in the last 72 hours. APTT: No results for input(s): \"APTT\" in the last 72 hours. UA:No results for input(s): \"NITRITE\", \"COLORU\", \"PHUR\", \"LABCAST\", \"WBCUA\", \"RBCUA\", \"MUCUS\", \"TRICHOMONAS\", \"YEAST\", \"BACTERIA\", \"CLARITYU\", \"SPECGRAV\", \"LEUKOCYTESUR\", \"UROBILINOGEN\", \"BILIRUBINUR\", \"BLOODU\", \"GLUCOSEU\", \"AMORPHOUS\" in the last 72 hours. Invalid input(s): \"KETONESU\"  No results for input(s): \"PH\", \"PCO2\", \"PO2\" in the last 72 hours. Cx:      Films:  Radiology Review:  Pertinent images / reports were reviewed as a part of this visit.     CT Chest w/ contrast: No results found for this or any previous

## 2023-09-12 ENCOUNTER — APPOINTMENT (OUTPATIENT)
Dept: GENERAL RADIOLOGY | Age: 68
End: 2023-09-12
Payer: MEDICARE

## 2023-09-12 ENCOUNTER — APPOINTMENT (OUTPATIENT)
Dept: ULTRASOUND IMAGING | Age: 68
End: 2023-09-12
Payer: MEDICARE

## 2023-09-12 ENCOUNTER — APPOINTMENT (OUTPATIENT)
Dept: ULTRASOUND IMAGING | Age: 68
End: 2023-09-12
Attending: INTERNAL MEDICINE
Payer: MEDICARE

## 2023-09-12 LAB
ALBUMIN FLD-MCNC: 1.1 G/DL
ALBUMIN SERPL-MCNC: 2.9 G/DL (ref 3.4–5)
ANION GAP SERPL CALCULATED.3IONS-SCNC: 4 MMOL/L (ref 3–16)
APPEARANCE FLUID: NORMAL
BACTERIA BLD CULT ORG #2: NORMAL
BDY FLUID QUALITY: NORMAL
BUN SERPL-MCNC: 16 MG/DL (ref 7–20)
CALCIUM SERPL-MCNC: 7.8 MG/DL (ref 8.3–10.6)
CELL COUNT FLUID TYPE: NORMAL
CHLORIDE SERPL-SCNC: 97 MMOL/L (ref 99–110)
CO2 SERPL-SCNC: 44 MMOL/L (ref 21–32)
COLOR FLUID: YELLOW
CREAT SERPL-MCNC: <0.5 MG/DL (ref 0.6–1.2)
DEPRECATED RDW RBC AUTO: 18.1 % (ref 12.4–15.4)
EOSINOPHIL NFR FLD MANUAL: 1 %
GFR SERPLBLD CREATININE-BSD FMLA CKD-EPI: >60 ML/MIN/{1.73_M2}
GLUCOSE BLD-MCNC: 190 MG/DL (ref 70–99)
GLUCOSE BLD-MCNC: 265 MG/DL (ref 70–99)
GLUCOSE BLD-MCNC: 303 MG/DL (ref 70–99)
GLUCOSE BLD-MCNC: 308 MG/DL (ref 70–99)
GLUCOSE BLD-MCNC: 309 MG/DL (ref 70–99)
GLUCOSE BLD-MCNC: 347 MG/DL (ref 70–99)
GLUCOSE SERPL-MCNC: 352 MG/DL (ref 70–99)
HCT VFR BLD AUTO: 22.1 % (ref 36–48)
HGB BLD-MCNC: 7.3 G/DL (ref 12–16)
LYMPHOCYTES NFR FLD: 4 %
MACROPHAGES # FLD: 36 %
MCH RBC QN AUTO: 28.9 PG (ref 26–34)
MCHC RBC AUTO-ENTMCNC: 33.2 G/DL (ref 31–36)
MCV RBC AUTO: 86.9 FL (ref 80–100)
MESOTHL CELL NFR FLD: 1 %
MONOCYTES NFR FLD: 11 %
NEUTROPHIL, FLUID: 47 %
NUC CELL # FLD: 5844 /CUMM
PATH REV: YES
PERFORMED ON: ABNORMAL
PHOSPHATE SERPL-MCNC: 1.7 MG/DL (ref 2.5–4.9)
PLATELET # BLD AUTO: 40 K/UL (ref 135–450)
PMV BLD AUTO: 8.9 FL (ref 5–10.5)
POTASSIUM SERPL-SCNC: 3.5 MMOL/L (ref 3.5–5.1)
PROT FLD-MCNC: 1.8 G/DL
RBC # BLD AUTO: 2.54 M/UL (ref 4–5.2)
RBC FLUID: 7000 /CUMM
SODIUM SERPL-SCNC: 145 MMOL/L (ref 136–145)
SPECIMEN SOURCE FLD: NORMAL
TOTAL CELLS COUNTED FLD: 100
WBC # BLD AUTO: 3.8 K/UL (ref 4–11)

## 2023-09-12 PROCEDURE — 6360000002 HC RX W HCPCS: Performed by: HOSPITALIST

## 2023-09-12 PROCEDURE — 6370000000 HC RX 637 (ALT 250 FOR IP): Performed by: INTERNAL MEDICINE

## 2023-09-12 PROCEDURE — 71045 X-RAY EXAM CHEST 1 VIEW: CPT

## 2023-09-12 PROCEDURE — 82945 GLUCOSE OTHER FLUID: CPT

## 2023-09-12 PROCEDURE — 6360000002 HC RX W HCPCS: Performed by: REGISTERED NURSE

## 2023-09-12 PROCEDURE — 82042 OTHER SOURCE ALBUMIN QUAN EA: CPT

## 2023-09-12 PROCEDURE — C9113 INJ PANTOPRAZOLE SODIUM, VIA: HCPCS | Performed by: INTERNAL MEDICINE

## 2023-09-12 PROCEDURE — 94761 N-INVAS EAR/PLS OXIMETRY MLT: CPT

## 2023-09-12 PROCEDURE — 0W9930Z DRAINAGE OF RIGHT PLEURAL CAVITY WITH DRAINAGE DEVICE, PERCUTANEOUS APPROACH: ICD-10-PCS | Performed by: STUDENT IN AN ORGANIZED HEALTH CARE EDUCATION/TRAINING PROGRAM

## 2023-09-12 PROCEDURE — 76705 ECHO EXAM OF ABDOMEN: CPT

## 2023-09-12 PROCEDURE — 2700000000 HC OXYGEN THERAPY PER DAY

## 2023-09-12 PROCEDURE — 2709999900 US THORACENTESIS

## 2023-09-12 PROCEDURE — 99233 SBSQ HOSP IP/OBS HIGH 50: CPT | Performed by: INTERNAL MEDICINE

## 2023-09-12 PROCEDURE — 94640 AIRWAY INHALATION TREATMENT: CPT

## 2023-09-12 PROCEDURE — 87205 SMEAR GRAM STAIN: CPT

## 2023-09-12 PROCEDURE — 2580000003 HC RX 258: Performed by: ANESTHESIOLOGY

## 2023-09-12 PROCEDURE — 80069 RENAL FUNCTION PANEL: CPT

## 2023-09-12 PROCEDURE — 6360000002 HC RX W HCPCS: Performed by: INTERNAL MEDICINE

## 2023-09-12 PROCEDURE — 2060000000 HC ICU INTERMEDIATE R&B

## 2023-09-12 PROCEDURE — 83615 LACTATE (LD) (LDH) ENZYME: CPT

## 2023-09-12 PROCEDURE — 36592 COLLECT BLOOD FROM PICC: CPT

## 2023-09-12 PROCEDURE — 92610 EVALUATE SWALLOWING FUNCTION: CPT

## 2023-09-12 PROCEDURE — 2580000003 HC RX 258: Performed by: HOSPITALIST

## 2023-09-12 PROCEDURE — 2500000003 HC RX 250 WO HCPCS: Performed by: HOSPITALIST

## 2023-09-12 PROCEDURE — 2580000003 HC RX 258: Performed by: INTERNAL MEDICINE

## 2023-09-12 PROCEDURE — 85027 COMPLETE CBC AUTOMATED: CPT

## 2023-09-12 PROCEDURE — 84157 ASSAY OF PROTEIN OTHER: CPT

## 2023-09-12 PROCEDURE — 6370000000 HC RX 637 (ALT 250 FOR IP): Performed by: HOSPITALIST

## 2023-09-12 PROCEDURE — 87070 CULTURE OTHR SPECIMN AEROBIC: CPT

## 2023-09-12 RX ORDER — LORAZEPAM 2 MG/ML
0.25 INJECTION INTRAMUSCULAR ONCE
Status: COMPLETED | OUTPATIENT
Start: 2023-09-13 | End: 2023-09-13

## 2023-09-12 RX ORDER — INSULIN GLARGINE 100 [IU]/ML
5 INJECTION, SOLUTION SUBCUTANEOUS NIGHTLY
Status: DISCONTINUED | OUTPATIENT
Start: 2023-09-13 | End: 2023-09-19

## 2023-09-12 RX ORDER — DEXTROSE MONOHYDRATE 100 MG/ML
INJECTION, SOLUTION INTRAVENOUS CONTINUOUS PRN
Status: DISCONTINUED | OUTPATIENT
Start: 2023-09-12 | End: 2023-09-21 | Stop reason: HOSPADM

## 2023-09-12 RX ORDER — ALBUTEROL SULFATE 2.5 MG/3ML
2.5 SOLUTION RESPIRATORY (INHALATION) EVERY 4 HOURS PRN
Status: DISCONTINUED | OUTPATIENT
Start: 2023-09-13 | End: 2023-09-21 | Stop reason: HOSPADM

## 2023-09-12 RX ADMIN — IPRATROPIUM BROMIDE 0.5 MG: 0.5 SOLUTION RESPIRATORY (INHALATION) at 20:30

## 2023-09-12 RX ADMIN — IPRATROPIUM BROMIDE 0.5 MG: 0.5 SOLUTION RESPIRATORY (INHALATION) at 08:21

## 2023-09-12 RX ADMIN — THIAMINE HYDROCHLORIDE 100 MG: 100 INJECTION, SOLUTION INTRAMUSCULAR; INTRAVENOUS at 11:18

## 2023-09-12 RX ADMIN — FOLIC ACID 1 MG: 1 TABLET ORAL at 09:55

## 2023-09-12 RX ADMIN — PIPERACILLIN AND TAZOBACTAM 3375 MG: 3; .375 INJECTION, POWDER, LYOPHILIZED, FOR SOLUTION INTRAVENOUS at 05:53

## 2023-09-12 RX ADMIN — RIFAXIMIN 550 MG: 550 TABLET ORAL at 23:04

## 2023-09-12 RX ADMIN — MIDODRINE HYDROCHLORIDE 5 MG: 5 TABLET ORAL at 13:02

## 2023-09-12 RX ADMIN — FUROSEMIDE 40 MG: 10 INJECTION, SOLUTION INTRAMUSCULAR; INTRAVENOUS at 18:04

## 2023-09-12 RX ADMIN — IPRATROPIUM BROMIDE 0.5 MG: 0.5 SOLUTION RESPIRATORY (INHALATION) at 12:20

## 2023-09-12 RX ADMIN — MIDODRINE HYDROCHLORIDE 5 MG: 5 TABLET ORAL at 09:55

## 2023-09-12 RX ADMIN — INSULIN LISPRO 6 UNITS: 100 INJECTION, SOLUTION INTRAVENOUS; SUBCUTANEOUS at 09:55

## 2023-09-12 RX ADMIN — INSULIN LISPRO 4 UNITS: 100 INJECTION, SOLUTION INTRAVENOUS; SUBCUTANEOUS at 13:02

## 2023-09-12 RX ADMIN — LORAZEPAM 0.26 MG: 2 INJECTION INTRAMUSCULAR; INTRAVENOUS at 14:55

## 2023-09-12 RX ADMIN — FUROSEMIDE 40 MG: 10 INJECTION, SOLUTION INTRAMUSCULAR; INTRAVENOUS at 09:55

## 2023-09-12 RX ADMIN — INSULIN LISPRO 4 UNITS: 100 INJECTION, SOLUTION INTRAVENOUS; SUBCUTANEOUS at 23:03

## 2023-09-12 RX ADMIN — LORAZEPAM 0.26 MG: 2 INJECTION INTRAMUSCULAR; INTRAVENOUS at 21:41

## 2023-09-12 RX ADMIN — Medication 10 ML: at 23:04

## 2023-09-12 RX ADMIN — Medication 40 MG: at 23:02

## 2023-09-12 RX ADMIN — SODIUM PHOSPHATE, MONOBASIC, MONOHYDRATE AND SODIUM PHOSPHATE, DIBASIC, ANHYDROUS 8.22 MMOL: 142; 276 INJECTION, SOLUTION INTRAVENOUS at 12:06

## 2023-09-12 RX ADMIN — IPRATROPIUM BROMIDE 0.5 MG: 0.5 SOLUTION RESPIRATORY (INHALATION) at 16:03

## 2023-09-12 RX ADMIN — PIPERACILLIN AND TAZOBACTAM 3375 MG: 3; .375 INJECTION, POWDER, LYOPHILIZED, FOR SOLUTION INTRAVENOUS at 15:06

## 2023-09-12 RX ADMIN — Medication 10 ML: at 09:56

## 2023-09-12 RX ADMIN — LORAZEPAM 0.26 MG: 2 INJECTION INTRAMUSCULAR; INTRAVENOUS at 07:32

## 2023-09-12 RX ADMIN — THERA TABS 1 TABLET: TAB at 09:54

## 2023-09-12 RX ADMIN — Medication 40 MG: at 09:55

## 2023-09-12 RX ADMIN — MORPHINE SULFATE 1 MG: 2 INJECTION, SOLUTION INTRAMUSCULAR; INTRAVENOUS at 15:29

## 2023-09-12 RX ADMIN — RIFAXIMIN 550 MG: 550 TABLET ORAL at 09:55

## 2023-09-12 NOTE — PLAN OF CARE
Problem: Pain  Goal: Verbalizes/displays adequate comfort level or baseline comfort level  Outcome: Progressing     Problem: Safety - Adult  Goal: Free from fall injury  Outcome: Progressing     Problem: Skin/Tissue Integrity  Goal: Absence of new skin breakdown  Description: 1. Monitor for areas of redness and/or skin breakdown  2. Assess vascular access sites hourly  3. Every 4-6 hours minimum:  Change oxygen saturation probe site  4. Every 4-6 hours:  If on nasal continuous positive airway pressure, respiratory therapy assess nares and determine need for appliance change or resting period. Outcome: Progressing     Problem: ABCDS Injury Assessment  Goal: Absence of physical injury  Outcome: Progressing     Problem: Confusion  Goal: Confusion, delirium, dementia, or psychosis is improved or at baseline  Description: INTERVENTIONS:  1. Assess for possible contributors to thought disturbance, including medications, impaired vision or hearing, underlying metabolic abnormalities, dehydration, psychiatric diagnoses, and notify attending LIP  2. [place] high risk fall precautions, as indicated  3. Provide frequent short contacts to provide reality reorientation, refocusing and direction  4. Decrease environmental stimuli, including noise as appropriate  5. Monitor and intervene to maintain adequate nutrition, hydration, elimination, sleep and activity  6. If unable to ensure safety without constant attention obtain sitter and review sitter guidelines with assigned personnel  7.  Initiate Psychosocial CNS and Spiritual Care consult, as indicated  Outcome: Progressing     Problem: Neurosensory - Adult  Goal: Achieves stable or improved neurological status  Outcome: Progressing     Problem: Respiratory - Adult  Goal: Achieves optimal ventilation and oxygenation  Outcome: Progressing     Problem: Cardiovascular - Adult  Goal: Maintains optimal cardiac output and hemodynamic stability  Outcome: Progressing Problem: Genitourinary - Adult  Goal: Urinary catheter remains patent  Outcome: Progressing     Problem: Metabolic/Fluid and Electrolytes - Adult  Goal: Electrolytes maintained within normal limits  Outcome: Progressing     Problem: Hematologic - Adult  Goal: Maintains hematologic stability  Outcome: Progressing     Problem: Skin/Tissue Integrity - Adult  Goal: Skin integrity remains intact  Outcome: Progressing     Problem: Musculoskeletal - Adult  Goal: Return mobility to safest level of function  Outcome: Progressing  Goal: Return ADL status to a safe level of function  Outcome: Progressing     Problem: Gastrointestinal - Adult  Goal: Maintains or returns to baseline bowel function  Outcome: Progressing  Goal: Maintains adequate nutritional intake  Outcome: Progressing     Problem: Discharge Planning  Goal: Discharge to home or other facility with appropriate resources  Outcome: Progressing     Problem: Nutrition Deficit:  Goal: Optimize nutritional status  Outcome: Progressing

## 2023-09-12 NOTE — PROGRESS NOTES
Patient refusing to wear oxygen mask sats in 70's.  Given ativan, agreeable to wear mask after ativan given

## 2023-09-12 NOTE — PROGRESS NOTES
1510-Patient back from Thoracentesis. Patient returned on 15L HFNC due to decreased O2 sats during procedure, per RN returning patient to bedside. CXR obtained, will continue to monitor. 1530-Dr. Holly Sin notified of O2 demand changes s/p Thoracentesis. Also notified patient partially pulled out ngt. NGT advanced back to original external length, 65cm. Order received for cxr to confirm placement. 1600-Call received from radiology reporting results of cxr s/p Thoracentesis. Dr. Ayaz Snyder called and notified of the results. Per Dr. Ayaz Snyder, no need for any intervention at this time, continue to wean O2 as tolerated. Per Dr. Ayaz Snyder, NGT placement also confirmed and okay to use. Dr. Holly Sin aware. Continue to monitor. 1630-Patient more relaxed from anxiety meds,  respirations remain labored but O2 able to be weaned down to 7L. Continue to monitor.

## 2023-09-12 NOTE — PROGRESS NOTES
SLP NOTE    Swallow eval re-attempted. Patient discussed with RN. Plan to continue to hold swallow eval at this time d/t current respiratory status. ST to re-attempt later this date as schedule permits unless otherwise notified. If swallow eval is deemed no longer medically appropriate, please discontinue order. Thank you. [facility]id [facility] [place], [address] [facility], [id number]  Speech-Language Pathologist  Portable phone: [phone]

## 2023-09-12 NOTE — PROGRESS NOTES
[address] [facility]   [facility] [employer]  [phone]  Nephrology Consult Note          Patient ID: Tommy Sheldon  Referring/ Physician: Mariela Mari MD      HPI/Summary:   Tommy Sheldon is being seen by nephrology for hypernatremia that corrected quickly. This is a 77 yo lady with h/o alcoholic cirrhosis who presented with emesis with coffee grounds. She is admitted to ICU. She is awake but lethargic and offers no complaints and unable to provide any history. She was initially put on LR but sodium stayed elevated at 149 and then was changed to d5 when sodium dropped to 135. She has now been put on normal saline. She has a soft but distended abdomen. No peripheral edema. She has a sitter at bedside. CT abdomen showed edematous bowel, distended stomach, cirrhotic liver with splenomegaly and small ascites    She is very lethargic  Creatinine is 10.5  CO2 44  Potassium 3.5  Magnesium 1.7      Plan:   Potassium overall stable since yesterday  Sodium is stable  High oxygen requirement managed by pulmonary team  No evidence of pulmonary edema  Seen in room with multiple family members      #Hypernatremia  Na 149 > 135 , so corrected too quickly. D5 stopped and changed to NS. She presented with vomiting and AMS so likely free water deficit due dehydration    #Decompensated cirrhosis. GIB, low albumin and elevated iNR. Has portal hypertension. CR is ok but at risk for LEEANN  Add midodrine to raise MAP  On PPI and octreotide. #volume status  Small ascites , not fluid overloaded but risk of worsening ascites with crystalloid fluids with hypoalbuminemia. Monitor volume status and strict IO         [facility] would like to thank you for the opportunity to serve this patient. Please call with any questions or concerns.     Jaime Dye MD  [facility]  [facility], [address] [place] [address]  Fax: [phone]  Office: [phone]         CC/Reason for consult:   Reason for consult:   Chief Complaint   Patient presents with    Hematemesis     Vomiting dark blood that started last night    Rectal Bleeding     Dark blood started last night           Review of Systems:   Unable to obtain     PMH/SH/FH:    Medical Hx: reviewed and updated as appropriate  Past Medical History:   Diagnosis Date    Alcoholic cirrhosis of liver (HCC)     w/ portal hypertension    Chronic pancreatitis (HCC)     DVT (deep venous thrombosis) (HCC)     Esophageal varices (HCC)     Iron deficiency anemia     Reflux esophagitis          Surgical Hx: reviewed and updated as appropriate   has a past surgical history that includes Upper gastrointestinal endoscopy (N/A, 3/6/2019); Upper gastrointestinal endoscopy (N/A, 3/6/2019); Upper gastrointestinal endoscopy (N/A, 3/6/2019); Colonoscopy (N/A, 3/8/2019); Upper gastrointestinal endoscopy (03/12/2019); Upper gastrointestinal endoscopy (03/12/2019); Endoscopy, small bowel with ileum (N/A, 3/12/2019); Upper gastrointestinal endoscopy (N/A, 3/12/2019); Upper gastrointestinal endoscopy (N/A, 9/1/2022); Upper gastrointestinal endoscopy (9/1/2022); Upper gastrointestinal endoscopy (9/1/2022); Upper gastrointestinal endoscopy (N/A, 7/2/2023); and Upper gastrointestinal endoscopy (N/A, 9/3/2023). Social Hx: reviewed and updated as appropriate  Social History     Tobacco Use    Smoking status: Some Days     Packs/day: .5     Types: Cigarettes    Smokeless tobacco: Never   Substance Use Topics    Alcohol use: Not Currently        Family hx: reviewed and updated as appropriate  family history is not on file.     Medications:   furosemide, 40 mg, BID  piperacillin-tazobactam, 3,375 mg, Q8H  insulin lispro, 0-8 Units, TID WC  insulin lispro, 0-4 Units, Nightly  ipratropium, 0.5 mg, 4x Daily RT  [Held by provider] carvedilol, 6.25 mg, BID WC  pantoprazole (PROTONIX) 40 mg in sodium chloride (PF) 0.9 % 10 mL injection, 40 mg, 2 times per day  rifAXIMin, 550 mg, BID  thiamine, 100

## 2023-09-12 NOTE — PROGRESS NOTES
Facility/Department: [address] CARE  Initial Assessment  DYSPHAGIA BEDSIDE SWALLOW EVALUATION     Patient: Olinda Willard   : 1955   MRN: [id number]      Evaluation Date: 2023   Admitting Diagnosis:   Upper GI bleed [K92.2]    Pain: Did not state                                                       CHART REVIEW:  9/3/2023 admitted with c/o multiple episodes of coffee ground emesis  MD ADMISSION H&P HPI:  Pt is an 76y.o. year-old female with a history that includes a history of alcoholic cirrhosis with portal hypertension and known esophageal varices status post recent banding for upper GI bleed, chronic pancreatitis, history of DVT maintained on Eliquis and history of iron deficiency anemia now presents with complaints of upper GI bleed. Per GI, she underwent an upper endoscopy on  with findings of grade D reflux esophagitis, medium size esophageal varices with overlying severe esophagitis, minimal portal hypertensive gastropathy and a large 2 cm cratered duodenal ulcer with pigmented spots. The varices were ligated. She is due for repeat EGD with ligation in October per scheduling. She presents to the emergency room for evaluation after having multiple episodes of coffee-ground emesis today before she arrived in the emergency room. In the emergency room she was found to have acute blood loss anemia with an initial hemoglobin of 6.5. She was found to have hypovolemic shock with an initial blood pressure of 67/48. She was resuscitated with IV fluids and transfused 2 units of packed red blood cells and her pressure responded favorably. She was given octreotide and started on a Protonix drip. While in the ER she had an episode of large volume hematemesis. Gastroenterology was consulted and came and after hours to perform an emergent Esophagogastroduodenoscopy with control of bleeding and variceal band ligation. Pt is being admitted to the ICU.  Associated signs and symptoms do not

## 2023-09-12 NOTE — PROGRESS NOTES
Comprehensive Nutrition Assessment    Type and Reason for Visit:  Reassess    Nutrition Recommendations/Plan:   Easy to chew textures per SLP  Continue TF regimen, consider cyclic regimen once patient sis more awake and eating at least half of tray      Malnutrition Assessment:  Malnutrition Status:  Severe malnutrition (09/08/23 0901)    Context:  Chronic Illness     Findings of the 6 clinical characteristics of malnutrition:  Energy Intake:  Unable to assess  Weight Loss:  Unable to assess (fluid shifts)     Body Fat Loss:  Severe body fat loss Orbital, Triceps   Muscle Mass Loss:  Severe muscle mass loss Temples (temporalis), Clavicles (pectoralis & deltoids), Scapula (trapezius), Hand (interosseous)  Fluid Accumulation:  Mild Extremities   Strength:  Not Performed    Nutrition Assessment:    Follow up:  Diet progressed to easy to chew by SLP earlier today. Nurse reported patient is tolerating tube feeding regimen without any concerns. Recently patient has been drowsy, nurse feels likely will not eat much today. Nutrition Related Findings:    labs reviewed 9/12 Wound Type: None       Current Nutrition Intake & Therapies:    Average Meal Intake: NPO  Average Supplements Intake: NPO  ADULT TUBE FEEDING; Nasogastric; Standard with Fiber; Continuous; 20; Yes; 20; Q 8 hours; 55; 30; Q 4 hours  ADULT DIET; Easy to Chew  Current Tube Feeding (TF) Orders:  Feeding Route: Nasogastric  Formula: Standard with Fiber  Schedule: Continuous  Feeding Regimen: Jevity 1.5 formula to slowly advance toward goal rate of 55 ml per hour, monitoring for refeeding. Rate adjusted for routine nursing care (~20 hour run).   Additives/Modulars: None  Water Flushes: per provider  Current TF & Flush Orders Provides:    Goal TF & Flush Orders Provides: 1100 ml TV / 1650 kcals / 70 gms pro / 836 ml free water per day    Anthropometric Measures:  Height: 5' 2\" (157.5 cm)  Ideal Body Weight (IBW): 110 lbs (50 kg)    Admission Body Weight:

## 2023-09-12 NOTE — PROGRESS NOTES
Called from nurse. Patient came back from the thoracentesis on 15 L nasal cannula. Chest x-ray demonstrates pneumothorax. With review of the chest x-ray, it appears to be consistent with an ex vacuo pneumothorax. Follow-up chest x-ray has already been completed with no change. Not clear why she is on 15 L. She had prior episode with pulse ox not reading properly. Suspect this was increased secondary to concern for pneumothorax. Advised the nurse to wean to 90% saturation. Follow-up chest x-ray ordered for tomorrow. Unlikely to have additional collapse.

## 2023-09-12 NOTE — PROGRESS NOTES
Pt. Was being cleaned up and SpO2 dropped into the 70's, patient was on venti mask. Pt. Placed on on NRB due to desaturation, and patient is refusing vapotherm.

## 2023-09-12 NOTE — PROGRESS NOTES
V2.0    [facility] Progress Note      Name:  Anila Lopes /Age/Sex: 1955  (77 y.o. female)   MRN & CSN:  [id number] & [id number] Encounter Date/Time: 2023 11:54 AM EDT   Location:  [id number] PCP: Chano Mo RN, NP     Elizabeth Dowell MD       Hospital Day: 10    Date of Admission: 9/3/2023    Assessment and Recommendations     Interval history:    Anila Lopes is a 76 y.o. female with pmh of as below who presents with hematemesis. 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 Status post variceal , grade D reflux esophagitis noted, portal hypertensive gastropathy noted, no residual esophageal varices. Patient required blood transfusion intermittently for acute blood loss anemia, hemoglobin stabilized. Her she completed octreotide infusion, IV PPI infusion. Patient hospital course protracted secondary to acute encephalopathy which subsequently improved but patient developed worsening respiratory failure, per the discussion with the pulmonary team, patient elected to have limited code. Patient contemplating hospice      2023 Pt with worsening respiratory failure  -Chest x-ray 2023 with pneumothorax. Pulmonary following and the current plan is to monitor.   -Chest x-ray 2023 with hydropneumothorax  -Patient is refusing BiPAP, intubation. Limited code per the discussion with pulmonary team.  -Component of volume overload, will give IV Lasix        aspiration pneumonitis, likely secondary to placement of chest tube in the right lung. I will initiate antibiotics in the light of the worsening respiratory failure    Pneumothorax  ? Related to chest tube placement in the right lung. Pulmonary following. Acute encephalopathy, likely toxic metabolic (hepatic encephalopathy, hypernatremia,)  -CT scan obtained today, no acute finding  - ?   Hepatic

## 2023-09-12 NOTE — PROGRESS NOTES
Patient refusing to wear NRB this AM. Sats down to 60's. Patient given prn ativan and then agreeable to wear mask again.  Sister at bedside with patient

## 2023-09-13 ENCOUNTER — APPOINTMENT (OUTPATIENT)
Dept: GENERAL RADIOLOGY | Age: 68
End: 2023-09-13
Payer: MEDICARE

## 2023-09-13 LAB
ACTH PLAS-MCNC: 51 PG/ML (ref 6–58)
ALBUMIN SERPL-MCNC: 3.2 G/DL (ref 3.4–5)
ANION GAP SERPL CALCULATED.3IONS-SCNC: 4 MMOL/L (ref 3–16)
BUN SERPL-MCNC: 16 MG/DL (ref 7–20)
CALCIUM SERPL-MCNC: 8.2 MG/DL (ref 8.3–10.6)
CHLORIDE SERPL-SCNC: 88 MMOL/L (ref 99–110)
CO2 SERPL-SCNC: >50 MMOL/L (ref 21–32)
CREAT SERPL-MCNC: <0.5 MG/DL (ref 0.6–1.2)
DEPRECATED RDW RBC AUTO: 18.5 % (ref 12.4–15.4)
EST. AVERAGE GLUCOSE BLD GHB EST-MCNC: 122.6 MG/DL
GFR SERPLBLD CREATININE-BSD FMLA CKD-EPI: >60 ML/MIN/{1.73_M2}
GLUCOSE BLD-MCNC: 166 MG/DL (ref 70–99)
GLUCOSE BLD-MCNC: 168 MG/DL (ref 70–99)
GLUCOSE BLD-MCNC: 178 MG/DL (ref 70–99)
GLUCOSE BLD-MCNC: 286 MG/DL (ref 70–99)
GLUCOSE BLD-MCNC: 316 MG/DL (ref 70–99)
GLUCOSE SERPL-MCNC: 199 MG/DL (ref 70–99)
HBA1C MFR BLD: 5.9 %
HCT VFR BLD AUTO: 26.3 % (ref 36–48)
HGB BLD-MCNC: 8.5 G/DL (ref 12–16)
MCH RBC QN AUTO: 28.3 PG (ref 26–34)
MCHC RBC AUTO-ENTMCNC: 32.3 G/DL (ref 31–36)
MCV RBC AUTO: 87.7 FL (ref 80–100)
PATH CONSULT FLUID: NORMAL
PERFORMED ON: ABNORMAL
PHOSPHATE SERPL-MCNC: 1.8 MG/DL (ref 2.5–4.9)
PLATELET # BLD AUTO: 52 K/UL (ref 135–450)
PMV BLD AUTO: 8.8 FL (ref 5–10.5)
POTASSIUM SERPL-SCNC: 3.2 MMOL/L (ref 3.5–5.1)
RBC # BLD AUTO: 3 M/UL (ref 4–5.2)
SODIUM SERPL-SCNC: 142 MMOL/L (ref 136–145)
WBC # BLD AUTO: 6.3 K/UL (ref 4–11)

## 2023-09-13 PROCEDURE — 71045 X-RAY EXAM CHEST 1 VIEW: CPT

## 2023-09-13 PROCEDURE — 2580000003 HC RX 258: Performed by: INTERNAL MEDICINE

## 2023-09-13 PROCEDURE — 6370000000 HC RX 637 (ALT 250 FOR IP): Performed by: INTERNAL MEDICINE

## 2023-09-13 PROCEDURE — 6370000000 HC RX 637 (ALT 250 FOR IP): Performed by: STUDENT IN AN ORGANIZED HEALTH CARE EDUCATION/TRAINING PROGRAM

## 2023-09-13 PROCEDURE — 80069 RENAL FUNCTION PANEL: CPT

## 2023-09-13 PROCEDURE — 6360000002 HC RX W HCPCS: Performed by: INTERNAL MEDICINE

## 2023-09-13 PROCEDURE — 6360000002 HC RX W HCPCS: Performed by: HOSPITALIST

## 2023-09-13 PROCEDURE — 2580000003 HC RX 258: Performed by: HOSPITALIST

## 2023-09-13 PROCEDURE — 32551 INSERTION OF CHEST TUBE: CPT

## 2023-09-13 PROCEDURE — 94761 N-INVAS EAR/PLS OXIMETRY MLT: CPT

## 2023-09-13 PROCEDURE — 94640 AIRWAY INHALATION TREATMENT: CPT

## 2023-09-13 PROCEDURE — C9113 INJ PANTOPRAZOLE SODIUM, VIA: HCPCS | Performed by: INTERNAL MEDICINE

## 2023-09-13 PROCEDURE — 85027 COMPLETE CBC AUTOMATED: CPT

## 2023-09-13 PROCEDURE — 99232 SBSQ HOSP IP/OBS MODERATE 35: CPT | Performed by: INTERNAL MEDICINE

## 2023-09-13 PROCEDURE — 83036 HEMOGLOBIN GLYCOSYLATED A1C: CPT

## 2023-09-13 PROCEDURE — 6360000002 HC RX W HCPCS: Performed by: STUDENT IN AN ORGANIZED HEALTH CARE EDUCATION/TRAINING PROGRAM

## 2023-09-13 PROCEDURE — 2700000000 HC OXYGEN THERAPY PER DAY

## 2023-09-13 PROCEDURE — 6370000000 HC RX 637 (ALT 250 FOR IP): Performed by: HOSPITALIST

## 2023-09-13 PROCEDURE — 2580000003 HC RX 258: Performed by: ANESTHESIOLOGY

## 2023-09-13 PROCEDURE — 2060000000 HC ICU INTERMEDIATE R&B

## 2023-09-13 PROCEDURE — 2500000003 HC RX 250 WO HCPCS: Performed by: HOSPITALIST

## 2023-09-13 RX ORDER — DIVALPROEX SODIUM 125 MG/1
250 CAPSULE, COATED PELLETS ORAL EVERY 8 HOURS SCHEDULED
Status: DISCONTINUED | OUTPATIENT
Start: 2023-09-13 | End: 2023-09-21 | Stop reason: HOSPADM

## 2023-09-13 RX ORDER — MORPHINE SULFATE 4 MG/ML
4 INJECTION, SOLUTION INTRAMUSCULAR; INTRAVENOUS EVERY 4 HOURS PRN
Status: DISCONTINUED | OUTPATIENT
Start: 2023-09-13 | End: 2023-09-21 | Stop reason: HOSPADM

## 2023-09-13 RX ADMIN — INSULIN GLARGINE 5 UNITS: 100 INJECTION, SOLUTION SUBCUTANEOUS at 00:39

## 2023-09-13 RX ADMIN — Medication 40 MG: at 09:38

## 2023-09-13 RX ADMIN — FUROSEMIDE 40 MG: 10 INJECTION, SOLUTION INTRAMUSCULAR; INTRAVENOUS at 17:32

## 2023-09-13 RX ADMIN — INSULIN GLARGINE 5 UNITS: 100 INJECTION, SOLUTION SUBCUTANEOUS at 23:17

## 2023-09-13 RX ADMIN — Medication 10 ML: at 09:38

## 2023-09-13 RX ADMIN — MIDODRINE HYDROCHLORIDE 5 MG: 5 TABLET ORAL at 09:42

## 2023-09-13 RX ADMIN — LORAZEPAM 0.26 MG: 2 INJECTION INTRAMUSCULAR; INTRAVENOUS at 00:16

## 2023-09-13 RX ADMIN — PIPERACILLIN AND TAZOBACTAM 3375 MG: 3; .375 INJECTION, POWDER, LYOPHILIZED, FOR SOLUTION INTRAVENOUS at 13:49

## 2023-09-13 RX ADMIN — RIFAXIMIN 550 MG: 550 TABLET ORAL at 09:42

## 2023-09-13 RX ADMIN — POTASSIUM CHLORIDE 20 MEQ: 29.8 INJECTION, SOLUTION INTRAVENOUS at 06:51

## 2023-09-13 RX ADMIN — Medication 10 ML: at 20:40

## 2023-09-13 RX ADMIN — INSULIN LISPRO 4 UNITS: 100 INJECTION, SOLUTION INTRAVENOUS; SUBCUTANEOUS at 09:35

## 2023-09-13 RX ADMIN — INSULIN LISPRO 6 UNITS: 100 INJECTION, SOLUTION INTRAVENOUS; SUBCUTANEOUS at 13:08

## 2023-09-13 RX ADMIN — FUROSEMIDE 40 MG: 10 INJECTION, SOLUTION INTRAMUSCULAR; INTRAVENOUS at 09:35

## 2023-09-13 RX ADMIN — IPRATROPIUM BROMIDE 0.5 MG: 0.5 SOLUTION RESPIRATORY (INHALATION) at 19:38

## 2023-09-13 RX ADMIN — THIAMINE HYDROCHLORIDE 100 MG: 100 INJECTION, SOLUTION INTRAMUSCULAR; INTRAVENOUS at 09:45

## 2023-09-13 RX ADMIN — MORPHINE SULFATE 4 MG: 4 INJECTION, SOLUTION INTRAMUSCULAR; INTRAVENOUS at 20:33

## 2023-09-13 RX ADMIN — Medication 40 MG: at 20:39

## 2023-09-13 RX ADMIN — FOLIC ACID 1 MG: 1 TABLET ORAL at 09:41

## 2023-09-13 RX ADMIN — THERA TABS 1 TABLET: TAB at 09:42

## 2023-09-13 RX ADMIN — LORAZEPAM 0.26 MG: 2 INJECTION INTRAMUSCULAR; INTRAVENOUS at 17:32

## 2023-09-13 RX ADMIN — LACTULOSE 20 G: 20 SOLUTION ORAL at 09:43

## 2023-09-13 RX ADMIN — SODIUM PHOSPHATE, MONOBASIC, MONOHYDRATE AND SODIUM PHOSPHATE, DIBASIC, ANHYDROUS 8.22 MMOL: 142; 276 INJECTION, SOLUTION INTRAVENOUS at 06:42

## 2023-09-13 RX ADMIN — MORPHINE SULFATE 1 MG: 2 INJECTION, SOLUTION INTRAMUSCULAR; INTRAVENOUS at 15:53

## 2023-09-13 RX ADMIN — PIPERACILLIN AND TAZOBACTAM 3375 MG: 3; .375 INJECTION, POWDER, LYOPHILIZED, FOR SOLUTION INTRAVENOUS at 00:19

## 2023-09-13 RX ADMIN — PIPERACILLIN AND TAZOBACTAM 3375 MG: 3; .375 INJECTION, POWDER, LYOPHILIZED, FOR SOLUTION INTRAVENOUS at 22:31

## 2023-09-13 RX ADMIN — POTASSIUM CHLORIDE 20 MEQ: 29.8 INJECTION, SOLUTION INTRAVENOUS at 09:13

## 2023-09-13 RX ADMIN — PIPERACILLIN AND TAZOBACTAM 3375 MG: 3; .375 INJECTION, POWDER, LYOPHILIZED, FOR SOLUTION INTRAVENOUS at 06:41

## 2023-09-13 ASSESSMENT — PAIN DESCRIPTION - LOCATION
LOCATION: RIB CAGE
LOCATION: BACK

## 2023-09-13 ASSESSMENT — PAIN SCALES - WONG BAKER
WONGBAKER_NUMERICALRESPONSE: 8
WONGBAKER_NUMERICALRESPONSE: 0
WONGBAKER_NUMERICALRESPONSE: 8
WONGBAKER_NUMERICALRESPONSE: 8

## 2023-09-13 ASSESSMENT — PAIN SCALES - GENERAL
PAINLEVEL_OUTOF10: 8
PAINLEVEL_OUTOF10: 0
PAINLEVEL_OUTOF10: 0
PAINLEVEL_OUTOF10: 8

## 2023-09-13 ASSESSMENT — PAIN DESCRIPTION - ORIENTATION
ORIENTATION: RIGHT
ORIENTATION: RIGHT

## 2023-09-13 NOTE — PROGRESS NOTES
Patient took off O2 sats down to 70's pulled on NG pulled to 50 reinserted to 65 will verify with CXR

## 2023-09-13 NOTE — PROGRESS NOTES
RN rounding, family at bedside. RN attempting to reposition patient, pt shouting and demanding this nurse leave her alone. Family assisting in reorienting pt, patient continues to scorn this nurse and call us 'EVIL'. Pt also cursing at son and sister at bedside. Refusing all po meds.    Electronically signed by Christine Panda RN on 9/13/2023 at 2:00 PM

## 2023-09-13 NOTE — PROGRESS NOTES
Patient continuing to pull out oxygen and pulling at NG tube. Sent message to [facility] NP for bridle and possibility of restraints based on patient actions. Restraints ordered. CXR resulted showing complete Pneumothorax Migdalia NP was notified of results and is at bedside with Alice Farmer DO for placement of chest tube. Called son for procedure consent. Patient no longer oriented. Agreeable to procedure.  Chest tube placed and verified with another XR

## 2023-09-13 NOTE — PROGRESS NOTES
5- Pt son and sister remain at bedside, requesting to speak to MD. RN sent PS to Dr Telma Dean. 1450- Pt began coughing heavily, RN at bedside and noted tube feed like sputum. Left lung sound clear, DARREN lung sounds in right lobes. RN assessed NG tube, noted at 11cm. TF placed on hold. Sent PS to Dr Telma Dean requesting STAT CXR.    1500- RN ordered STAT CXR.     9741- CXR completed. RN called xray dept and asked if they saw the film and requested STAT results. 1600- Pt grimacing and hold right side and stating pain at chest tube exit site. RN assessed site, tube at skin measuring at 14, (noted previous measurement noted was 13,) RN administered PRN morphine. Family remains at bedside. Awaiting call from MD still. 1640- RN sent PS to MD notifying of pt pain level after receiving morphine and family expressing concerns. Family also asking why they havent seen the doctor yet. 1645- RN sent PS to head hospitalist requesting an MD speak to family. 46- RN received call from Dr Telma Dean regarding pt concerns, he stated he not comfortable increasing pain meds at this time but started depakote sprinkles TID.    1725- Pt family refusing the depakote sprinkles. RN noted morphine order did get increased to 4mg. RN gave PRN lorazepam for anxiety. 200- RN received call from Dr Telma Dean stating CXR results are in and NG tube not noted on films at all. New orders received to remove current tube and place new. Orders carried out. Tube clamped at 55cm. STAT CXR ordered again to verify placement. Family remains at bedside.     Electronically signed by Megan Aranda RN on 9/13/2023 at 8:06 PM

## 2023-09-13 NOTE — PROGRESS NOTES
SLP NOTE    Dysphagia treatment attempted. Patient declining PO trials with ST requesting to hold re-attempt until a later date d/t not feeling well. ST to re-attempt as schedule permits at a later date unless otherwise notified. Son and RN deny concerns for dysphagia with limited accepted PO this date. Thank you. Astrid Joseph, [address] [facility], [id number]  Speech-Language Pathologist  Portable phone: [phone]

## 2023-09-13 NOTE — PROGRESS NOTES
V2.0    [facility] [place]      Name:  Sophia Lee /Age/Sex: 1955  (77 y.o. female)   MRN & CSN:  [id number] & [id number] Encounter Date/Time: 2023 11:54 AM EDT   Location:  [id number]/5275-01 PCP: Taj Ignacio RN, NP     Vivian Menendez MD       Hospital Day: 11    Date of Admission: 9/3/2023    Assessment and Recommendations     Interval history:    Sophia Lee is a 76 y.o. female with pmh of as below who presents with hematemesis. 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 Status post variceal , grade D reflux esophagitis noted, portal hypertensive gastropathy noted, no residual esophageal varices. Patient required blood transfusion intermittently for acute blood loss anemia, hemoglobin stabilized. Her she completed octreotide infusion, IV PPI infusion. Patient hospital course protracted secondary to acute encephalopathy which subsequently improved but patient developed worsening respiratory failure, per the discussion with the pulmonary team, patient elected to have limited code. Patient contemplating hospice      2023 Pt with worsening respiratory failure  - Worsening pneumothorax with increasing O2 demands - required chest tube placement the morning of 2023.   - Pulmonary managing chest tube  - Chest x-ray 2023 with pneumothorax. - Chest x-ray 2023 with hydropneumothorax  - Patient is refusing BiPAP, intubation. Limited code per the discussion with pulmonary team.  - Component of volume overload (+ 3.2 liters), continue IV Lasix      Aspiration pneumonitis, likely secondary to placement of chest tube in the right lung.  - Continue antibiotics in the light of the worsening respiratory failure    Acute encephalopathy, likely toxic metabolic (hepatic encephalopathy, hypernatremia)  -CT scan obtained 2023, no acute finding  - ? CBC:   Recent Labs     09/11/23  0545 09/12/23  0550 09/13/23  0430   WBC 6.4 3.8* 6.3   HGB 8.2* 7.3* 8.5*   PLT 58* 40* 52*       BMP:    Recent Labs     09/11/23  0545 09/11/23  1320 09/12/23  0550 09/13/23  0430     --  145 142   K 3.1* 3.6 3.5 3.2*     --  97* 88*   CO2 33*  --  44* >50*   BUN 16  --  16 16   CREATININE <0.5*  --  <0.5* <0.5*   GLUCOSE 243*  --  352* 199*       Hepatic:   No results for input(s): \"AST\", \"ALT\", \"ALB\", \"BILITOT\", \"ALKPHOS\" in the last 72 hours. Lipids:   Lab Results   Component Value Date/Time    CHOL 54 07/02/2023 05:19 AM    HDL 25 07/02/2023 05:19 AM    TRIG 53 07/02/2023 05:19 AM     Hemoglobin A1C:   Lab Results   Component Value Date/Time    LABA1C 5.9 09/13/2023 04:30 AM     TSH:   Lab Results   Component Value Date/Time    TSH 0.01 09/06/2023 04:20 AM     Troponin: No results found for: \"TROPONINT\"  Lactic Acid: No results for input(s): \"LACTA\" in the last 72 hours. BNP: No results for input(s): \"PROBNP\" in the last 72 hours. UA:  Lab Results   Component Value Date/Time    NITRU Negative 09/03/2023 05:51 PM    COLORU DARK YELLOW 09/03/2023 05:51 PM    PHUR 6.0 09/03/2023 05:51 PM    LABCAST 10-20 Hyaline 03/09/2019 04:00 PM    WBCUA 7 09/03/2023 05:51 PM    RBCUA 3 09/03/2023 05:51 PM    YEAST Present 03/09/2019 04:00 PM    BACTERIA None Seen 09/03/2023 05:51 PM    CLARITYU Clear 09/03/2023 05:51 PM    SPECGRAV 1.023 09/03/2023 05:51 PM    LEUKOCYTESUR TRACE 09/03/2023 05:51 PM    UROBILINOGEN 1.0 09/03/2023 05:51 PM    BILIRUBINUR MODERATE 09/03/2023 05:51 PM    BLOODU Negative 09/03/2023 05:51 PM    GLUCOSEU Negative 09/03/2023 05:51 PM    KETUA TRACE 09/03/2023 05:51 PM     Urine Cultures:   Lab Results   Component Value Date/Time    LABURIN No growth at 18-36 hours 03/04/2019 07:09 PM     Blood Cultures:   Lab Results   Component Value Date/Time    BC No Growth after 4 days of incubation.  09/07/2023 11:07 AM     Lab Results   Component Value

## 2023-09-13 NOTE — PROGRESS NOTES
Pt with intermittent cough, productive and color is consistent with tube feed. Moderate amounts with each cough. NG tube noted at 11cm cande. RN sent PS to MD requesting STAT CXR for placement verification. Awaiting new orders.     Electronically signed by Dearemelia Dee RN on 9/13/2023 at 3:11 PM

## 2023-09-13 NOTE — PROGRESS NOTES
RN at bedside attempting to administer PO meds, pt refusing insisting this nurse is \"the devil\". Family at bedside assisting with redirection.      Electronically signed by Jayesh Bellamy RN on 9/13/2023 at 1:56 PM

## 2023-09-13 NOTE — PROGRESS NOTES
[facility]  Palliative Care   Progress Note    NAME:  Cj Haque  MEDICAL RECORD NUMBER:  [id number]  AGE: 76 y.o. GENDER: female  : 1955  TODAY'S DATE:  2023    Subjective: Patient confused, not wanting to take meds, states some staff are the devil. Objective:    Vitals:    23 0839   BP:    Pulse:    Resp:    Temp:    SpO2: 92%     Lab Results   Component Value Date    WBC 6.3 2023    HGB 8.5 (L) 2023    HCT 26.3 (L) 2023    MCV 87.7 2023    PLT 52 (L) 2023     Lab Results   Component Value Date    CREATININE <0.5 (L) 2023    BUN 16 2023     2023    K 3.2 (L) 2023    CL 88 (L) 2023    CO2 >50 (HH) 2023     Lab Results   Component Value Date    ALT 21 2023    AST 51 (H) 2023    ALKPHOS 131 (H) 2023    BILITOT 3.0 (H) 2023       Plan: She now has chest tube placed last night, breathing better. Her son is at bedside and is not sure what to do for her he is agreeable to hospice just not sure where and if this is appropriate. Difficult to assess what is going on, she still has tube feeding but has diet order, breathing better after chest tube but now confused and in restraints. Will see how she is in next few days. Code Status: Limited  Discharge Environment:  [] Hospice Consult Agency:  [] Inpatient Hospice    [] Home with [facility]   [] ECF with Hospice  [] ECF skilled care with Hospice to follow   [] Other:    Teaching Time: 30 min     I will continue to follow Ms. Dooley's care as needed. Thank you for allowing me to participate in the care of Ms. Fransico Fairbanks .      Electronically signed by Crystal Keith, RN, BSN,CHPN on 2023 at 11:32 AM  Palliative Care Nurse [facility]  Office: [phone]

## 2023-09-13 NOTE — PROGRESS NOTES
Pt brother (Mac) at bedside, and pt refusing to accept meds or fluids from this RN, stating \"you poisoned it, I'm  not taking anything from you\". Pt brother offered to assist, pt accepting meds and drinks from brother at this time. H2T assessment completed and noted pt has distention in ABD (gross ascites appearance), and previous assessment listed a flat ABD, RN asking family about baseline appearance, sister stated this ABD presentation is \"normal for her and it was like this before\". Will monitor.     Electronically signed by Vitaly Cabrera RN on 9/13/2023 at 9:52 AM

## 2023-09-13 NOTE — CARE COORDINATION
SW left  for son Fortuna Loss, to return call to confirm dc plan.      Benjamin Purdy LMSW, [address] [facility] Social Work Case Management   Phone: [phone]  Fax: [phone]

## 2023-09-13 NOTE — PROCEDURES
Indication: complete RIGHT sided pneumothorax w/ mild right to left shifting. Pt not reliably able to provide consent, so I called and spoke with her son LOUIE and he gave consent to proceed with procedure. We discussed the iatrogenic pneumothorax, consequences of this condition. We discussed the thoracostomy tube insertion, indications, contra-indications, complications: infection, pain, bleeding, nerve injury, etc.    Hand washing, sterile cap, gown and gloves donned. Sterile drap applied. Site: right chest wall: 5th ICS. Site prepped with chloroprep x3 and allowed to dry. Local anesthetic w/ 18 g needle: 3 ml Lidocaine 1%. 18g needle advanced into space-> air evacuated-> guide wire advanced and placed. Small puncture made with scalpel. Needle removed. Dilator inserted easily then removed. 14 fr pig tale cook catheter thoracostomy tube placed  via Seldinger technique successfully 1st attempt  to the 13cm line well past fenestrations, air evacuation, condensation in the tube, connected to plueravac and  LCWS at 20cm H2O, sutured in place, sterile xeroform dressing applied and gauze and tegaderm. Immediate bubbling-> great tidaling-> no evidence of air leak. Tubing connections in place. Pt tolerated procedure well. pCXR obtained-> preliminarily reviewed per myself-> CT in place with improvement of pneumothorax. Post procedure: HR remains mid 90's, sat 97 and RR improved from mid 30's -> 20. Dr. Autumn Fung was present at bedside.

## 2023-09-14 ENCOUNTER — APPOINTMENT (OUTPATIENT)
Dept: GENERAL RADIOLOGY | Age: 68
End: 2023-09-14
Payer: MEDICARE

## 2023-09-14 LAB
ALBUMIN SERPL-MCNC: 2.7 G/DL (ref 3.4–5)
AMMONIA PLAS-SCNC: 31 UMOL/L (ref 11–51)
ANION GAP SERPL CALCULATED.3IONS-SCNC: 4 MMOL/L (ref 3–16)
BUN SERPL-MCNC: 17 MG/DL (ref 7–20)
CALCIUM SERPL-MCNC: 8.4 MG/DL (ref 8.3–10.6)
CHLORIDE SERPL-SCNC: 93 MMOL/L (ref 99–110)
CO2 SERPL-SCNC: >50 MMOL/L (ref 21–32)
CREAT SERPL-MCNC: <0.5 MG/DL (ref 0.6–1.2)
GFR SERPLBLD CREATININE-BSD FMLA CKD-EPI: >60 ML/MIN/{1.73_M2}
GLUCOSE BLD-MCNC: 157 MG/DL (ref 70–99)
GLUCOSE BLD-MCNC: 168 MG/DL (ref 70–99)
GLUCOSE BLD-MCNC: 192 MG/DL (ref 70–99)
GLUCOSE BLD-MCNC: 411 MG/DL (ref 70–99)
GLUCOSE BLD-MCNC: 81 MG/DL (ref 70–99)
GLUCOSE SERPL-MCNC: 81 MG/DL (ref 70–99)
PERFORMED ON: ABNORMAL
PERFORMED ON: NORMAL
PHOSPHATE SERPL-MCNC: 3.3 MG/DL (ref 2.5–4.9)
POTASSIUM SERPL-SCNC: 3.7 MMOL/L (ref 3.5–5.1)
SODIUM SERPL-SCNC: 147 MMOL/L (ref 136–145)

## 2023-09-14 PROCEDURE — 80069 RENAL FUNCTION PANEL: CPT

## 2023-09-14 PROCEDURE — 6360000002 HC RX W HCPCS: Performed by: INTERNAL MEDICINE

## 2023-09-14 PROCEDURE — 2580000003 HC RX 258: Performed by: ANESTHESIOLOGY

## 2023-09-14 PROCEDURE — 2060000000 HC ICU INTERMEDIATE R&B

## 2023-09-14 PROCEDURE — 82140 ASSAY OF AMMONIA: CPT

## 2023-09-14 PROCEDURE — 2700000000 HC OXYGEN THERAPY PER DAY

## 2023-09-14 PROCEDURE — 99233 SBSQ HOSP IP/OBS HIGH 50: CPT | Performed by: INTERNAL MEDICINE

## 2023-09-14 PROCEDURE — 6370000000 HC RX 637 (ALT 250 FOR IP): Performed by: INTERNAL MEDICINE

## 2023-09-14 PROCEDURE — 6370000000 HC RX 637 (ALT 250 FOR IP): Performed by: STUDENT IN AN ORGANIZED HEALTH CARE EDUCATION/TRAINING PROGRAM

## 2023-09-14 PROCEDURE — 2580000003 HC RX 258: Performed by: INTERNAL MEDICINE

## 2023-09-14 PROCEDURE — 71045 X-RAY EXAM CHEST 1 VIEW: CPT

## 2023-09-14 PROCEDURE — 6360000002 HC RX W HCPCS: Performed by: HOSPITALIST

## 2023-09-14 PROCEDURE — 2580000003 HC RX 258: Performed by: HOSPITALIST

## 2023-09-14 PROCEDURE — 94761 N-INVAS EAR/PLS OXIMETRY MLT: CPT

## 2023-09-14 PROCEDURE — C9113 INJ PANTOPRAZOLE SODIUM, VIA: HCPCS | Performed by: INTERNAL MEDICINE

## 2023-09-14 PROCEDURE — 32551 INSERTION OF CHEST TUBE: CPT

## 2023-09-14 PROCEDURE — 92526 ORAL FUNCTION THERAPY: CPT

## 2023-09-14 PROCEDURE — 94640 AIRWAY INHALATION TREATMENT: CPT

## 2023-09-14 PROCEDURE — 6370000000 HC RX 637 (ALT 250 FOR IP): Performed by: HOSPITALIST

## 2023-09-14 RX ADMIN — RIFAXIMIN 550 MG: 550 TABLET ORAL at 21:42

## 2023-09-14 RX ADMIN — LACTULOSE 20 G: 20 SOLUTION ORAL at 13:08

## 2023-09-14 RX ADMIN — INSULIN LISPRO 8 UNITS: 100 INJECTION, SOLUTION INTRAVENOUS; SUBCUTANEOUS at 17:35

## 2023-09-14 RX ADMIN — Medication 40 MG: at 09:22

## 2023-09-14 RX ADMIN — IPRATROPIUM BROMIDE 0.5 MG: 0.5 SOLUTION RESPIRATORY (INHALATION) at 08:17

## 2023-09-14 RX ADMIN — Medication 10 ML: at 04:30

## 2023-09-14 RX ADMIN — DIVALPROEX SODIUM 250 MG: 125 CAPSULE ORAL at 13:08

## 2023-09-14 RX ADMIN — PIPERACILLIN AND TAZOBACTAM 3375 MG: 3; .375 INJECTION, POWDER, LYOPHILIZED, FOR SOLUTION INTRAVENOUS at 16:04

## 2023-09-14 RX ADMIN — DIVALPROEX SODIUM 250 MG: 125 CAPSULE ORAL at 21:42

## 2023-09-14 RX ADMIN — PIPERACILLIN AND TAZOBACTAM 3375 MG: 3; .375 INJECTION, POWDER, LYOPHILIZED, FOR SOLUTION INTRAVENOUS at 21:49

## 2023-09-14 RX ADMIN — INSULIN GLARGINE 5 UNITS: 100 INJECTION, SOLUTION SUBCUTANEOUS at 21:43

## 2023-09-14 RX ADMIN — MORPHINE SULFATE 4 MG: 4 INJECTION, SOLUTION INTRAMUSCULAR; INTRAVENOUS at 04:30

## 2023-09-14 RX ADMIN — THERA TABS 1 TABLET: TAB at 09:21

## 2023-09-14 RX ADMIN — MORPHINE SULFATE 4 MG: 4 INJECTION, SOLUTION INTRAMUSCULAR; INTRAVENOUS at 23:49

## 2023-09-14 RX ADMIN — FOLIC ACID 1 MG: 1 TABLET ORAL at 09:21

## 2023-09-14 RX ADMIN — MIDODRINE HYDROCHLORIDE 5 MG: 5 TABLET ORAL at 17:35

## 2023-09-14 RX ADMIN — THIAMINE HYDROCHLORIDE 100 MG: 100 INJECTION, SOLUTION INTRAMUSCULAR; INTRAVENOUS at 09:21

## 2023-09-14 RX ADMIN — PIPERACILLIN AND TAZOBACTAM 3375 MG: 3; .375 INJECTION, POWDER, LYOPHILIZED, FOR SOLUTION INTRAVENOUS at 07:17

## 2023-09-14 RX ADMIN — MIDODRINE HYDROCHLORIDE 5 MG: 5 TABLET ORAL at 13:08

## 2023-09-14 RX ADMIN — MIDODRINE HYDROCHLORIDE 5 MG: 5 TABLET ORAL at 09:21

## 2023-09-14 RX ADMIN — FUROSEMIDE 40 MG: 10 INJECTION, SOLUTION INTRAMUSCULAR; INTRAVENOUS at 09:21

## 2023-09-14 RX ADMIN — Medication 10 ML: at 09:23

## 2023-09-14 RX ADMIN — FUROSEMIDE 40 MG: 10 INJECTION, SOLUTION INTRAMUSCULAR; INTRAVENOUS at 17:35

## 2023-09-14 RX ADMIN — RIFAXIMIN 550 MG: 550 TABLET ORAL at 09:21

## 2023-09-14 RX ADMIN — IPRATROPIUM BROMIDE 0.5 MG: 0.5 SOLUTION RESPIRATORY (INHALATION) at 21:11

## 2023-09-14 RX ADMIN — IPRATROPIUM BROMIDE 0.5 MG: 0.5 SOLUTION RESPIRATORY (INHALATION) at 12:04

## 2023-09-14 RX ADMIN — Medication 40 MG: at 21:42

## 2023-09-14 RX ADMIN — LACTULOSE 20 G: 20 SOLUTION ORAL at 09:21

## 2023-09-14 RX ADMIN — LACTULOSE 20 G: 20 SOLUTION ORAL at 21:42

## 2023-09-14 ASSESSMENT — PAIN SCALES - GENERAL
PAINLEVEL_OUTOF10: 0
PAINLEVEL_OUTOF10: 0
PAINLEVEL_OUTOF10: 8

## 2023-09-14 ASSESSMENT — PAIN SCALES - WONG BAKER
WONGBAKER_NUMERICALRESPONSE: 8

## 2023-09-14 NOTE — PROGRESS NOTES
[facility]  Palliative Care   Progress Note    NAME:  Bhupendra Fam  MEDICAL RECORD NUMBER:  [id number]  AGE: 76 y.o. GENDER: female  : 1955  TODAY'S DATE:  2023    Subjective: Patient pleasant today, eating small amounts, cooperating with staff, no complaints. Objective:    Vitals:    23 1204   BP:    Pulse: 57   Resp: 15   Temp:    SpO2: 100%     Lab Results   Component Value Date    WBC 6.3 2023    HGB 8.5 (L) 2023    HCT 26.3 (L) 2023    MCV 87.7 2023    PLT 52 (L) 2023     Lab Results   Component Value Date    CREATININE <0.5 (L) 2023    BUN 17 2023     (H) 2023    K 3.7 2023    CL 93 (L) 2023    CO2 >50 (HH) 2023     Lab Results   Component Value Date    ALT 21 2023    AST 51 (H) 2023    ALKPHOS 131 (H) 2023    BILITOT 3.0 (H) 2023       Plan: family at bedside, brother and sister, she ate some peaches, still not sure of discharge plan for patient waiting for her to improve medically and have feeding tube removed. Not sure if family would have PEG placed and with ascites most likely GI would not feel it is appropriate. Code Status: Limited  Discharge Environment:  [] Hospice Consult Agency:  [] Inpatient Hospice    [] Home with [address] [place] [address]   [] ECF with Hospice  [] ECF skilled care with Hospice to follow   [] Other:    Teaching Time:  0hours  30 min     I will continue to follow Ms. Dooley's care as needed. Thank you for allowing me to participate in the care of Ms. Virginia Cornejo .      Electronically signed by Girma Khan, RN, BSN,CHPN on 2023 at 12:58 PM  [address]  Office: [phone]

## 2023-09-14 NOTE — PROGRESS NOTES
Pulmonary/Critical Care Progress Note    Chest tube ordered to be removed. Site prepped and suture removed. Serous drainage noted in [facility]. No air leak noted. Chest tube removed without complication. Patient tolerated procedure well. RN at bedside.     NAVA Ramirez  [facility] [facility]

## 2023-09-14 NOTE — PROGRESS NOTES
[facility]   Speech Therapy  Daily Dysphagia Treatment Note  DISCHARGE SUMMARY    Jeffrey Dooley  AGE: 76 y.o. GENDER: female  : 1955  [id number]  EPISODE DATE:  9/3/2023    Patient Active Problem List   Diagnosis    Severe anemia    Severe malnutrition (HCC)    Acute deep vein thrombosis (DVT) of iliac vein of left lower extremity (HCC)    Iron deficiency anemia due to chronic blood loss    Pancolitis (HCC)    Symptomatic anemia    Upper GI bleed    Coffee ground emesis    Hematemesis    Acute blood loss anemia    Melena    Hypovolemic shock (HCC)    Hyperlipidemia    Alcoholic cirrhosis (HCC)    Portal hypertension (HCC)    Esophageal varices (HCC)    Chronic pancreatitis (HCC)    Postprocedural pneumothorax    Acute respiratory failure with hypoxia (HCC)     Allergies   Allergen Reactions    Tramadol Seizure       Treatment Diagnosis: Dysphagia     CHART REVIEW:  9/3/2023 admitted with c/o multiple episodes of coffee ground emesis  MD ADMISSION H&P HPI:  Pt is an 76y.o. year-old female with a history that includes a history of alcoholic cirrhosis with portal hypertension and known esophageal varices status post recent banding for upper GI bleed, chronic pancreatitis, history of DVT maintained on Eliquis and history of iron deficiency anemia now presents with complaints of upper GI bleed. Per GI, she underwent an upper endoscopy on  with findings of grade D reflux esophagitis, medium size esophageal varices with overlying severe esophagitis, minimal portal hypertensive gastropathy and a large 2 cm cratered duodenal ulcer with pigmented spots. The varices were ligated. She is due for repeat EGD with ligation in October per scheduling. She presents to the emergency room for evaluation after having multiple episodes of coffee-ground emesis today before she arrived in the emergency room.   In the emergency room she was found to have acute blood loss anemia with an initial hemoglobin SLP, results of assessment, recommendations and general speech pathology plan of care.    [] Pt verbalized understanding and agreement   [x] Pt requires ongoing learning   [] No evidence of comprehension     Dysphagia Prognosis: [x] good []fair []poor []guarded  Barriers to progress: comorbidities    Plan:     Continue Dysphagia Therapy: NO    Coded treatment time: 0  Total treatment time: 15    Electronically signed by   TRES Camarena    Intern   on 9/14/2023 at 1:00 PM

## 2023-09-14 NOTE — PROGRESS NOTES
V2.0    [facility] [place]      Name:  Annabella Leyden /Age/Sex: 1955  (77 y.o. female)   MRN & CSN:  [id number] & [id number] Encounter Date/Time: 2023 11:54 AM EDT   Location:  [id number] PCP: Aron Ahumada, RN, NP     Brannon Ramírez MD       Hospital Day: 12    Date of Admission: 9/3/2023    Assessment and Recommendations     Interval history:    Annabella Leyden is a 76 y.o. female with pmh of as below who presents with hematemesis. 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 Status post variceal , grade D reflux esophagitis noted, portal hypertensive gastropathy noted, no residual esophageal varices. Patient required blood transfusion intermittently for acute blood loss anemia, hemoglobin stabilized. Her she completed octreotide infusion, IV PPI infusion. Patient hospital course protracted secondary to acute encephalopathy which subsequently improved but patient developed worsening respiratory failure, per the discussion with the pulmonary team, patient elected to have limited code. Patient contemplating hospice      2023 Pt with worsening respiratory failure  - Worsening pneumothorax with increasing O2 demands - required chest tube placement the morning of 2023.   - Pulmonary managing chest tube  - Chest x-ray 2023 with pneumothorax. /  - Chest x-ray 2023 with hydropneumothorax  - Patient is refusing BiPAP, intubation. Limited code per the discussion with pulmonary team.  - Component of volume overload (+ 3.2 liters), continue IV Lasix    2023  - Pt with increased pain yesterday. Morphine increased  - NG tube was pulled out partially by th Pt.   New NG tube placed  - Pt with continued confusion.   - No longer on restraints    Aspiration pneumonitis, likely secondary to placement of chest tube in the right lung.  - Continue antibiotics in 03/04/2019 07:09 PM     Blood Cultures:   Lab Results   Component Value Date/Time    BC No Growth after 4 days of incubation. 09/07/2023 11:07 AM     Lab Results   Component Value Date/Time    BLOODCULT2 No Growth after 4 days of incubation.  09/07/2023 12:33 PM     Organism: No results found for: \"ORG\"      Electronically signed by Valeri Hillman MD on 9/14/2023 at 8:36 AM

## 2023-09-14 NOTE — CARE COORDINATION
Pt is not near dc ready, still awaiting medical clearances, and pt still remains confused and on restraints. SW will follow for dc planning needs, pending clearances on a clear plan for dc.      Reji Christian LMSW, [address] [facility] Social Work Case Management   Phone: [phone]  Fax: [phone]

## 2023-09-15 LAB
ALBUMIN SERPL-MCNC: 2.6 G/DL (ref 3.4–5)
AMMONIA PLAS-SCNC: 25 UMOL/L (ref 11–51)
ANION GAP SERPL CALCULATED.3IONS-SCNC: 4 MMOL/L (ref 3–16)
BACTERIA FLD AEROBE CULT: NORMAL
BUN SERPL-MCNC: 16 MG/DL (ref 7–20)
CALCIUM SERPL-MCNC: 8.4 MG/DL (ref 8.3–10.6)
CHLORIDE SERPL-SCNC: 87 MMOL/L (ref 99–110)
CO2 SERPL-SCNC: 49 MMOL/L (ref 21–32)
CREAT SERPL-MCNC: 0.6 MG/DL (ref 0.6–1.2)
DEPRECATED RDW RBC AUTO: 17.7 % (ref 12.4–15.4)
EST. AVERAGE GLUCOSE BLD GHB EST-MCNC: 119.8 MG/DL
GFR SERPLBLD CREATININE-BSD FMLA CKD-EPI: >60 ML/MIN/{1.73_M2}
GLUCOSE BLD-MCNC: 112 MG/DL (ref 70–99)
GLUCOSE BLD-MCNC: 245 MG/DL (ref 70–99)
GLUCOSE BLD-MCNC: 358 MG/DL (ref 70–99)
GLUCOSE BLD-MCNC: 70 MG/DL (ref 70–99)
GLUCOSE BLD-MCNC: 77 MG/DL (ref 70–99)
GLUCOSE SERPL-MCNC: 83 MG/DL (ref 70–99)
GRAM STN SPEC: NORMAL
HBA1C MFR BLD: 5.8 %
HCT VFR BLD AUTO: 24.1 % (ref 36–48)
HGB BLD-MCNC: 7.9 G/DL (ref 12–16)
MCH RBC QN AUTO: 28.8 PG (ref 26–34)
MCHC RBC AUTO-ENTMCNC: 32.9 G/DL (ref 31–36)
MCV RBC AUTO: 87.5 FL (ref 80–100)
PERFORMED ON: ABNORMAL
PERFORMED ON: NORMAL
PERFORMED ON: NORMAL
PHOSPHATE SERPL-MCNC: 3.3 MG/DL (ref 2.5–4.9)
PLATELET # BLD AUTO: 66 K/UL (ref 135–450)
PMV BLD AUTO: 9.6 FL (ref 5–10.5)
POTASSIUM SERPL-SCNC: 3.3 MMOL/L (ref 3.5–5.1)
RBC # BLD AUTO: 2.76 M/UL (ref 4–5.2)
SODIUM SERPL-SCNC: 140 MMOL/L (ref 136–145)
T4 FREE SERPL-MCNC: 0.8 NG/DL (ref 0.9–1.8)
WBC # BLD AUTO: 7 K/UL (ref 4–11)

## 2023-09-15 PROCEDURE — 2580000003 HC RX 258: Performed by: ANESTHESIOLOGY

## 2023-09-15 PROCEDURE — 99233 SBSQ HOSP IP/OBS HIGH 50: CPT | Performed by: INTERNAL MEDICINE

## 2023-09-15 PROCEDURE — 6370000000 HC RX 637 (ALT 250 FOR IP): Performed by: INTERNAL MEDICINE

## 2023-09-15 PROCEDURE — 85027 COMPLETE CBC AUTOMATED: CPT

## 2023-09-15 PROCEDURE — 6360000002 HC RX W HCPCS: Performed by: INTERNAL MEDICINE

## 2023-09-15 PROCEDURE — 6370000000 HC RX 637 (ALT 250 FOR IP): Performed by: HOSPITALIST

## 2023-09-15 PROCEDURE — 80069 RENAL FUNCTION PANEL: CPT

## 2023-09-15 PROCEDURE — 6370000000 HC RX 637 (ALT 250 FOR IP): Performed by: STUDENT IN AN ORGANIZED HEALTH CARE EDUCATION/TRAINING PROGRAM

## 2023-09-15 PROCEDURE — 84439 ASSAY OF FREE THYROXINE: CPT

## 2023-09-15 PROCEDURE — 97161 PT EVAL LOW COMPLEX 20 MIN: CPT

## 2023-09-15 PROCEDURE — 2580000003 HC RX 258: Performed by: HOSPITALIST

## 2023-09-15 PROCEDURE — 6360000002 HC RX W HCPCS: Performed by: HOSPITALIST

## 2023-09-15 PROCEDURE — C9113 INJ PANTOPRAZOLE SODIUM, VIA: HCPCS | Performed by: INTERNAL MEDICINE

## 2023-09-15 PROCEDURE — 2580000003 HC RX 258: Performed by: INTERNAL MEDICINE

## 2023-09-15 PROCEDURE — 97530 THERAPEUTIC ACTIVITIES: CPT

## 2023-09-15 PROCEDURE — 94761 N-INVAS EAR/PLS OXIMETRY MLT: CPT

## 2023-09-15 PROCEDURE — 2700000000 HC OXYGEN THERAPY PER DAY

## 2023-09-15 PROCEDURE — 83036 HEMOGLOBIN GLYCOSYLATED A1C: CPT

## 2023-09-15 PROCEDURE — 94640 AIRWAY INHALATION TREATMENT: CPT

## 2023-09-15 PROCEDURE — 82140 ASSAY OF AMMONIA: CPT

## 2023-09-15 PROCEDURE — 2060000000 HC ICU INTERMEDIATE R&B

## 2023-09-15 RX ORDER — GLUCOSAMINE HCL/CHONDROITIN SU 500-400 MG
CAPSULE ORAL
Qty: 100 STRIP | Refills: 3 | Status: SHIPPED | OUTPATIENT
Start: 2023-09-15 | End: 2023-09-18 | Stop reason: SDUPTHER

## 2023-09-15 RX ORDER — PANTOPRAZOLE SODIUM 40 MG/1
40 TABLET, DELAYED RELEASE ORAL
Qty: 60 TABLET | Refills: 3 | Status: SHIPPED | OUTPATIENT
Start: 2023-09-15

## 2023-09-15 RX ORDER — INSULIN GLARGINE 100 [IU]/ML
5 INJECTION, SOLUTION SUBCUTANEOUS NIGHTLY
Qty: 5 ADJUSTABLE DOSE PRE-FILLED PEN SYRINGE | Refills: 3 | Status: SHIPPED | OUTPATIENT
Start: 2023-09-15 | End: 2023-09-18 | Stop reason: SDUPTHER

## 2023-09-15 RX ORDER — PEN NEEDLE, DIABETIC 30 GX5/16"
1 NEEDLE, DISPOSABLE MISCELLANEOUS DAILY
Qty: 100 EACH | Refills: 3 | Status: SHIPPED | OUTPATIENT
Start: 2023-09-15 | End: 2023-09-18 | Stop reason: SDUPTHER

## 2023-09-15 RX ORDER — INSULIN LISPRO 100 [IU]/ML
0-8 INJECTION, SOLUTION INTRAVENOUS; SUBCUTANEOUS
Qty: 10 ML | Refills: 3 | Status: SHIPPED | OUTPATIENT
Start: 2023-09-15 | End: 2023-09-18 | Stop reason: SDUPTHER

## 2023-09-15 RX ORDER — ACETAZOLAMIDE 500 MG/5ML
500 INJECTION, POWDER, LYOPHILIZED, FOR SOLUTION INTRAVENOUS ONCE
Status: COMPLETED | OUTPATIENT
Start: 2023-09-15 | End: 2023-09-15

## 2023-09-15 RX ORDER — CARVEDILOL 6.25 MG/1
6.25 TABLET ORAL 2 TIMES DAILY WITH MEALS
Qty: 60 TABLET | Refills: 3 | Status: SHIPPED | OUTPATIENT
Start: 2023-09-15 | End: 2023-09-18 | Stop reason: SDUPTHER

## 2023-09-15 RX ORDER — INSULIN LISPRO 100 [IU]/ML
0-4 INJECTION, SOLUTION INTRAVENOUS; SUBCUTANEOUS NIGHTLY
Qty: 10 ML | Refills: 3 | Status: SHIPPED | OUTPATIENT
Start: 2023-09-15 | End: 2023-09-18 | Stop reason: SDUPTHER

## 2023-09-15 RX ORDER — SYRING-NEEDL,DISP,INSUL,0.3 ML 30 GX5/16"
1 SYRINGE, EMPTY DISPOSABLE MISCELLANEOUS SEE ADMIN INSTRUCTIONS
Qty: 100 EACH | Refills: 3 | Status: SHIPPED | OUTPATIENT
Start: 2023-09-15 | End: 2023-09-18 | Stop reason: SDUPTHER

## 2023-09-15 RX ADMIN — DIVALPROEX SODIUM 250 MG: 125 CAPSULE ORAL at 13:48

## 2023-09-15 RX ADMIN — RIFAXIMIN 550 MG: 550 TABLET ORAL at 22:12

## 2023-09-15 RX ADMIN — RIFAXIMIN 550 MG: 550 TABLET ORAL at 08:42

## 2023-09-15 RX ADMIN — IPRATROPIUM BROMIDE 0.5 MG: 0.5 SOLUTION RESPIRATORY (INHALATION) at 12:33

## 2023-09-15 RX ADMIN — Medication 10 ML: at 21:48

## 2023-09-15 RX ADMIN — THERA TABS 1 TABLET: TAB at 08:42

## 2023-09-15 RX ADMIN — PIPERACILLIN AND TAZOBACTAM 3375 MG: 3; .375 INJECTION, POWDER, LYOPHILIZED, FOR SOLUTION INTRAVENOUS at 05:20

## 2023-09-15 RX ADMIN — FOLIC ACID 1 MG: 1 TABLET ORAL at 08:33

## 2023-09-15 RX ADMIN — LORAZEPAM 0.26 MG: 2 INJECTION INTRAMUSCULAR; INTRAVENOUS at 12:06

## 2023-09-15 RX ADMIN — POTASSIUM CHLORIDE 20 MEQ: 29.8 INJECTION, SOLUTION INTRAVENOUS at 06:56

## 2023-09-15 RX ADMIN — INSULIN LISPRO 8 UNITS: 100 INJECTION, SOLUTION INTRAVENOUS; SUBCUTANEOUS at 17:29

## 2023-09-15 RX ADMIN — DIVALPROEX SODIUM 250 MG: 125 CAPSULE ORAL at 22:12

## 2023-09-15 RX ADMIN — PIPERACILLIN AND TAZOBACTAM 3375 MG: 3; .375 INJECTION, POWDER, LYOPHILIZED, FOR SOLUTION INTRAVENOUS at 22:09

## 2023-09-15 RX ADMIN — LACTULOSE 20 G: 20 SOLUTION ORAL at 08:33

## 2023-09-15 RX ADMIN — MIDODRINE HYDROCHLORIDE 5 MG: 5 TABLET ORAL at 17:30

## 2023-09-15 RX ADMIN — MIDODRINE HYDROCHLORIDE 5 MG: 5 TABLET ORAL at 11:41

## 2023-09-15 RX ADMIN — DIVALPROEX SODIUM 250 MG: 125 CAPSULE ORAL at 05:16

## 2023-09-15 RX ADMIN — FUROSEMIDE 40 MG: 10 INJECTION, SOLUTION INTRAMUSCULAR; INTRAVENOUS at 08:33

## 2023-09-15 RX ADMIN — IPRATROPIUM BROMIDE 0.5 MG: 0.5 SOLUTION RESPIRATORY (INHALATION) at 08:41

## 2023-09-15 RX ADMIN — MIDODRINE HYDROCHLORIDE 5 MG: 5 TABLET ORAL at 08:42

## 2023-09-15 RX ADMIN — PIPERACILLIN AND TAZOBACTAM 3375 MG: 3; .375 INJECTION, POWDER, LYOPHILIZED, FOR SOLUTION INTRAVENOUS at 13:54

## 2023-09-15 RX ADMIN — POTASSIUM CHLORIDE 20 MEQ: 29.8 INJECTION, SOLUTION INTRAVENOUS at 08:48

## 2023-09-15 RX ADMIN — ACETAZOLAMIDE 500 MG: 500 INJECTION, POWDER, LYOPHILIZED, FOR SOLUTION INTRAVENOUS at 11:41

## 2023-09-15 RX ADMIN — SODIUM CHLORIDE: 9 INJECTION, SOLUTION INTRAVENOUS at 22:08

## 2023-09-15 RX ADMIN — Medication 40 MG: at 08:33

## 2023-09-15 RX ADMIN — Medication 10 ML: at 08:51

## 2023-09-15 RX ADMIN — INSULIN GLARGINE 5 UNITS: 100 INJECTION, SOLUTION SUBCUTANEOUS at 23:27

## 2023-09-15 RX ADMIN — IPRATROPIUM BROMIDE 0.5 MG: 0.5 SOLUTION RESPIRATORY (INHALATION) at 19:31

## 2023-09-15 RX ADMIN — LACTULOSE 20 G: 20 SOLUTION ORAL at 23:41

## 2023-09-15 RX ADMIN — THIAMINE HYDROCHLORIDE 100 MG: 100 INJECTION, SOLUTION INTRAMUSCULAR; INTRAVENOUS at 08:42

## 2023-09-15 RX ADMIN — LACTULOSE 20 G: 20 SOLUTION ORAL at 13:48

## 2023-09-15 RX ADMIN — Medication 40 MG: at 21:48

## 2023-09-15 NOTE — PLAN OF CARE
Problem: Pain  Goal: Verbalizes/displays adequate comfort level or baseline comfort level  Outcome: Progressing     Problem: Safety - Adult  Goal: Free from fall injury  Outcome: Progressing     Problem: Skin/Tissue Integrity  Goal: Absence of new skin breakdown  Description: 1. Monitor for areas of redness and/or skin breakdown  2. Assess vascular access sites hourly  3. Every 4-6 hours minimum:  Change oxygen saturation probe site  4. Every 4-6 hours:  If on nasal continuous positive airway pressure, respiratory therapy assess nares and determine need for appliance change or resting period. Outcome: Progressing     Problem: ABCDS Injury Assessment  Goal: Absence of physical injury  Outcome: Progressing     Problem: Confusion  Goal: Confusion, delirium, dementia, or psychosis is improved or at baseline  Description: INTERVENTIONS:  1. Assess for possible contributors to thought disturbance, including medications, impaired vision or hearing, underlying metabolic abnormalities, dehydration, psychiatric diagnoses, and notify attending LIP  2. [place] high risk fall precautions, as indicated  3. Provide frequent short contacts to provide reality reorientation, refocusing and direction  4. Decrease environmental stimuli, including noise as appropriate  5. Monitor and intervene to maintain adequate nutrition, hydration, elimination, sleep and activity  6. If unable to ensure safety without constant attention obtain sitter and review sitter guidelines with assigned personnel  7.  Initiate Psychosocial CNS and Spiritual Care consult, as indicated  Outcome: Progressing     Problem: Neurosensory - Adult  Goal: Achieves stable or improved neurological status  Outcome: Progressing     Problem: Respiratory - Adult  Goal: Achieves optimal ventilation and oxygenation  Outcome: Progressing     Problem: Cardiovascular - Adult  Goal: Maintains optimal cardiac output and hemodynamic stability  Outcome: Progressing Problem: Genitourinary - Adult  Goal: Urinary catheter remains patent  Outcome: Progressing     Problem: Metabolic/Fluid and Electrolytes - Adult  Goal: Electrolytes maintained within normal limits  Outcome: Progressing     Problem: Hematologic - Adult  Goal: Maintains hematologic stability  Outcome: Progressing     Problem: Skin/Tissue Integrity - Adult  Goal: Skin integrity remains intact  Outcome: Progressing     Problem: Musculoskeletal - Adult  Goal: Return mobility to safest level of function  Outcome: Progressing  Goal: Return ADL status to a safe level of function  Outcome: Progressing     Problem: Gastrointestinal - Adult  Goal: Maintains or returns to baseline bowel function  Outcome: Progressing  Goal: Maintains adequate nutritional intake  Outcome: Progressing     Problem: Discharge Planning  Goal: Discharge to home or other facility with appropriate resources  Outcome: Progressing     Problem: Nutrition Deficit:  Goal: Optimize nutritional status  Outcome: Progressing   Alert and oriented at times and confused at times r/o with short term effect. Resp. Easy and even Sats. WNL on 4L O2  per n/c Lungs diminished in bases. Cough and deep breathing exercises [id number] Medicated x1 for c/o pain with good effect. NGT in place f/c intact draining clear yellow urine Cath.  Care php Frequently turned and repositioned Free from fall/injury

## 2023-09-15 NOTE — PROGRESS NOTES
Comprehensive Nutrition Assessment    Type and Reason for Visit:  Reassess    Nutrition Recommendations/Plan:   Continue on easy to chew diet  Add Ensure Compact tid  Monitor intake  Monitor pertinent labs       Malnutrition Assessment:  Malnutrition Status:  Severe malnutrition (09/08/23 0901)    Context:  Chronic Illness     Findings of the 6 clinical characteristics of malnutrition:  Energy Intake:  Unable to assess  Weight Loss:  Unable to assess (fluid shifts)     Body Fat Loss:  Severe body fat loss Orbital, Triceps   Muscle Mass Loss:  Severe muscle mass loss Temples (temporalis), Clavicles (pectoralis & deltoids), Scapula (trapezius), Hand (interosseous)  Fluid Accumulation:  Mild Extremities   Strength:  Not Performed    Nutrition Assessment:    Follow up. Dhest tube has been removed. NG tube pulled last night, partially by pt and determined to leave it out. Pt is on easy to chew diet and only meal recorded was 26-50%. Confusion is somewhat improved with no longer needing restraints. Ascites remains with GI determining not to do decompression at this time. Wt is trending down. Pt is considering Hospice care. Nutrition Related Findings:    K+ 3.3, generalized non pitting edema, BM 9/13 Wound Type: None       Current Nutrition Intake & Therapies:    Average Meal Intake: 26-50%  Average Supplements Intake: None Ordered  ADULT TUBE FEEDING; Nasogastric; Standard with Fiber; Continuous; 20; Yes; 20; Q 8 hours; 55; 30; Q 4 hours  ADULT DIET; Easy to Chew  ADULT ORAL NUTRITION SUPPLEMENT; Breakfast, Lunch, Dinner; Standard 4 oz Oral Supplement    Anthropometric Measures:  Height: 5' 2\" (157.5 cm)  Ideal Body Weight (IBW): 110 lbs (50 kg)    Admission Body Weight: 86 lb (39 kg)  Current Body Weight: 95 lb 14.4 oz (43.5 kg),   IBW.  Weight Source: Bed Scale  Current BMI (kg/m2): 17.5  Usual Body Weight:  (per records wt generally high 70s to 80s.)                       BMI Categories: Underweight (BMI less than 22) age over 72    Estimated Daily Nutrient Needs:        Energy (kcal/day): 9601-6801 (35-40 x ABW 48 kg)     Protein (g/day): 38-72 (.8-1.5 x ABW 48 kg (adj for hepatic disease)     Fluid (ml/day): per provider    Nutrition Diagnosis:   Severe malnutrition related to inadequate protein-energy intake as evidenced by Criteria as identified in malnutrition assessment    Nutrition Interventions:   Food and/or Nutrient Delivery: Continue Current Diet, Start Oral Nutrition Supplement  Nutrition Education/Counseling: No recommendation at this time  Coordination of Nutrition Care: Continue to monitor while inpatient       Goals:     Goals: PO intake 50% or greater, by next RD assessment       Nutrition Monitoring and Evaluation:   Behavioral-Environmental Outcomes: None Identified  Food/Nutrient Intake Outcomes: Food and Nutrient Intake, Supplement Intake  Physical Signs/Symptoms Outcomes: Biochemical Data, Fluid Status or Edema, Nutrition Focused Physical Findings, Weight    Discharge Planning:     Too soon to determine     Estuardo [facility] [address] [facility] [place], [place],   Contact: [phone]

## 2023-09-15 NOTE — PROGRESS NOTES
Occupational Therapy Attempt  Vinnie Servin    Attempted to see pt for OT initial evaluation. Per RN pt just got back to bed, incontinent of bowel. OT assisted RN to clean pt up in the bed - total A for hygiene and briefs/linens change, pt able to roll and bridge hips w/ max cueing. Will follow up next date for formal evaluation.     Total time: 8 mins    Electronically signed by Nicol Larsen OT on 9/15/23 at 2:55 PM EDT

## 2023-09-15 NOTE — PLAN OF CARE
Problem: Pain  Goal: Verbalizes/displays adequate comfort level or baseline comfort level  9/15/2023 1047 by Sandi Crandall RN  Outcome: Progressing  9/15/2023 0102 by Chloe Horton RN  Outcome: Progressing     Problem: Safety - Adult  Goal: Free from fall injury  9/15/2023 1047 by Sandi Crandall RN  Outcome: Progressing  9/15/2023 0102 by Chloe Horton RN  Outcome: Progressing     Problem: Skin/Tissue Integrity  Goal: Absence of new skin breakdown  Description: 1. Monitor for areas of redness and/or skin breakdown  2. Assess vascular access sites hourly  3. Every 4-6 hours minimum:  Change oxygen saturation probe site  4. Every 4-6 hours:  If on nasal continuous positive airway pressure, respiratory therapy assess nares and determine need for appliance change or resting period. 9/15/2023 1047 by Sandi Crandall RN  Outcome: Progressing  9/15/2023 0102 by Chloe Horton RN  Outcome: Progressing     Problem: ABCDS Injury Assessment  Goal: Absence of physical injury  9/15/2023 1047 by Sandi Crandall RN  Outcome: Progressing  9/15/2023 0102 by Chloe Horton RN  Outcome: Progressing     Problem: Confusion  Goal: Confusion, delirium, dementia, or psychosis is improved or at baseline  Description: INTERVENTIONS:  1. Assess for possible contributors to thought disturbance, including medications, impaired vision or hearing, underlying metabolic abnormalities, dehydration, psychiatric diagnoses, and notify attending LIP  2. [place] high risk fall precautions, as indicated  3. Provide frequent short contacts to provide reality reorientation, refocusing and direction  4. Decrease environmental stimuli, including noise as appropriate  5. Monitor and intervene to maintain adequate nutrition, hydration, elimination, sleep and activity  6. If unable to ensure safety without constant attention obtain sitter and review sitter guidelines with assigned personnel  7.  Initiate Psychosocial CNS and Spiritual Care consult, as indicated  9/15/2023 1047 by Elie Vogel RN  Outcome: Progressing  9/15/2023 0102 by Adrian Mckeon RN  Outcome: Progressing     Problem: Neurosensory - Adult  Goal: Achieves stable or improved neurological status  9/15/2023 1047 by Elie Vogel RN  Outcome: Progressing  9/15/2023 0102 by Adrian Mckeon RN  Outcome: Progressing     Problem: Respiratory - Adult  Goal: Achieves optimal ventilation and oxygenation  9/15/2023 1047 by Elie Vogel RN  Outcome: Progressing  9/15/2023 0102 by Adrian Mckeon RN  Outcome: Progressing     Problem: Cardiovascular - Adult  Goal: Maintains optimal cardiac output and hemodynamic stability  9/15/2023 1047 by Elie Vogel RN  Outcome: Progressing  9/15/2023 0102 by Adrian Mckeon RN  Outcome: Progressing     Problem: Genitourinary - Adult  Goal: Urinary catheter remains patent  9/15/2023 1047 by Elie Vogel RN  Outcome: Progressing  9/15/2023 0102 by Adrian Mckeon RN  Outcome: Progressing     Problem: Metabolic/Fluid and Electrolytes - Adult  Goal: Electrolytes maintained within normal limits  9/15/2023 1047 by Elie Vogel RN  Outcome: Progressing  9/15/2023 0102 by Adrian Mckeon RN  Outcome: Progressing     Problem: Hematologic - Adult  Goal: Maintains hematologic stability  9/15/2023 1047 by Elie Vogel RN  Outcome: Progressing  9/15/2023 0102 by Adrian Mckeon RN  Outcome: Progressing     Problem: Skin/Tissue Integrity - Adult  Goal: Skin integrity remains intact  9/15/2023 1047 by Elie Vogel RN  Outcome: Progressing  9/15/2023 0102 by Adrian Mckeon RN  Outcome: Progressing     Problem: Musculoskeletal - Adult  Goal: Return mobility to safest level of function  9/15/2023 1047 by Elie Vogel RN  Outcome: Progressing  9/15/2023 0102 by Adrian Mckeon RN  Outcome: Progressing  Goal: Return ADL status to a safe level of function  9/15/2023 1047 by Elie Vogel RN  Outcome: Progressing  9/15/2023 0102 by Adrian Mckeon RN  Outcome:

## 2023-09-15 NOTE — PROGRESS NOTES
training, Positioning  Safety Devices  Type of Devices: All fall risk precautions in place, Call light within reach, Chair alarm in place, Left in chair, Nurse notified  Restraints  Restraints Initially in Place: No     Restrictions  Restrictions/Precautions  Restrictions/Precautions: Fall Risk  Position Activity Restriction  Other position/activity restrictions: O2     Subjective   General  Chart Reviewed: Yes  Additional Pertinent Hx: \"Jeffrey Stokes is a 76 y.o. female with pmh of as below who presents with hematemesis. 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 Status post variceal , grade D reflux esophagitis noted, portal hypertensive gastropathy noted, no residual esophageal varices. Patient required blood transfusion intermittently for acute blood loss anemia, hemoglobin stabilized. Her she completed octreotide infusion, IV PPI infusion. Patient hospital course protracted secondary to acute encephalopathy which subsequently improved but patient developed worsening respiratory failure, per the discussion with the pulmonary team, patient elected to have limited code. Patient contemplating hospice. \"  Response To Previous Treatment: Not applicable  Referring Practitioner: Asmita Rosado MD  Referral Date : 09/15/23  Diagnosis: Aspiration pneumonitis; Pneumothorax; Cirrhosis; Encephalopathy  Follows Commands: Within Functional Limits  Subjective  Subjective: Agreeable to activity.          Social/Functional History  Social/Functional History  Lives With: Alone  Type of Home: Apartment  Home Layout: One level  Home Access: Level entry, Elevator  Bathroom Equipment: Shower chair  Home Equipment: Teresa Dipesh, rolling, Rollator  ADL Assistance: Independent  Ambulation Assistance: Independent (Rollator)  Transfer Assistance: Independent  Active : No  Additional Comments: Brand-new

## 2023-09-15 NOTE — CARE COORDINATION
VIRGINIE sent referrals to:    [facility]isa [facility]. [address] [place] [address]      Pt does not require precert, need accepting SNF.      VIRGINIE will follow,     Mariza Black, SCOT, [address] [facility] Social Work Case Management   Phone: [phone]  Fax: [phone]

## 2023-09-15 NOTE — PROGRESS NOTES
Patient being paronoid refusing to keep BIPAP on saying settings are not right I showed her settings on BIPAP and looked at the settings she has wrote down and explained that they were the same and offered to put back on but she angelo.  To refuse

## 2023-09-15 NOTE — CARE COORDINATION
09/15/23 1536   IMM Letter   IMM Letter given to Patient/Family/Significant other/Guardian/POA/by: To patient's son Ravinder Draper, by Etta Seip, LMSW   IMM Letter date given: 09/15/23   IMM Letter time given: 1529     Provided to patient . .. by Electronically signed by MICHELLE Francois on 9/15/2023 at 3:38 PM. Education provided to patient, patient reported no questions and verbalized understanding. Patient aware of 4 hours allotted time to determine if they choose to pursue Medicare appeal process.      Buck Bullock LMSW, [facility] Case Management   Phone: [phone]  Fax: [phone]

## 2023-09-15 NOTE — CARE COORDINATION
VIRGINIE met with pt, pt's sister Ute Rincon and son Mary Avila, and went over SNF recommendation from PT/OT. VIRGINIE provided the list, and informed that MD is looking to dc pt on Sunday, and pt does not require a precert. Mary Avila will look over the list and call Kate Iraheta informed that phone will be forwarded to the oncall VIRGINIE for the weekend, so that referrals can be placed.      VIRGINIE will follow,  Navi Covington LMSW, [address] [facility] Social Work Case Management   Phone: [phone]  Fax: [phone]

## 2023-09-15 NOTE — PROGRESS NOTES
V2.0    [facility] Progress Note      Name:  Rayshawn Del Real /Age/Sex: 1955  (77 y.o. female)   MRN & CSN:  [id number] & [id number] Encounter Date/Time: 9/15/2023 11:54 AM EDT   Location:  [id number]/5275-01 PCP: Antonette Santillan RN, NP     Bruce Kelly MD       Hospital Day: 13    Date of Admission: 9/3/2023    Assessment and Recommendations     Interval history:    Rayshawn Del Real is a 76 y.o. female with pmh of as below who presents with hematemesis. 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 Status post variceal , grade D reflux esophagitis noted, portal hypertensive gastropathy noted, no residual esophageal varices. Patient required blood transfusion intermittently for acute blood loss anemia, hemoglobin stabilized. Her she completed octreotide infusion, IV PPI infusion. Patient hospital course protracted secondary to acute encephalopathy which subsequently improved but patient developed worsening respiratory failure, per the discussion with the pulmonary team, patient elected to have limited code. Patient contemplating hospice      09/15/2023  - Pneumothorax - Resolved  - Improved mentation. No longer requiring restraints  - NG removed and Pt is tolerating oral intake  - Pulmonary anticipates she will be ready for discharge on   '  2023  - Pt with increased pain yesterday. Morphine increased  - NG tube was pulled out partially by th Pt. New NG tube placed  - Pt with continued confusion.   - No longer on restraints    2023 Pt with worsening respiratory failure  - Worsening pneumothorax with increasing O2 demands - required chest tube placement the morning of 2023.   - Pulmonary managing chest tube  - Chest x-ray 2023 with pneumothorax. /  - Chest x-ray 2023 with hydropneumothorax  - Patient is refusing BiPAP, intubation.   Limited code per the

## 2023-09-15 NOTE — CARE COORDINATION
SW met with pt and attempted to go over choice list for SNF, as pt was recommended for SNF. Pt is still confused, and actually confused SW with someone else. Brother was present, and was on his way out, stating that her son Lesly Aguilar will have to help her with this decision. Pt wanted to go home to her apt that is new, that her son Lesly Aguilar got for her. SW left  for Lesly Aguilar again.      Luke Bustos LMSW, [address] [facility] Social Work Case Management   Phone: [phone]  Fax: [phone]

## 2023-09-16 LAB
ALBUMIN SERPL-MCNC: 2.6 G/DL (ref 3.4–5)
AMMONIA PLAS-SCNC: 45 UMOL/L (ref 11–51)
ANION GAP SERPL CALCULATED.3IONS-SCNC: 2 MMOL/L (ref 3–16)
BUN SERPL-MCNC: 12 MG/DL (ref 7–20)
CALCIUM SERPL-MCNC: 8.5 MG/DL (ref 8.3–10.6)
CHLORIDE SERPL-SCNC: 92 MMOL/L (ref 99–110)
CO2 SERPL-SCNC: 45 MMOL/L (ref 21–32)
CREAT SERPL-MCNC: 0.7 MG/DL (ref 0.6–1.2)
DEPRECATED RDW RBC AUTO: 17.8 % (ref 12.4–15.4)
GFR SERPLBLD CREATININE-BSD FMLA CKD-EPI: >60 ML/MIN/{1.73_M2}
GLUCOSE BLD-MCNC: 142 MG/DL (ref 70–99)
GLUCOSE BLD-MCNC: 146 MG/DL (ref 70–99)
GLUCOSE BLD-MCNC: 156 MG/DL (ref 70–99)
GLUCOSE BLD-MCNC: 83 MG/DL (ref 70–99)
GLUCOSE SERPL-MCNC: 77 MG/DL (ref 70–99)
HCT VFR BLD AUTO: 22.4 % (ref 36–48)
HGB BLD-MCNC: 7.3 G/DL (ref 12–16)
MCH RBC QN AUTO: 28.8 PG (ref 26–34)
MCHC RBC AUTO-ENTMCNC: 32.7 G/DL (ref 31–36)
MCV RBC AUTO: 88.2 FL (ref 80–100)
PERFORMED ON: ABNORMAL
PERFORMED ON: NORMAL
PHOSPHATE SERPL-MCNC: 4.2 MG/DL (ref 2.5–4.9)
PLATELET # BLD AUTO: 65 K/UL (ref 135–450)
PMV BLD AUTO: 9.6 FL (ref 5–10.5)
POTASSIUM SERPL-SCNC: 3.2 MMOL/L (ref 3.5–5.1)
RBC # BLD AUTO: 2.54 M/UL (ref 4–5.2)
SODIUM SERPL-SCNC: 139 MMOL/L (ref 136–145)
WBC # BLD AUTO: 4.6 K/UL (ref 4–11)

## 2023-09-16 PROCEDURE — 6360000002 HC RX W HCPCS: Performed by: HOSPITALIST

## 2023-09-16 PROCEDURE — 2580000003 HC RX 258: Performed by: HOSPITALIST

## 2023-09-16 PROCEDURE — 6370000000 HC RX 637 (ALT 250 FOR IP): Performed by: INTERNAL MEDICINE

## 2023-09-16 PROCEDURE — 6370000000 HC RX 637 (ALT 250 FOR IP): Performed by: HOSPITALIST

## 2023-09-16 PROCEDURE — 2580000003 HC RX 258: Performed by: INTERNAL MEDICINE

## 2023-09-16 PROCEDURE — 6360000002 HC RX W HCPCS: Performed by: INTERNAL MEDICINE

## 2023-09-16 PROCEDURE — 85027 COMPLETE CBC AUTOMATED: CPT

## 2023-09-16 PROCEDURE — 94640 AIRWAY INHALATION TREATMENT: CPT

## 2023-09-16 PROCEDURE — C9113 INJ PANTOPRAZOLE SODIUM, VIA: HCPCS | Performed by: INTERNAL MEDICINE

## 2023-09-16 PROCEDURE — 2060000000 HC ICU INTERMEDIATE R&B

## 2023-09-16 PROCEDURE — 36592 COLLECT BLOOD FROM PICC: CPT

## 2023-09-16 PROCEDURE — 6370000000 HC RX 637 (ALT 250 FOR IP): Performed by: STUDENT IN AN ORGANIZED HEALTH CARE EDUCATION/TRAINING PROGRAM

## 2023-09-16 PROCEDURE — 94761 N-INVAS EAR/PLS OXIMETRY MLT: CPT

## 2023-09-16 PROCEDURE — 2700000000 HC OXYGEN THERAPY PER DAY

## 2023-09-16 PROCEDURE — 80069 RENAL FUNCTION PANEL: CPT

## 2023-09-16 PROCEDURE — 99232 SBSQ HOSP IP/OBS MODERATE 35: CPT | Performed by: INTERNAL MEDICINE

## 2023-09-16 PROCEDURE — 2580000003 HC RX 258: Performed by: ANESTHESIOLOGY

## 2023-09-16 PROCEDURE — 82140 ASSAY OF AMMONIA: CPT

## 2023-09-16 RX ORDER — 0.9 % SODIUM CHLORIDE 0.9 %
500 INTRAVENOUS SOLUTION INTRAVENOUS ONCE
Status: COMPLETED | OUTPATIENT
Start: 2023-09-16 | End: 2023-09-16

## 2023-09-16 RX ADMIN — LACTULOSE 20 G: 20 SOLUTION ORAL at 15:33

## 2023-09-16 RX ADMIN — DIVALPROEX SODIUM 250 MG: 125 CAPSULE ORAL at 23:57

## 2023-09-16 RX ADMIN — Medication 10 ML: at 23:52

## 2023-09-16 RX ADMIN — RIFAXIMIN 550 MG: 550 TABLET ORAL at 08:38

## 2023-09-16 RX ADMIN — SODIUM CHLORIDE 500 ML: 9 INJECTION, SOLUTION INTRAVENOUS at 17:38

## 2023-09-16 RX ADMIN — POTASSIUM CHLORIDE 20 MEQ: 29.8 INJECTION, SOLUTION INTRAVENOUS at 09:12

## 2023-09-16 RX ADMIN — Medication 40 MG: at 23:52

## 2023-09-16 RX ADMIN — IPRATROPIUM BROMIDE 0.5 MG: 0.5 SOLUTION RESPIRATORY (INHALATION) at 08:10

## 2023-09-16 RX ADMIN — LACTULOSE 20 G: 20 SOLUTION ORAL at 08:38

## 2023-09-16 RX ADMIN — RIFAXIMIN 550 MG: 550 TABLET ORAL at 23:57

## 2023-09-16 RX ADMIN — DIVALPROEX SODIUM 250 MG: 125 CAPSULE ORAL at 15:33

## 2023-09-16 RX ADMIN — INSULIN GLARGINE 5 UNITS: 100 INJECTION, SOLUTION SUBCUTANEOUS at 23:56

## 2023-09-16 RX ADMIN — PIPERACILLIN AND TAZOBACTAM 3375 MG: 3; .375 INJECTION, POWDER, LYOPHILIZED, FOR SOLUTION INTRAVENOUS at 14:38

## 2023-09-16 RX ADMIN — THIAMINE HYDROCHLORIDE 100 MG: 100 INJECTION, SOLUTION INTRAMUSCULAR; INTRAVENOUS at 08:38

## 2023-09-16 RX ADMIN — POTASSIUM CHLORIDE 20 MEQ: 29.8 INJECTION, SOLUTION INTRAVENOUS at 10:13

## 2023-09-16 RX ADMIN — DIVALPROEX SODIUM 250 MG: 125 CAPSULE ORAL at 06:20

## 2023-09-16 RX ADMIN — MIDODRINE HYDROCHLORIDE 5 MG: 5 TABLET ORAL at 08:38

## 2023-09-16 RX ADMIN — FOLIC ACID 1 MG: 1 TABLET ORAL at 08:38

## 2023-09-16 RX ADMIN — HALOPERIDOL LACTATE 2 MG: 5 INJECTION, SOLUTION INTRAMUSCULAR at 16:39

## 2023-09-16 RX ADMIN — THERA TABS 1 TABLET: TAB at 08:38

## 2023-09-16 RX ADMIN — Medication 40 MG: at 08:38

## 2023-09-16 RX ADMIN — PIPERACILLIN AND TAZOBACTAM 3375 MG: 3; .375 INJECTION, POWDER, LYOPHILIZED, FOR SOLUTION INTRAVENOUS at 06:19

## 2023-09-16 NOTE — PROGRESS NOTES
Occupational Therapy      OT order received and chart reviewed. Attempted to see pt for OT evaluation. Pt sleeping with blanket over head as therapist entered room and required several attempts to arouse patient. Pt declining therapy at this time stating she does not feel well. Pt with lunch tray at bedside- offered to assist pt to chair to eat lunch and pt declined. Multiple activities offered in attempt to engage pt in activity and pt continued to declined. Will follow up as schedule and pt condition permit.     Electronically signed by Josh Ochoa OT on 9/16/2023 at 1:25 PM

## 2023-09-16 NOTE — PROGRESS NOTES
V2.0    [facility] [place] Progress Note      Name:  Vinnie Servin /Age/Sex: 1955  (77 y.o. female)   MRN & CSN:  [id number] & [id number] Encounter Date/Time: 2023 11:54 AM EDT   Location:  [id number]/5275-01 PCP: Basilio Pozo RN, NP     Yaz Duke MD       Hospital Day: 14    Date of Admission: 9/3/2023    Assessment and Recommendations     Interval history:    Vinnie Servin is a 76 y.o. female with pmh of as below who presents with hematemesis. 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 Status post variceal , grade D reflux esophagitis noted, portal hypertensive gastropathy noted, no residual esophageal varices. Patient required blood transfusion intermittently for acute blood loss anemia, hemoglobin stabilized. Her she completed octreotide infusion, IV PPI infusion. Patient hospital course protracted secondary to acute encephalopathy which subsequently improved but patient developed worsening respiratory failure, per the discussion with the pulmonary team, patient elected to have limited code. Patient contemplating hospice    2023  - Pt sleeping with blanket over head  - She states that she wants to be left alone for now  - She has no new complaints this morning    09/15/2023  - Pneumothorax - Resolved  - Improved mentation. No longer requiring restraints  - NG removed and Pt is tolerating oral intake  - Pulmonary anticipates she will be ready for discharge on   '  2023  - Pt with increased pain yesterday. Morphine increased  - NG tube was pulled out partially by th Pt.   New NG tube placed  - Pt with continued confusion.   - No longer on restraints    2023 Pt with worsening respiratory failure  - Worsening pneumothorax with increasing O2 demands - required chest tube placement the morning of 2023.   - Pulmonary managing chest tube  - Chest x-ray

## 2023-09-16 NOTE — PLAN OF CARE
Problem: Pain  Goal: Verbalizes/displays adequate comfort level or baseline comfort level  Outcome: Progressing     Problem: Safety - Adult  Goal: Free from fall injury  Outcome: Progressing     Problem: Skin/Tissue Integrity  Goal: Absence of new skin breakdown  Description: 1. Monitor for areas of redness and/or skin breakdown  2. Assess vascular access sites hourly  3. Every 4-6 hours minimum:  Change oxygen saturation probe site  4. Every 4-6 hours:  If on nasal continuous positive airway pressure, respiratory therapy assess nares and determine need for appliance change or resting period. Outcome: Progressing     Problem: ABCDS Injury Assessment  Goal: Absence of physical injury  Outcome: Progressing     Problem: Confusion  Goal: Confusion, delirium, dementia, or psychosis is improved or at baseline  Description: INTERVENTIONS:  1. Assess for possible contributors to thought disturbance, including medications, impaired vision or hearing, underlying metabolic abnormalities, dehydration, psychiatric diagnoses, and notify attending LIP  2. [place] high risk fall precautions, as indicated  3. Provide frequent short contacts to provide reality reorientation, refocusing and direction  4. Decrease environmental stimuli, including noise as appropriate  5. Monitor and intervene to maintain adequate nutrition, hydration, elimination, sleep and activity  6. If unable to ensure safety without constant attention obtain sitter and review sitter guidelines with assigned personnel  7.  Initiate Psychosocial CNS and Spiritual Care consult, as indicated  Outcome: Progressing     Problem: Respiratory - Adult  Goal: Achieves optimal ventilation and oxygenation  Outcome: Progressing     Problem: Cardiovascular - Adult  Goal: Maintains optimal cardiac output and hemodynamic stability  Outcome: Progressing     Problem: Gastrointestinal - Adult  Goal: Maintains or returns to baseline bowel function  Outcome: Progressing  Goal:

## 2023-09-17 LAB
ALBUMIN SERPL-MCNC: 2.7 G/DL (ref 3.4–5)
ANION GAP SERPL CALCULATED.3IONS-SCNC: 3 MMOL/L (ref 3–16)
BUN SERPL-MCNC: 11 MG/DL (ref 7–20)
CALCIUM SERPL-MCNC: 8.4 MG/DL (ref 8.3–10.6)
CHLORIDE SERPL-SCNC: 96 MMOL/L (ref 99–110)
CO2 SERPL-SCNC: 39 MMOL/L (ref 21–32)
CREAT SERPL-MCNC: 0.6 MG/DL (ref 0.6–1.2)
DEPRECATED RDW RBC AUTO: 18.6 % (ref 12.4–15.4)
GFR SERPLBLD CREATININE-BSD FMLA CKD-EPI: >60 ML/MIN/{1.73_M2}
GLUCOSE BLD-MCNC: 100 MG/DL (ref 70–99)
GLUCOSE BLD-MCNC: 305 MG/DL (ref 70–99)
GLUCOSE BLD-MCNC: 318 MG/DL (ref 70–99)
GLUCOSE BLD-MCNC: 72 MG/DL (ref 70–99)
GLUCOSE SERPL-MCNC: 74 MG/DL (ref 70–99)
HCT VFR BLD AUTO: 25.6 % (ref 36–48)
HGB BLD-MCNC: 8.3 G/DL (ref 12–16)
MCH RBC QN AUTO: 28.6 PG (ref 26–34)
MCHC RBC AUTO-ENTMCNC: 32.2 G/DL (ref 31–36)
MCV RBC AUTO: 88.8 FL (ref 80–100)
PERFORMED ON: ABNORMAL
PERFORMED ON: NORMAL
PHOSPHATE SERPL-MCNC: 3.1 MG/DL (ref 2.5–4.9)
PLATELET # BLD AUTO: 71 K/UL (ref 135–450)
PMV BLD AUTO: 9.4 FL (ref 5–10.5)
POTASSIUM SERPL-SCNC: 3.6 MMOL/L (ref 3.5–5.1)
RBC # BLD AUTO: 2.89 M/UL (ref 4–5.2)
SODIUM SERPL-SCNC: 138 MMOL/L (ref 136–145)
WBC # BLD AUTO: 4.3 K/UL (ref 4–11)

## 2023-09-17 PROCEDURE — 2580000003 HC RX 258: Performed by: ANESTHESIOLOGY

## 2023-09-17 PROCEDURE — 6370000000 HC RX 637 (ALT 250 FOR IP): Performed by: INTERNAL MEDICINE

## 2023-09-17 PROCEDURE — 6360000002 HC RX W HCPCS: Performed by: INTERNAL MEDICINE

## 2023-09-17 PROCEDURE — C9113 INJ PANTOPRAZOLE SODIUM, VIA: HCPCS | Performed by: INTERNAL MEDICINE

## 2023-09-17 PROCEDURE — 6360000002 HC RX W HCPCS: Performed by: HOSPITALIST

## 2023-09-17 PROCEDURE — 36592 COLLECT BLOOD FROM PICC: CPT

## 2023-09-17 PROCEDURE — 2060000000 HC ICU INTERMEDIATE R&B

## 2023-09-17 PROCEDURE — 2580000003 HC RX 258: Performed by: INTERNAL MEDICINE

## 2023-09-17 PROCEDURE — 85027 COMPLETE CBC AUTOMATED: CPT

## 2023-09-17 PROCEDURE — 80069 RENAL FUNCTION PANEL: CPT

## 2023-09-17 PROCEDURE — 2700000000 HC OXYGEN THERAPY PER DAY

## 2023-09-17 PROCEDURE — 6370000000 HC RX 637 (ALT 250 FOR IP): Performed by: HOSPITALIST

## 2023-09-17 PROCEDURE — P9047 ALBUMIN (HUMAN), 25%, 50ML: HCPCS | Performed by: INTERNAL MEDICINE

## 2023-09-17 PROCEDURE — 2580000003 HC RX 258: Performed by: HOSPITALIST

## 2023-09-17 PROCEDURE — 6370000000 HC RX 637 (ALT 250 FOR IP): Performed by: STUDENT IN AN ORGANIZED HEALTH CARE EDUCATION/TRAINING PROGRAM

## 2023-09-17 PROCEDURE — 94761 N-INVAS EAR/PLS OXIMETRY MLT: CPT

## 2023-09-17 PROCEDURE — 2580000003 HC RX 258: Performed by: NURSE PRACTITIONER

## 2023-09-17 RX ORDER — ALBUMIN (HUMAN) 12.5 G/50ML
25 SOLUTION INTRAVENOUS ONCE
Status: COMPLETED | OUTPATIENT
Start: 2023-09-17 | End: 2023-09-17

## 2023-09-17 RX ORDER — SODIUM CHLORIDE, SODIUM LACTATE, POTASSIUM CHLORIDE, AND CALCIUM CHLORIDE .6; .31; .03; .02 G/100ML; G/100ML; G/100ML; G/100ML
1000 INJECTION, SOLUTION INTRAVENOUS ONCE
Status: COMPLETED | OUTPATIENT
Start: 2023-09-17 | End: 2023-09-17

## 2023-09-17 RX ADMIN — DIVALPROEX SODIUM 250 MG: 125 CAPSULE ORAL at 05:34

## 2023-09-17 RX ADMIN — FUROSEMIDE 40 MG: 10 INJECTION, SOLUTION INTRAMUSCULAR; INTRAVENOUS at 18:15

## 2023-09-17 RX ADMIN — FOLIC ACID 1 MG: 1 TABLET ORAL at 09:08

## 2023-09-17 RX ADMIN — DIVALPROEX SODIUM 250 MG: 125 CAPSULE ORAL at 21:48

## 2023-09-17 RX ADMIN — Medication 40 MG: at 21:48

## 2023-09-17 RX ADMIN — PIPERACILLIN AND TAZOBACTAM 3375 MG: 3; .375 INJECTION, POWDER, LYOPHILIZED, FOR SOLUTION INTRAVENOUS at 05:51

## 2023-09-17 RX ADMIN — MIDODRINE HYDROCHLORIDE 5 MG: 5 TABLET ORAL at 09:08

## 2023-09-17 RX ADMIN — PIPERACILLIN AND TAZOBACTAM 3375 MG: 3; .375 INJECTION, POWDER, LYOPHILIZED, FOR SOLUTION INTRAVENOUS at 00:00

## 2023-09-17 RX ADMIN — Medication 10 ML: at 21:49

## 2023-09-17 RX ADMIN — PIPERACILLIN AND TAZOBACTAM 3375 MG: 3; .375 INJECTION, POWDER, LYOPHILIZED, FOR SOLUTION INTRAVENOUS at 14:46

## 2023-09-17 RX ADMIN — INSULIN LISPRO 6 UNITS: 100 INJECTION, SOLUTION INTRAVENOUS; SUBCUTANEOUS at 18:15

## 2023-09-17 RX ADMIN — Medication 10 ML: at 09:09

## 2023-09-17 RX ADMIN — RIFAXIMIN 550 MG: 550 TABLET ORAL at 21:49

## 2023-09-17 RX ADMIN — Medication 40 MG: at 09:08

## 2023-09-17 RX ADMIN — PIPERACILLIN AND TAZOBACTAM 3375 MG: 3; .375 INJECTION, POWDER, LYOPHILIZED, FOR SOLUTION INTRAVENOUS at 22:14

## 2023-09-17 RX ADMIN — THIAMINE HYDROCHLORIDE 100 MG: 100 INJECTION, SOLUTION INTRAMUSCULAR; INTRAVENOUS at 09:08

## 2023-09-17 RX ADMIN — THERA TABS 1 TABLET: TAB at 09:07

## 2023-09-17 RX ADMIN — INSULIN GLARGINE 5 UNITS: 100 INJECTION, SOLUTION SUBCUTANEOUS at 21:48

## 2023-09-17 RX ADMIN — ALBUMIN (HUMAN) 25 G: 0.25 INJECTION, SOLUTION INTRAVENOUS at 09:44

## 2023-09-17 RX ADMIN — FUROSEMIDE 40 MG: 10 INJECTION, SOLUTION INTRAMUSCULAR; INTRAVENOUS at 09:08

## 2023-09-17 RX ADMIN — MIDODRINE HYDROCHLORIDE 5 MG: 5 TABLET ORAL at 18:15

## 2023-09-17 RX ADMIN — LACTULOSE 20 G: 20 SOLUTION ORAL at 09:07

## 2023-09-17 RX ADMIN — RIFAXIMIN 550 MG: 550 TABLET ORAL at 09:07

## 2023-09-17 RX ADMIN — DIVALPROEX SODIUM 250 MG: 125 CAPSULE ORAL at 14:30

## 2023-09-17 RX ADMIN — SODIUM CHLORIDE, POTASSIUM CHLORIDE, SODIUM LACTATE AND CALCIUM CHLORIDE 1000 ML: 600; 310; 30; 20 INJECTION, SOLUTION INTRAVENOUS at 22:22

## 2023-09-17 NOTE — PROGRESS NOTES
V2.0    [facility] [place] Progress Note      Name:  Annabella Leyden /Age/Sex: 1955  (77 y.o. female)   MRN & CSN:  [id number] & [id number] Encounter Date/Time: 2023 11:54 AM EDT   Location:  [id number]/5275-01 PCP: Aron Ahumada, RN, NP     Brannon Ramírez MD       Hospital Day: 15    Date of Admission: 9/3/2023    Assessment and Recommendations     Interval history:    Annabella Leyden is a 76 y.o. female with pmh of as below who presents with hematemesis. 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 Status post variceal , grade D reflux esophagitis noted, portal hypertensive gastropathy noted, no residual esophageal varices. Patient required blood transfusion intermittently for acute blood loss anemia, hemoglobin stabilized. Her she completed octreotide infusion, IV PPI infusion. Patient hospital course protracted secondary to acute encephalopathy which subsequently improved but patient developed worsening respiratory failure, per the discussion with the pulmonary team, patient elected to have limited code. Patient contemplating hospice    2023  - Pt sleeping at time of my arrival. Easily awakened  - She has no new complaints this morning  - BP continues to run low. Will give 25 grams Albumin and monitor    2023  - Pt sleeping with blanket over head  - She states that she wants to be left alone for now  - She has no new complaints this morning    09/15/2023  - Pneumothorax - Resolved  - Improved mentation. No longer requiring restraints  - NG removed and Pt is tolerating oral intake  - Pulmonary anticipates she will be ready for discharge on   '  2023  - Pt with increased pain yesterday. Morphine increased  - NG tube was pulled out partially by th Pt.   New NG tube placed  - Pt with continued confusion.   - No longer on restraints    2023 Pt with worsening

## 2023-09-17 NOTE — PLAN OF CARE
Problem: Pain  Goal: Verbalizes/displays adequate comfort level or baseline comfort level  Outcome: Progressing     Problem: Skin/Tissue Integrity  Goal: Absence of new skin breakdown  Description: 1. Monitor for areas of redness and/or skin breakdown  2. Assess vascular access sites hourly  3. Every 4-6 hours minimum:  Change oxygen saturation probe site  4. Every 4-6 hours:  If on nasal continuous positive airway pressure, respiratory therapy assess nares and determine need for appliance change or resting period.   Outcome: Progressing     Problem: ABCDS Injury Assessment  Goal: Absence of physical injury  Outcome: Progressing     Problem: Cardiovascular - Adult  Goal: Maintains optimal cardiac output and hemodynamic stability  Outcome: Progressing

## 2023-09-18 LAB
ALBUMIN SERPL-MCNC: 3.1 G/DL (ref 3.4–5)
ANION GAP SERPL CALCULATED.3IONS-SCNC: 4 MMOL/L (ref 3–16)
BUN SERPL-MCNC: 7 MG/DL (ref 7–20)
CALCIUM SERPL-MCNC: 8.7 MG/DL (ref 8.3–10.6)
CHLORIDE SERPL-SCNC: 98 MMOL/L (ref 99–110)
CO2 SERPL-SCNC: 37 MMOL/L (ref 21–32)
CREAT SERPL-MCNC: <0.5 MG/DL (ref 0.6–1.2)
DEPRECATED RDW RBC AUTO: 18.1 % (ref 12.4–15.4)
GFR SERPLBLD CREATININE-BSD FMLA CKD-EPI: >60 ML/MIN/{1.73_M2}
GLUCOSE BLD-MCNC: 144 MG/DL (ref 70–99)
GLUCOSE BLD-MCNC: 197 MG/DL (ref 70–99)
GLUCOSE BLD-MCNC: 212 MG/DL (ref 70–99)
GLUCOSE BLD-MCNC: 228 MG/DL (ref 70–99)
GLUCOSE BLD-MCNC: 56 MG/DL (ref 70–99)
GLUCOSE BLD-MCNC: 82 MG/DL (ref 70–99)
GLUCOSE SERPL-MCNC: 95 MG/DL (ref 70–99)
HCT VFR BLD AUTO: 23.6 % (ref 36–48)
HGB BLD-MCNC: 7.6 G/DL (ref 12–16)
MCH RBC QN AUTO: 28.6 PG (ref 26–34)
MCHC RBC AUTO-ENTMCNC: 32.4 G/DL (ref 31–36)
MCV RBC AUTO: 88.2 FL (ref 80–100)
PERFORMED ON: ABNORMAL
PERFORMED ON: NORMAL
PHOSPHATE SERPL-MCNC: 2.4 MG/DL (ref 2.5–4.9)
PLATELET # BLD AUTO: 61 K/UL (ref 135–450)
PMV BLD AUTO: 9.5 FL (ref 5–10.5)
POTASSIUM SERPL-SCNC: 3.3 MMOL/L (ref 3.5–5.1)
RBC # BLD AUTO: 2.67 M/UL (ref 4–5.2)
SODIUM SERPL-SCNC: 139 MMOL/L (ref 136–145)
WBC # BLD AUTO: 3.6 K/UL (ref 4–11)

## 2023-09-18 PROCEDURE — 6370000000 HC RX 637 (ALT 250 FOR IP): Performed by: INTERNAL MEDICINE

## 2023-09-18 PROCEDURE — 2060000000 HC ICU INTERMEDIATE R&B

## 2023-09-18 PROCEDURE — 85027 COMPLETE CBC AUTOMATED: CPT

## 2023-09-18 PROCEDURE — 6370000000 HC RX 637 (ALT 250 FOR IP): Performed by: STUDENT IN AN ORGANIZED HEALTH CARE EDUCATION/TRAINING PROGRAM

## 2023-09-18 PROCEDURE — 6360000002 HC RX W HCPCS: Performed by: HOSPITALIST

## 2023-09-18 PROCEDURE — 6360000002 HC RX W HCPCS: Performed by: INTERNAL MEDICINE

## 2023-09-18 PROCEDURE — 99232 SBSQ HOSP IP/OBS MODERATE 35: CPT | Performed by: INTERNAL MEDICINE

## 2023-09-18 PROCEDURE — 80069 RENAL FUNCTION PANEL: CPT

## 2023-09-18 PROCEDURE — 94640 AIRWAY INHALATION TREATMENT: CPT

## 2023-09-18 PROCEDURE — 94680 O2 UPTK RST&XERS DIR SIMPLE: CPT

## 2023-09-18 PROCEDURE — 2700000000 HC OXYGEN THERAPY PER DAY

## 2023-09-18 PROCEDURE — 2580000003 HC RX 258: Performed by: INTERNAL MEDICINE

## 2023-09-18 PROCEDURE — C9113 INJ PANTOPRAZOLE SODIUM, VIA: HCPCS | Performed by: INTERNAL MEDICINE

## 2023-09-18 PROCEDURE — 2580000003 HC RX 258: Performed by: HOSPITALIST

## 2023-09-18 PROCEDURE — 2580000003 HC RX 258: Performed by: ANESTHESIOLOGY

## 2023-09-18 PROCEDURE — 94761 N-INVAS EAR/PLS OXIMETRY MLT: CPT

## 2023-09-18 RX ORDER — CARVEDILOL 6.25 MG/1
6.25 TABLET ORAL 2 TIMES DAILY WITH MEALS
Qty: 60 TABLET | Refills: 3 | Status: SHIPPED | OUTPATIENT
Start: 2023-09-18

## 2023-09-18 RX ORDER — GLUCOSAMINE HCL/CHONDROITIN SU 500-400 MG
CAPSULE ORAL
Qty: 100 STRIP | Refills: 3 | Status: SHIPPED | OUTPATIENT
Start: 2023-09-18

## 2023-09-18 RX ORDER — INSULIN LISPRO 100 [IU]/ML
0-8 INJECTION, SOLUTION INTRAVENOUS; SUBCUTANEOUS
Qty: 10 ML | Refills: 3 | Status: SHIPPED | OUTPATIENT
Start: 2023-09-18

## 2023-09-18 RX ORDER — SYRING-NEEDL,DISP,INSUL,0.3 ML 30 GX5/16"
1 SYRINGE, EMPTY DISPOSABLE MISCELLANEOUS SEE ADMIN INSTRUCTIONS
Qty: 100 EACH | Refills: 3 | Status: SHIPPED | OUTPATIENT
Start: 2023-09-18

## 2023-09-18 RX ORDER — PEN NEEDLE, DIABETIC 30 GX5/16"
1 NEEDLE, DISPOSABLE MISCELLANEOUS DAILY
Qty: 100 EACH | Refills: 3 | Status: SHIPPED | OUTPATIENT
Start: 2023-09-18

## 2023-09-18 RX ORDER — INSULIN GLARGINE 100 [IU]/ML
5 INJECTION, SOLUTION SUBCUTANEOUS NIGHTLY
Qty: 5 ADJUSTABLE DOSE PRE-FILLED PEN SYRINGE | Refills: 3 | Status: SHIPPED | OUTPATIENT
Start: 2023-09-18 | End: 2023-09-19 | Stop reason: HOSPADM

## 2023-09-18 RX ORDER — INSULIN LISPRO 100 [IU]/ML
0-4 INJECTION, SOLUTION INTRAVENOUS; SUBCUTANEOUS NIGHTLY
Qty: 10 ML | Refills: 3 | Status: SHIPPED | OUTPATIENT
Start: 2023-09-18

## 2023-09-18 RX ADMIN — DIVALPROEX SODIUM 250 MG: 125 CAPSULE ORAL at 17:25

## 2023-09-18 RX ADMIN — RIFAXIMIN 550 MG: 550 TABLET ORAL at 09:06

## 2023-09-18 RX ADMIN — IPRATROPIUM BROMIDE 0.5 MG: 0.5 SOLUTION RESPIRATORY (INHALATION) at 08:54

## 2023-09-18 RX ADMIN — FUROSEMIDE 40 MG: 10 INJECTION, SOLUTION INTRAMUSCULAR; INTRAVENOUS at 08:57

## 2023-09-18 RX ADMIN — Medication 10 ML: at 21:44

## 2023-09-18 RX ADMIN — FOLIC ACID 1 MG: 1 TABLET ORAL at 09:06

## 2023-09-18 RX ADMIN — Medication 40 MG: at 21:43

## 2023-09-18 RX ADMIN — PIPERACILLIN AND TAZOBACTAM 3375 MG: 3; .375 INJECTION, POWDER, LYOPHILIZED, FOR SOLUTION INTRAVENOUS at 13:33

## 2023-09-18 RX ADMIN — Medication 40 MG: at 08:55

## 2023-09-18 RX ADMIN — RIFAXIMIN 550 MG: 550 TABLET ORAL at 21:43

## 2023-09-18 RX ADMIN — POTASSIUM CHLORIDE 20 MEQ: 29.8 INJECTION, SOLUTION INTRAVENOUS at 09:05

## 2023-09-18 RX ADMIN — Medication 10 ML: at 09:02

## 2023-09-18 RX ADMIN — INSULIN GLARGINE 5 UNITS: 100 INJECTION, SOLUTION SUBCUTANEOUS at 21:44

## 2023-09-18 RX ADMIN — MIDODRINE HYDROCHLORIDE 5 MG: 5 TABLET ORAL at 09:06

## 2023-09-18 RX ADMIN — Medication 16 G: at 03:05

## 2023-09-18 RX ADMIN — THERA TABS 1 TABLET: TAB at 09:06

## 2023-09-18 RX ADMIN — THIAMINE HYDROCHLORIDE 100 MG: 100 INJECTION, SOLUTION INTRAMUSCULAR; INTRAVENOUS at 08:58

## 2023-09-18 RX ADMIN — DIVALPROEX SODIUM 250 MG: 125 CAPSULE ORAL at 05:50

## 2023-09-18 RX ADMIN — DIVALPROEX SODIUM 250 MG: 125 CAPSULE ORAL at 21:43

## 2023-09-18 RX ADMIN — PIPERACILLIN AND TAZOBACTAM 3375 MG: 3; .375 INJECTION, POWDER, LYOPHILIZED, FOR SOLUTION INTRAVENOUS at 06:07

## 2023-09-18 RX ADMIN — FUROSEMIDE 40 MG: 10 INJECTION, SOLUTION INTRAMUSCULAR; INTRAVENOUS at 17:27

## 2023-09-18 RX ADMIN — IPRATROPIUM BROMIDE 0.5 MG: 0.5 SOLUTION RESPIRATORY (INHALATION) at 13:18

## 2023-09-18 RX ADMIN — POTASSIUM CHLORIDE 20 MEQ: 29.8 INJECTION, SOLUTION INTRAVENOUS at 10:58

## 2023-09-18 NOTE — PLAN OF CARE
Achieves optimal ventilation and oxygenation  9/18/2023 1352 by Anna Chavez RN  Outcome: Completed  9/18/2023 0951 by Anna Chavez RN  Outcome: Progressing  9/18/2023 0104 by Benedict Sierra RN  Outcome: Progressing  Flowsheets (Taken 9/17/2023 2130)  Achieves optimal ventilation and oxygenation: Assess for changes in respiratory status     Problem: Cardiovascular - Adult  Goal: Maintains optimal cardiac output and hemodynamic stability  9/18/2023 1352 by Anna Chavez RN  Outcome: Completed  9/18/2023 0951 by Anna Chavez RN  Outcome: Progressing  9/18/2023 0104 by Benedict Sierra RN  Outcome: Progressing  Flowsheets (Taken 9/17/2023 2130)  Maintains optimal cardiac output and hemodynamic stability: Monitor blood pressure and heart rate     Problem: Genitourinary - Adult  Goal: Urinary catheter remains patent  9/18/2023 1352 by Anna Chavez RN  Outcome: Completed  9/18/2023 0951 by Anna Chavez RN  Outcome: Progressing  9/18/2023 0104 by Benedict Sierra RN  Outcome: Progressing  Flowsheets (Taken 9/17/2023 2130)  Urinary catheter remains patent: Assess patency of urinary catheter     Problem: Metabolic/Fluid and Electrolytes - Adult  Goal: Electrolytes maintained within normal limits  9/18/2023 1352 by Anna Chavez RN  Outcome: Completed  9/18/2023 0951 by Anna Chavez RN  Outcome: Progressing  9/18/2023 0104 by Benedict Sierra RN  Outcome: Progressing  Flowsheets (Taken 9/17/2023 2130)  Electrolytes maintained within normal limits: Monitor labs and assess patient for signs and symptoms of electrolyte imbalances     Problem: Hematologic - Adult  Goal: Maintains hematologic stability  9/18/2023 1352 by Anna Chavez RN  Outcome: Completed  9/18/2023 0951 by Anna Chavez RN  Outcome: Progressing  9/18/2023 0104 by Benedict Sierra RN  Outcome: Progressing  Flowsheets (Taken 9/17/2023 2130)  Maintains hematologic stability: Assess for signs and symptoms of bleeding or hemorrhage     Problem: Skin/Tissue Integrity - Adult  Goal: Skin integrity remains intact  9/18/2023 1352 by Ismael Tong RN  Outcome: Completed  Flowsheets (Taken 9/18/2023 8534)  Skin Integrity Remains Intact: Monitor for areas of redness and/or skin breakdown  9/18/2023 0951 by Ismael Tong RN  Outcome: Progressing  Flowsheets (Taken 9/18/2023 0106 by Giovani Carver RN)  Skin Integrity Remains Intact: Monitor for areas of redness and/or skin breakdown  9/18/2023 0104 by Giovani Carver RN  Outcome: Progressing  Flowsheets (Taken 9/17/2023 2130)  Skin Integrity Remains Intact: Monitor for areas of redness and/or skin breakdown     Problem: Discharge Planning  Goal: Discharge to home or other facility with appropriate resources  9/18/2023 1352 by Ismael Tong RN  Outcome: Completed  9/18/2023 0951 by Ismael Tong RN  Outcome: Progressing  9/18/2023 0104 by Giovani Carver RN  Outcome: Progressing  Flowsheets (Taken 9/17/2023 2130)  Discharge to home or other facility with appropriate resources: Identify barriers to discharge with patient and caregiver     Problem: Nutrition Deficit:  Goal: Optimize nutritional status  9/18/2023 1352 by Ismael Tong RN  Outcome: Completed  9/18/2023 0951 by Ismael Tong RN  Outcome: Progressing  9/18/2023 0104 by Giovani Carver RN  Outcome: Progressing     Problem: Musculoskeletal - Adult  Goal: Return mobility to safest level of function  9/18/2023 1352 by Ismael Tong RN  Outcome: Completed  9/18/2023 0951 by Ismael Tong RN  Outcome: Progressing  9/18/2023 0104 by Giovani Carver RN  Outcome: Progressing  Flowsheets (Taken 9/17/2023 2130)  Return Mobility to Safest Level of Function:   Assess patient stability and activity tolerance for standing, transferring and ambulating with or without assistive devices   Assist with transfers and ambulation using safe patient handling equipment as needed  Goal: Return ADL status to a safe level of function  9/18/2023 1352 by Ismael Tong RN  Outcome: Completed  9/18/2023 0951 by

## 2023-09-18 NOTE — CARE COORDINATION
[facility]    Referral received from  to follow for home care services. I will follow for needs, and speak with patient to verify demos.     Eleno Martinez RN, BSN CTN  [facility] [phone]

## 2023-09-18 NOTE — PROGRESS NOTES
2142 patients BP manually 78/52. Dr. Rodrigue Ulrich notified. LR bolus ordered. 2228 Pt Bp after LR bolus is 116/74.

## 2023-09-18 NOTE — PROGRESS NOTES
Pt refusing vitals at this time. Stated \"I'm going home, leave me alone\". Electronically signed by James Robles RN on 9/18/23 at 8:20 AM EDT    Pt rep removing O2- sats 74%. O2 reapplied. Refusing cont pulse ox. Electronically signed by James Robles RN on 9/18/23 at 8:55 AM EDT    RN received call from pt's son with concerns regarding pt's discharge. Son feels pt is very confused still and is concerned about pt being able to care for herself. With son on phone Rn entered pt's room and reassessed mental status. Pt was able to stated the correct year and which hospital she is at but was inaccurate regarding the date. When asked why she was here pt stated \"because I was found laying in my driveway\"- this is incorrect. RN questioned pt if she would be able to check her blood sugar and administer shots, pt responded \"[facility]\". When asked if she would be able to ambulate around apt being that she has refused therapy and has only gotten up to the bedside commode pt responded \"well I have a shower chair\". RN asked pt how she planned on getting home, pt responded \"well my brother has appts\". RN asked if the appts were here at [facility] and pt rolled her eyes and responded \"no\". RN inquired how that related to pt getting home. Pt once again responded \"well I have a shower chair\". Son very concerned with this. Requested to appeal discharge and also requested a different MD be assigned to his mother as he said he has had issues with current MD responding to other issues regarding his mother this stay.    RN LVM for case management regarding request to appeal. Message sent to Dr. Eulalia Clark regarding family's request for new MD.  Electronically signed by James Robles RN on 9/18/23 at 6:05 PM EDT

## 2023-09-18 NOTE — PROGRESS NOTES
V2.0    [facility] Progress Note      Name:  Poppy Tesfaye /Age/Sex: 1955  (77 y.o. female)   MRN & CSN:  [id number] & [id number] Encounter Date/Time: 2023 11:54 AM EDT   Location:  [id number]/5275-01 PCP: Patrice Roy RN, NP     Sherwin Perez MD       Hospital Day: 16    Date of Admission: 9/3/2023    Assessment and Recommendations     Interval history:    Poppy Tesfaye is a 76 y.o. female with pmh of as below who presents with hematemesis. Patient evaluated by gastroenterology. S/p emergent EGD 9/3 which revealed gastric cardia lesion (dieulafoy lesion versus atypical varix . Status post variceal , grade D reflux esophagitis noted, portal hypertensive gastropathy noted, no residual esophageal varices. Patient required blood transfusion intermittently for acute blood loss anemia, hemoglobin stabilized. Her she completed octreotide infusion, IV PPI infusion. Patient hospital course protracted secondary to acute encephalopathy which subsequently improved but patient developed worsening respiratory failure, per the discussion with the pulmonary team, patient elected to have limited code. Patient contemplating hospice    2023  - She has no new complaints this morning  - Medically ready for discharge. She states that she is refusing placement and plans to go home. - Encouraged her to go to rehab. She is adamant about going home  - Will check home oxygen evaluation    2023  - Pt sleeping at time of my arrival. Easily awakened  - She has no new complaints this morning  - BP continues to run low. Will give 25 grams Albumin and monitor    2023  - Pt sleeping with blanket over head  - She states that she wants to be left alone for now  - She has no new complaints this morning    09/15/2023  - Pneumothorax - Resolved  - Improved mentation.  No longer requiring restraints  - NG removed and Pt is tolerating oral intake  - Pulmonary anticipates she will be ready for discharge Patent abdominal vasculature. Bones/Soft Tissues: Interval progression of compression deformity at L2, now with moderate height loss. A moderate to severe compression deformity is present at T11, new from prior exam.  Compression deformities at T12, L1, L3, and L4 are unchanged. 1. No evidence of acute arterial extravasation within the bowel. 2. Diffusely edematous appearance of the walls of the small bowel as well of the colon, which could be related to third spacing or hypoperfusion/shock. The mesenteric arteries are patent. 3. Distended stomach. 4. Mild diverticulosis distal colon without definite evidence of acute diverticulitis. 5. Cirrhotic liver with mild splenomegaly and a small amount of ascites. 6. Small to moderate sized hiatal hernia. 7. Chronic pancreatitis. 8. Moderate to severe compression deformity at T11, new from prior exam. Mild interval progression of a compression deformity at L2. EGD    Result Date: 9/3/2023  No dictation       CBC:   Recent Labs     09/16/23  0600 09/17/23  0545 09/18/23  0600   WBC 4.6 4.3 3.6*   HGB 7.3* 8.3* 7.6*   PLT 65* 71* 61*       BMP:    Recent Labs     09/16/23  0600 09/17/23  0545 09/18/23  0600    138 139   K 3.2* 3.6 3.3*   CL 92* 96* 98*   CO2 45* 39* 37*   BUN 12 11 7   CREATININE 0.7 0.6 <0.5*   GLUCOSE 77 74 95       Hepatic:   No results for input(s): \"AST\", \"ALT\", \"ALB\", \"BILITOT\", \"ALKPHOS\" in the last 72 hours. Lipids:   Lab Results   Component Value Date/Time    CHOL 54 07/02/2023 05:19 AM    HDL 25 07/02/2023 05:19 AM    TRIG 53 07/02/2023 05:19 AM     Hemoglobin A1C:   Lab Results   Component Value Date/Time    LABA1C 5.8 09/15/2023 05:10 AM     TSH:   Lab Results   Component Value Date/Time    TSH 0.01 09/06/2023 04:20 AM     Troponin: No results found for: \"TROPONINT\"  Lactic Acid: No results for input(s): \"LACTA\" in the last 72 hours. BNP: No results for input(s): \"PROBNP\" in the last 72 hours.   UA:  Lab Results   Component Value

## 2023-09-18 NOTE — PROGRESS NOTES
Occupational Therapy Attempt  Debbie Citizen  9/18/2023  [id number]    Pt chart reviewed, OK to see per RN. Pt attempted, and pt declined, reporting she was too tired to participate in therapy. Pt was encouraged to work with OT, but then responded w/ \"I shouldn't have to do something I don't want to do\". Pt in bed, call light within reach, and bed alarm is activated. Pt has d/c order in. Pt has refused OT eval 3x so far. Please see previously written PT note for functional mobility status.      Electronically signed by ALLISON Medina/L [id number] on 9/18/23 at 11:44 AM EDT

## 2023-09-18 NOTE — PLAN OF CARE
Problem: Pain  Goal: Verbalizes/displays adequate comfort level or baseline comfort level  Outcome: Progressing  Flowsheets  Taken 9/17/2023 2329  Verbalizes/displays adequate comfort level or baseline comfort level:   Encourage patient to monitor pain and request assistance   Implement non-pharmacological measures as appropriate and evaluate response   Assess pain using appropriate pain scale  Taken 9/17/2023 2130  Verbalizes/displays adequate comfort level or baseline comfort level:   Encourage patient to monitor pain and request assistance   Assess pain using appropriate pain scale   Administer analgesics based on type and severity of pain and evaluate response   Implement non-pharmacological measures as appropriate and evaluate response     Problem: Safety - Adult  Goal: Free from fall injury  Outcome: Progressing     Problem: Skin/Tissue Integrity  Goal: Absence of new skin breakdown  Description: 1. Monitor for areas of redness and/or skin breakdown  2. Assess vascular access sites hourly  3. Every 4-6 hours minimum:  Change oxygen saturation probe site  4. Every 4-6 hours:  If on nasal continuous positive airway pressure, respiratory therapy assess nares and determine need for appliance change or resting period. Outcome: Progressing     Problem: ABCDS Injury Assessment  Goal: Absence of physical injury  Outcome: Progressing     Problem: Confusion  Goal: Confusion, delirium, dementia, or psychosis is improved or at baseline  Description: INTERVENTIONS:  1. Assess for possible contributors to thought disturbance, including medications, impaired vision or hearing, underlying metabolic abnormalities, dehydration, psychiatric diagnoses, and notify attending LIP  2. Jackson high risk fall precautions, as indicated  3. Provide frequent short contacts to provide reality reorientation, refocusing and direction  4. Decrease environmental stimuli, including noise as appropriate  5.  Monitor and intervene to maintain adequate nutrition, hydration, elimination, sleep and activity  6. If unable to ensure safety without constant attention obtain sitter and review sitter guidelines with assigned personnel  7.  Initiate Psychosocial CNS and Spiritual Care consult, as indicated  Outcome: Progressing     Problem: Neurosensory - Adult  Goal: Achieves stable or improved neurological status  Outcome: Progressing     Problem: Respiratory - Adult  Goal: Achieves optimal ventilation and oxygenation  Outcome: Progressing     Problem: Cardiovascular - Adult  Goal: Maintains optimal cardiac output and hemodynamic stability  Outcome: Progressing     Problem: Genitourinary - Adult  Goal: Urinary catheter remains patent  Outcome: Progressing     Problem: Metabolic/Fluid and Electrolytes - Adult  Goal: Electrolytes maintained within normal limits  Outcome: Progressing     Problem: Hematologic - Adult  Goal: Maintains hematologic stability  Outcome: Progressing     Problem: Skin/Tissue Integrity - Adult  Goal: Skin integrity remains intact  Outcome: Progressing     Problem: Discharge Planning  Goal: Discharge to home or other facility with appropriate resources  Outcome: Progressing     Problem: Nutrition Deficit:  Goal: Optimize nutritional status  Outcome: Progressing     Problem: Musculoskeletal - Adult  Goal: Return mobility to safest level of function  Outcome: Progressing  Goal: Return ADL status to a safe level of function  Outcome: Progressing     Problem: Gastrointestinal - Adult  Goal: Maintains or returns to baseline bowel function  Outcome: Progressing  Goal: Maintains adequate nutritional intake  Outcome: Progressing

## 2023-09-18 NOTE — CARE COORDINATION
VIRGINIE checked referrals. [facility] [id number] [place] continue to review. VIRGINIE left message for son to return call. Pt does not want to go to rehab.        Yvon Mckeon LMSW, [address] [facility] Social Work Case Management   Phone: [phone]  Fax: [phone]

## 2023-09-18 NOTE — PLAN OF CARE
Problem: Pain  Goal: Verbalizes/displays adequate comfort level or baseline comfort level  9/18/2023 0951 by Maliha Harper RN  Outcome: Progressing  9/18/2023 0104 by Aurora Wilder RN  Outcome: Progressing  Flowsheets  Taken 9/17/2023 2329  Verbalizes/displays adequate comfort level or baseline comfort level:   Encourage patient to monitor pain and request assistance   Implement non-pharmacological measures as appropriate and evaluate response   Assess pain using appropriate pain scale  Taken 9/17/2023 2130  Verbalizes/displays adequate comfort level or baseline comfort level:   Encourage patient to monitor pain and request assistance   Assess pain using appropriate pain scale   Administer analgesics based on type and severity of pain and evaluate response   Implement non-pharmacological measures as appropriate and evaluate response     Problem: Safety - Adult  Goal: Free from fall injury  9/18/2023 0951 by Maliha Harper RN  Outcome: Progressing  Note: Fall precautions in place. Bed locked and in lowest position. Alarm on. Telecam in place. Call light within reach. 9/18/2023 0104 by Aurora Wilder RN  Outcome: Progressing     Problem: Skin/Tissue Integrity  Goal: Absence of new skin breakdown  Description: 1. Monitor for areas of redness and/or skin breakdown  2. Assess vascular access sites hourly  3. Every 4-6 hours minimum:  Change oxygen saturation probe site  4. Every 4-6 hours:  If on nasal continuous positive airway pressure, respiratory therapy assess nares and determine need for appliance change or resting period.   9/18/2023 0951 by Maliha Harper RN  Outcome: Progressing  9/18/2023 0104 by Aurora Wilder RN  Outcome: Progressing     Problem: ABCDS Injury Assessment  Goal: Absence of physical injury  9/18/2023 0951 by Maliha Harper RN  Outcome: Progressing  9/18/2023 0104 by Aurora Wilder RN  Outcome: Progressing     Problem: Confusion  Goal: Confusion, delirium, dementia, or psychosis is improved RN  Outcome: Progressing  Flowsheets (Taken 9/17/2023 2130)  Discharge to home or other facility with appropriate resources: Identify barriers to discharge with patient and caregiver     Problem: Nutrition Deficit:  Goal: Optimize nutritional status  9/18/2023 0951 by Katie Peralta RN  Outcome: Progressing  9/18/2023 0104 by Katy Love RN  Outcome: Progressing     Problem: Musculoskeletal - Adult  Goal: Return mobility to safest level of function  9/18/2023 0951 by Katie Peralta RN  Outcome: Progressing  9/18/2023 0104 by Katy Love RN  Outcome: Progressing  Flowsheets (Taken 9/17/2023 2130)  Return Mobility to Safest Level of Function:   Assess patient stability and activity tolerance for standing, transferring and ambulating with or without assistive devices   Assist with transfers and ambulation using safe patient handling equipment as needed  Goal: Return ADL status to a safe level of function  9/18/2023 0951 by Katie Peralta RN  Outcome: Progressing  9/18/2023 0104 by Katy Love RN  Outcome: Progressing  Flowsheets (Taken 9/17/2023 2130)  Return ADL Status to a Safe Level of Function: Administer medication as ordered     Problem: Gastrointestinal - Adult  Goal: Maintains or returns to baseline bowel function  9/18/2023 0951 by Katie Peralta RN  Outcome: Progressing  9/18/2023 0104 by Katy Love RN  Outcome: Progressing  Flowsheets (Taken 9/17/2023 2130)  Maintains or returns to baseline bowel function: Assess bowel function  Goal: Maintains adequate nutritional intake  9/18/2023 0951 by Katie Peralta RN  Outcome: Progressing  9/18/2023 0104 by Katy Love RN  Outcome: Progressing  Flowsheets (Taken 9/17/2023 2130)  Maintains adequate nutritional intake: Monitor intake and output, weight and lab values

## 2023-09-18 NOTE — PROGRESS NOTES
Patients 0200 BS level was 56. Chewable glucose tablets given per order. Patients BS level went to 144. Patient is currently resting with eyes closed, respirations even and unlabored. No distress noted on exam. Plan of care ongoing.

## 2023-09-18 NOTE — PROGRESS NOTES
Patient is routinely taking off nasal cannula. Earlier today, her SpO2 on room air was 74% on room air.

## 2023-09-18 NOTE — CARE COORDINATION
Virginie spoke with son Janie Lorenzana, to inform of dc order, and that pt is refusing therapies, stating that she is going home. He stated that he will see what he can do, and VIRGINIE did inform that she did [facility], [facility] for her, and that will be in her dc paperwork. Perfect serve messaged to doctor Amanda Contreras, and he stated that pt was alert and oriented x4, and able to make her own decision. Even though it is against the recommendation for SNF. Son is informed of this, and that patient adamantly refuses, son states that he will talk with his mother (the patient).      Stefanie Goodpasture, [place], [address] [facility] Social Work Case Management   Phone: [phone]  Fax: [phone]

## 2023-09-18 NOTE — CARE COORDINATION
SW met with pt to discuss the SNFs, after OT/PT saw pt. Pt is refusing OT/PT. Sw informed pt that she believes her son Jarrell Meier would like her to go to rehab, before going home to [facility]. Pt adamant about going home. VIRGINIE sent referral to [facility], to review in case, and called son Jarrell Meier to inform him that his mother wants to go home.      Marley Fink LMSW, [facility]   Phone: [phone]  Fax: [phone]

## 2023-09-19 ENCOUNTER — APPOINTMENT (OUTPATIENT)
Dept: GENERAL RADIOLOGY | Age: 68
End: 2023-09-19
Payer: MEDICARE

## 2023-09-19 LAB
ALBUMIN SERPL-MCNC: 3.2 G/DL (ref 3.4–5)
ALBUMIN SERPL-MCNC: 3.5 G/DL (ref 3.4–5)
ALP SERPL-CCNC: 97 U/L (ref 40–129)
ALT SERPL-CCNC: 19 U/L (ref 10–40)
AMMONIA PLAS-SCNC: 57 UMOL/L (ref 11–51)
ANION GAP SERPL CALCULATED.3IONS-SCNC: 5 MMOL/L (ref 3–16)
AST SERPL-CCNC: 26 U/L (ref 15–37)
BACTERIA URNS QL MICRO: NORMAL /HPF
BILIRUB DIRECT SERPL-MCNC: 0.6 MG/DL (ref 0–0.3)
BILIRUB INDIRECT SERPL-MCNC: 1.1 MG/DL (ref 0–1)
BILIRUB SERPL-MCNC: 1.7 MG/DL (ref 0–1)
BILIRUB UR QL STRIP.AUTO: NEGATIVE
BUN SERPL-MCNC: 6 MG/DL (ref 7–20)
CALCIUM SERPL-MCNC: 8.3 MG/DL (ref 8.3–10.6)
CHLORIDE SERPL-SCNC: 98 MMOL/L (ref 99–110)
CLARITY UR: CLEAR
CO2 SERPL-SCNC: 38 MMOL/L (ref 21–32)
COLOR UR: ABNORMAL
CREAT SERPL-MCNC: <0.5 MG/DL (ref 0.6–1.2)
DEPRECATED RDW RBC AUTO: 18.1 % (ref 12.4–15.4)
EPI CELLS #/AREA URNS AUTO: 2 /HPF (ref 0–5)
FERRITIN SERPL IA-MCNC: 76.1 NG/ML (ref 15–150)
GFR SERPLBLD CREATININE-BSD FMLA CKD-EPI: >60 ML/MIN/{1.73_M2}
GLUCOSE BLD-MCNC: 169 MG/DL (ref 70–99)
GLUCOSE BLD-MCNC: 171 MG/DL (ref 70–99)
GLUCOSE BLD-MCNC: 263 MG/DL (ref 70–99)
GLUCOSE BLD-MCNC: 68 MG/DL (ref 70–99)
GLUCOSE BLD-MCNC: 76 MG/DL (ref 70–99)
GLUCOSE BLD-MCNC: 77 MG/DL (ref 70–99)
GLUCOSE SERPL-MCNC: 84 MG/DL (ref 70–99)
GLUCOSE UR STRIP.AUTO-MCNC: NEGATIVE MG/DL
HCT VFR BLD AUTO: 23.7 % (ref 36–48)
HGB BLD-MCNC: 7.9 G/DL (ref 12–16)
HGB UR QL STRIP.AUTO: NEGATIVE
HYALINE CASTS #/AREA URNS AUTO: 5 /LPF (ref 0–8)
IRON SATN MFR SERPL: 17 % (ref 15–50)
IRON SERPL-MCNC: 43 UG/DL (ref 37–145)
KETONES UR STRIP.AUTO-MCNC: NEGATIVE MG/DL
LEUKOCYTE ESTERASE UR QL STRIP.AUTO: ABNORMAL
MCH RBC QN AUTO: 29 PG (ref 26–34)
MCHC RBC AUTO-ENTMCNC: 33.1 G/DL (ref 31–36)
MCV RBC AUTO: 87.4 FL (ref 80–100)
NITRITE UR QL STRIP.AUTO: NEGATIVE
PERFORMED ON: ABNORMAL
PERFORMED ON: NORMAL
PERFORMED ON: NORMAL
PH UR STRIP.AUTO: 8 [PH] (ref 5–8)
PHOSPHATE SERPL-MCNC: 2.2 MG/DL (ref 2.5–4.9)
PLATELET # BLD AUTO: 63 K/UL (ref 135–450)
PMV BLD AUTO: 9.1 FL (ref 5–10.5)
POTASSIUM SERPL-SCNC: 3.5 MMOL/L (ref 3.5–5.1)
PROT SERPL-MCNC: 5.6 G/DL (ref 6.4–8.2)
PROT UR STRIP.AUTO-MCNC: NEGATIVE MG/DL
RBC # BLD AUTO: 2.72 M/UL (ref 4–5.2)
RBC CLUMPS #/AREA URNS AUTO: 3 /HPF (ref 0–4)
SODIUM SERPL-SCNC: 141 MMOL/L (ref 136–145)
SP GR UR STRIP.AUTO: 1.01 (ref 1–1.03)
TIBC SERPL-MCNC: 256 UG/DL (ref 260–445)
UA DIPSTICK W REFLEX MICRO PNL UR: YES
URN SPEC COLLECT METH UR: ABNORMAL
UROBILINOGEN UR STRIP-ACNC: 0.2 E.U./DL
WBC # BLD AUTO: 3.2 K/UL (ref 4–11)
WBC #/AREA URNS AUTO: 3 /HPF (ref 0–5)

## 2023-09-19 PROCEDURE — 6370000000 HC RX 637 (ALT 250 FOR IP): Performed by: INTERNAL MEDICINE

## 2023-09-19 PROCEDURE — 97166 OT EVAL MOD COMPLEX 45 MIN: CPT

## 2023-09-19 PROCEDURE — 87086 URINE CULTURE/COLONY COUNT: CPT

## 2023-09-19 PROCEDURE — 6360000002 HC RX W HCPCS: Performed by: HOSPITALIST

## 2023-09-19 PROCEDURE — 83550 IRON BINDING TEST: CPT

## 2023-09-19 PROCEDURE — 82728 ASSAY OF FERRITIN: CPT

## 2023-09-19 PROCEDURE — 97530 THERAPEUTIC ACTIVITIES: CPT

## 2023-09-19 PROCEDURE — 97116 GAIT TRAINING THERAPY: CPT

## 2023-09-19 PROCEDURE — 6370000000 HC RX 637 (ALT 250 FOR IP): Performed by: HOSPITALIST

## 2023-09-19 PROCEDURE — 94640 AIRWAY INHALATION TREATMENT: CPT

## 2023-09-19 PROCEDURE — 2580000003 HC RX 258: Performed by: INTERNAL MEDICINE

## 2023-09-19 PROCEDURE — 83540 ASSAY OF IRON: CPT

## 2023-09-19 PROCEDURE — 6360000002 HC RX W HCPCS: Performed by: INTERNAL MEDICINE

## 2023-09-19 PROCEDURE — 2580000003 HC RX 258: Performed by: REGISTERED NURSE

## 2023-09-19 PROCEDURE — C9113 INJ PANTOPRAZOLE SODIUM, VIA: HCPCS | Performed by: INTERNAL MEDICINE

## 2023-09-19 PROCEDURE — 82140 ASSAY OF AMMONIA: CPT

## 2023-09-19 PROCEDURE — 80069 RENAL FUNCTION PANEL: CPT

## 2023-09-19 PROCEDURE — 80076 HEPATIC FUNCTION PANEL: CPT

## 2023-09-19 PROCEDURE — 85027 COMPLETE CBC AUTOMATED: CPT

## 2023-09-19 PROCEDURE — 2700000000 HC OXYGEN THERAPY PER DAY

## 2023-09-19 PROCEDURE — 2580000003 HC RX 258: Performed by: ANESTHESIOLOGY

## 2023-09-19 PROCEDURE — 71046 X-RAY EXAM CHEST 2 VIEWS: CPT

## 2023-09-19 PROCEDURE — 81001 URINALYSIS AUTO W/SCOPE: CPT

## 2023-09-19 PROCEDURE — 97535 SELF CARE MNGMENT TRAINING: CPT

## 2023-09-19 PROCEDURE — 94761 N-INVAS EAR/PLS OXIMETRY MLT: CPT

## 2023-09-19 PROCEDURE — 2060000000 HC ICU INTERMEDIATE R&B

## 2023-09-19 RX ORDER — INSULIN LISPRO 100 [IU]/ML
0-4 INJECTION, SOLUTION INTRAVENOUS; SUBCUTANEOUS
Status: DISCONTINUED | OUTPATIENT
Start: 2023-09-19 | End: 2023-09-21 | Stop reason: HOSPADM

## 2023-09-19 RX ORDER — INSULIN GLARGINE 100 [IU]/ML
4 INJECTION, SOLUTION SUBCUTANEOUS NIGHTLY
Status: DISCONTINUED | OUTPATIENT
Start: 2023-09-19 | End: 2023-09-19

## 2023-09-19 RX ORDER — INSULIN LISPRO 100 [IU]/ML
0-4 INJECTION, SOLUTION INTRAVENOUS; SUBCUTANEOUS NIGHTLY
Status: DISCONTINUED | OUTPATIENT
Start: 2023-09-19 | End: 2023-09-21 | Stop reason: HOSPADM

## 2023-09-19 RX ORDER — 0.9 % SODIUM CHLORIDE 0.9 %
500 INTRAVENOUS SOLUTION INTRAVENOUS ONCE
Status: COMPLETED | OUTPATIENT
Start: 2023-09-19 | End: 2023-09-20

## 2023-09-19 RX ADMIN — THERA TABS 1 TABLET: TAB at 08:46

## 2023-09-19 RX ADMIN — MIDODRINE HYDROCHLORIDE 5 MG: 5 TABLET ORAL at 16:24

## 2023-09-19 RX ADMIN — DIVALPROEX SODIUM 250 MG: 125 CAPSULE ORAL at 06:13

## 2023-09-19 RX ADMIN — LACTULOSE 20 G: 20 SOLUTION ORAL at 21:15

## 2023-09-19 RX ADMIN — METFORMIN HYDROCHLORIDE 500 MG: 500 TABLET, FILM COATED ORAL at 11:49

## 2023-09-19 RX ADMIN — IPRATROPIUM BROMIDE 0.5 MG: 0.5 SOLUTION RESPIRATORY (INHALATION) at 20:19

## 2023-09-19 RX ADMIN — FOLIC ACID 1 MG: 1 TABLET ORAL at 08:46

## 2023-09-19 RX ADMIN — Medication 10 ML: at 21:15

## 2023-09-19 RX ADMIN — RIFAXIMIN 550 MG: 550 TABLET ORAL at 21:15

## 2023-09-19 RX ADMIN — DIVALPROEX SODIUM 250 MG: 125 CAPSULE ORAL at 15:10

## 2023-09-19 RX ADMIN — Medication 40 MG: at 21:15

## 2023-09-19 RX ADMIN — DIVALPROEX SODIUM 250 MG: 125 CAPSULE ORAL at 21:14

## 2023-09-19 RX ADMIN — FUROSEMIDE 40 MG: 10 INJECTION, SOLUTION INTRAMUSCULAR; INTRAVENOUS at 17:24

## 2023-09-19 RX ADMIN — Medication 40 MG: at 08:48

## 2023-09-19 RX ADMIN — RIFAXIMIN 550 MG: 550 TABLET ORAL at 08:46

## 2023-09-19 RX ADMIN — SODIUM CHLORIDE 500 ML: 9 INJECTION, SOLUTION INTRAVENOUS at 21:59

## 2023-09-19 RX ADMIN — INSULIN LISPRO 2 UNITS: 100 INJECTION, SOLUTION INTRAVENOUS; SUBCUTANEOUS at 17:24

## 2023-09-19 RX ADMIN — LACTULOSE 20 G: 20 SOLUTION ORAL at 15:10

## 2023-09-19 RX ADMIN — MIDODRINE HYDROCHLORIDE 5 MG: 5 TABLET ORAL at 08:47

## 2023-09-19 RX ADMIN — THIAMINE HYDROCHLORIDE 100 MG: 100 INJECTION, SOLUTION INTRAMUSCULAR; INTRAVENOUS at 08:46

## 2023-09-19 RX ADMIN — FUROSEMIDE 40 MG: 10 INJECTION, SOLUTION INTRAMUSCULAR; INTRAVENOUS at 08:48

## 2023-09-19 ASSESSMENT — PAIN SCALES - GENERAL: PAINLEVEL_OUTOF10: 0

## 2023-09-19 NOTE — PROGRESS NOTES
Comprehensive Nutrition Assessment    Type and Reason for Visit:  Reassess    Nutrition Recommendations/Plan:   Diet as tolerated, currently on easy to chew  Continue Ensure compact  Offer Magic cups we well bid since meal intake less than 25% at times     Malnutrition Assessment:  Malnutrition Status:  Severe malnutrition (09/08/23 0901)    Context:  Chronic Illness     Findings of the 6 clinical characteristics of malnutrition:  Energy Intake:  Unable to assess  Weight Loss:  Unable to assess (fluid shifts)     Body Fat Loss:  Severe body fat loss Orbital, Triceps   Muscle Mass Loss:  Severe muscle mass loss Temples (temporalis), Clavicles (pectoralis & deltoids), Scapula (trapezius), Hand (interosseous)  Fluid Accumulation:  Mild Extremities   Strength:  Not Performed    Nutrition Assessment:    Follow up:  Continues on an easy to chew diet. Also receiving Ensure compact tid. Meal intake appears around 25% and supplement intake %. Weight with a significant decline in the past 24 hours, usually in the mid 90's. Nutrition Related Findings:    Phos 2.2 on 9/19; last BM on 9/19; BG less than 80 mg/dl Wound Type: None       Current Nutrition Intake & Therapies:    Average Meal Intake: 26-50%  Average Supplements Intake: 51-75%, %  ADULT DIET; Easy to Chew  ADULT ORAL NUTRITION SUPPLEMENT; Breakfast, Lunch, Dinner; Standard 4 oz Oral Supplement    Anthropometric Measures:  Height: 5' 2\" (157.5 cm)  Ideal Body Weight (IBW): 110 lbs (50 kg)    Admission Body Weight: 86 lb (39 kg)  Current Body Weight: 81 lb 12.7 oz (37.1 kg),   IBW.  Weight Source: Bed Scale  Current BMI (kg/m2): 15  Usual Body Weight:  (per records wt generally high 70s to 80s.)                       BMI Categories: Underweight (BMI less than 22) age over 72    Estimated Daily Nutrient Needs:        Energy (kcal/day): 5389-6813 (35-40 x ABW 48 kg)     Protein (g/day): 38-72 (.8-1.5 x ABW 48 kg (adj for hepatic disease)     Fluid While pt was in CT , pt c/o of cp , olga. Pt brought back to room by CT tech. EKG performed. Md at bedside assessed. Pt reports pain has resolved.

## 2023-09-19 NOTE — PROGRESS NOTES
No events overnight. Pt able to answer all orientation questions, however still had episodes of intermittent confusion throughout the night. Pt redirectable and able to follow commands.        Electronically signed by Hal Gonzales RN on 9/19/2023 at 7:27 AM

## 2023-09-19 NOTE — PROGRESS NOTES
varices. Patient required blood transfusion intermittently for acute blood loss anemia, hemoglobin stabilized. Her she completed octreotide infusion, IV PPI infusion. Patient hospital course protracted secondary to acute encephalopathy which subsequently improved but patient developed worsening respiratory failure, per the discussion with the pulmonary team, patient elected to have limited code. Patient contemplating hospice\"  Family / Caregiver Present: No  Referring Practitioner: Lynn Pablo MD  Subjective  Subjective: Pt agreeable to OT evaluation. Pt with no reports of pain. Social/Functional History  Social/Functional History  Lives With: Alone  Type of Home: Apartment  Home Layout: One level  Home Access: Level entry, Elevator  Bathroom Equipment: Shower chair  Home Equipment: Toy Conch, rolling, Rollator  ADL Assistance: Independent  Ambulation Assistance: Independent (Rollator)  Transfer Assistance: Independent  Active : No  Additional Comments: Brand-new apartment;       Objective        Safety Devices  Type of Devices: All fall risk precautions in place;Call light within reach;Nurse notified; Bed alarm in place;Gait belt;Left in bed  Restraints  Restraints Initially in Place: No  Bed Mobility Training  Bed Mobility Training: Yes  Overall Level of Assistance: Minimum assistance  Supine to Sit: Minimum assistance  Scooting: Minimum assistance  Balance  Sitting: Intact  Standing: Impaired (RW)  Standing - Static: Good;Fair  Standing - Dynamic: Fair  Transfer Training  Transfer Training: Yes  Overall Level of Assistance: Contact-guard assistance (RW)  Sit to Stand: Contact-guard assistance (RW)  Stand to Sit: Contact-guard assistance  Bed to Chair: Contact-guard assistance (RW)  Toilet Transfer: Contact-guard assistance (RW; grab bar)  Gait  Overall Level of Assistance: Contact-guard assistance (RW; no overt LOB; cues for RW safety)  Distance (ft): 25 Feet (25ft x2)  Assistive 2: complete bathing and dressing with supervision  Short Term Goal 3: complete toileting with supervision  Short Term Goal 4: complete grooming in stance at sink with supervision  Long Term Goals  Time Frame for Long Term Goals : STG=LTG  Patient Goals   Patient goals : return home       Therapy Time   Individual Concurrent Group Co-treatment   Time In 0730         Time Out 0810         Minutes 40         Timed Code Treatment Minutes: 25 Minutes (15 minute eval)       ALLISON Greco/MARLEE

## 2023-09-19 NOTE — PROGRESS NOTES
assistance  Stand to Sit: Contact guard assistance  Ambulation  Surface: Level tile  Device: Rolling Walker  Assistance: Contact guard assistance  Gait Deviations: Slow Tricia;Decreased step length;Decreased step height;Staggers  Distance: 48' with turns  Stairs/Curb  Stairs?: No     Balance  Posture: Fair  Sitting - Static: Good  Sitting - Dynamic: Good  Standing - Dynamic: Fair  Comments: Pt able to maintain sitting balance EOB, SBA, limited by fatigue. Able to maintain standing balance with walker support, CGA. Limited by fatigue. AM-PAC Score  AM-PAC Inpatient Mobility Raw Score : 17 (09/19/23 1435)  AM-PAC Inpatient T-Scale Score : 42.13 (09/19/23 1435)  Mobility Inpatient CMS 0-100% Score: 50.57 (09/19/23 1435)  Mobility Inpatient CMS G-Code Modifier : CK (09/19/23 1435)              Goals  Short Term Goals  Time Frame for Short Term Goals: 2-3 days  Short Term Goal 1: Bed mobility SBA  Short Term Goal 2: Transfers CGA  Short Term Goal 3: Ambulation 48' with RW, CGA  Patient Goals   Patient Goals : To return home       Education  Patient Education  Education Given To: Patient  Education Provided: Role of Therapy;Plan of Care; Fall Prevention Strategies  Education Method: Demonstration;Verbal  Education Outcome: Continued education needed      Therapy Time   Individual Concurrent Group Co-treatment   Time In 62         Time Out 2620         Minutes 24         Timed Code Treatment Minutes: [id number] [place], [place] [id number] 09/19/23 2:39 PM

## 2023-09-19 NOTE — PROGRESS NOTES
Psychiatric  Hypoglycemia Event and Prevention Plan      NAME: Bhupendra Fam  MEDICAL RECORD NUMBER:  [id number]  AGE: 76 y.o. GENDER: female  : 1955  EPISODE DATE:  2023     Data     Recent Labs     23  1128 23  1642 23  2111 23  0231 23  0353 23  0729   POCGLU 197* 212* 228* 68* 76 77       Medications  Scheduled Medications:   divalproex  250 mg Oral 3 times per day    ipratropium  0.5 mg Nebulization TID RT    insulin glargine  5 Units SubCUTAneous Nightly    furosemide  40 mg IntraVENous BID    insulin lispro  0-8 Units SubCUTAneous TID WC    insulin lispro  0-4 Units SubCUTAneous Nightly    [Held by provider] carvedilol  6.25 mg Oral BID WC    pantoprazole (PROTONIX) 40 mg in sodium chloride (PF) 0.9 % 10 mL injection  40 mg IntraVENous 2 times per day    rifAXIMin  550 mg Oral BID    thiamine  100 mg IntraVENous Daily    midodrine  5 mg Oral TID WC    lactulose  20 g Oral TID    multivitamin  1 tablet Oral Daily    folic acid  1 mg Oral Daily    sodium chloride flush  5-40 mL IntraVENous 2 times per day       Diet  Current diet/supplement order: ADULT DIET; Easy to Chew  ADULT ORAL NUTRITION SUPPLEMENT; Breakfast, Lunch, Dinner; Standard 4 oz Oral Supplement     Recorded PO: PO Meals Eaten (%): 1 - 25% last meal in flowsheets PO Supplement (%): 76 - 100%    Action      Total insulin dose yesterday = 5 units.       Physician Notified of event: Yes   Robyn Cevallos MD        Responce     Achieved POCT Blood Glucose greater than 70 mg/dl: Yes      Medication plan updated: Yes      Electronically signed by Shanna Mistry RN on 2023 at 9:00 AM

## 2023-09-19 NOTE — PROGRESS NOTES
Latest Reference Range & Units 09/19/23 02:31   POC Glucose 70 - 99 mg/dl 68 (L)   (L): Data is abnormally low    Pt given 240 mL apple juice, pt tolerated well. Blood glucose rechecked---76. Pt denies any needs at this time. Fall precautions in place. Bed alarm active, call light within reach. Care ongoing.      Electronically signed by Kings Kaufman RN on 9/19/2023 at 4:13 AM

## 2023-09-19 NOTE — CARE COORDINATION
VIRGINIE called [facility] and left a  for return call, and also called [facility]. Vu Mccall is reviewing. VIRGINIE will follow.      Marychuy Gomez LMSW, [address] [facility] Social Work Case Management   Phone: [phone]  Fax: [phone]

## 2023-09-20 LAB
ABO + RH BLD: NORMAL
ALBUMIN SERPL-MCNC: 2.7 G/DL (ref 3.4–5)
ALBUMIN SERPL-MCNC: 3.1 G/DL (ref 3.4–5)
ALBUMIN/GLOB SERPL: 1.5 {RATIO} (ref 1.1–2.2)
ALP SERPL-CCNC: 99 U/L (ref 40–129)
ALT SERPL-CCNC: 15 U/L (ref 10–40)
AMMONIA PLAS-SCNC: 80 UMOL/L (ref 11–51)
ANION GAP SERPL CALCULATED.3IONS-SCNC: 4 MMOL/L (ref 3–16)
ANION GAP SERPL CALCULATED.3IONS-SCNC: 8 MMOL/L (ref 3–16)
AST SERPL-CCNC: 23 U/L (ref 15–37)
BACTERIA UR CULT: NORMAL
BILIRUB SERPL-MCNC: 1.3 MG/DL (ref 0–1)
BLD GP AB SCN SERPL QL: NORMAL
BLOOD BANK DISPENSE STATUS: NORMAL
BLOOD BANK PRODUCT CODE: NORMAL
BPU ID: NORMAL
BUN SERPL-MCNC: 7 MG/DL (ref 7–20)
BUN SERPL-MCNC: 7 MG/DL (ref 7–20)
CALCIUM SERPL-MCNC: 8.2 MG/DL (ref 8.3–10.6)
CALCIUM SERPL-MCNC: 8.3 MG/DL (ref 8.3–10.6)
CHLORIDE SERPL-SCNC: 101 MMOL/L (ref 99–110)
CHLORIDE SERPL-SCNC: 93 MMOL/L (ref 99–110)
CO2 SERPL-SCNC: 35 MMOL/L (ref 21–32)
CO2 SERPL-SCNC: 37 MMOL/L (ref 21–32)
CREAT SERPL-MCNC: 0.6 MG/DL (ref 0.6–1.2)
CREAT SERPL-MCNC: 0.6 MG/DL (ref 0.6–1.2)
DEPRECATED RDW RBC AUTO: 18.8 % (ref 12.4–15.4)
DESCRIPTION BLOOD BANK: NORMAL
GFR SERPLBLD CREATININE-BSD FMLA CKD-EPI: >60 ML/MIN/{1.73_M2}
GFR SERPLBLD CREATININE-BSD FMLA CKD-EPI: >60 ML/MIN/{1.73_M2}
GLUCOSE BLD-MCNC: 126 MG/DL (ref 70–99)
GLUCOSE BLD-MCNC: 143 MG/DL (ref 70–99)
GLUCOSE BLD-MCNC: 144 MG/DL (ref 70–99)
GLUCOSE BLD-MCNC: 197 MG/DL (ref 70–99)
GLUCOSE BLD-MCNC: 249 MG/DL (ref 70–99)
GLUCOSE SERPL-MCNC: 191 MG/DL (ref 70–99)
GLUCOSE SERPL-MCNC: 98 MG/DL (ref 70–99)
HCT VFR BLD AUTO: 21.4 % (ref 36–48)
HCT VFR BLD AUTO: 28.2 % (ref 36–48)
HGB BLD-MCNC: 7 G/DL (ref 12–16)
HGB BLD-MCNC: 9.3 G/DL (ref 12–16)
MCH RBC QN AUTO: 29 PG (ref 26–34)
MCHC RBC AUTO-ENTMCNC: 32.6 G/DL (ref 31–36)
MCV RBC AUTO: 88.9 FL (ref 80–100)
PERFORMED ON: ABNORMAL
PHOSPHATE SERPL-MCNC: 3 MG/DL (ref 2.5–4.9)
PLATELET # BLD AUTO: 66 K/UL (ref 135–450)
PMV BLD AUTO: 9.3 FL (ref 5–10.5)
POTASSIUM SERPL-SCNC: 3.2 MMOL/L (ref 3.5–5.1)
POTASSIUM SERPL-SCNC: 3.7 MMOL/L (ref 3.5–5.1)
POTASSIUM SERPL-SCNC: 4.4 MMOL/L (ref 3.5–5.1)
PROT SERPL-MCNC: 5.2 G/DL (ref 6.4–8.2)
RBC # BLD AUTO: 2.41 M/UL (ref 4–5.2)
SODIUM SERPL-SCNC: 136 MMOL/L (ref 136–145)
SODIUM SERPL-SCNC: 142 MMOL/L (ref 136–145)
WBC # BLD AUTO: 3.5 K/UL (ref 4–11)

## 2023-09-20 PROCEDURE — 2580000003 HC RX 258: Performed by: INTERNAL MEDICINE

## 2023-09-20 PROCEDURE — 6370000000 HC RX 637 (ALT 250 FOR IP): Performed by: INTERNAL MEDICINE

## 2023-09-20 PROCEDURE — 85014 HEMATOCRIT: CPT

## 2023-09-20 PROCEDURE — 94761 N-INVAS EAR/PLS OXIMETRY MLT: CPT

## 2023-09-20 PROCEDURE — 6360000002 HC RX W HCPCS: Performed by: HOSPITALIST

## 2023-09-20 PROCEDURE — 6360000002 HC RX W HCPCS: Performed by: INTERNAL MEDICINE

## 2023-09-20 PROCEDURE — 86923 COMPATIBILITY TEST ELECTRIC: CPT

## 2023-09-20 PROCEDURE — 94640 AIRWAY INHALATION TREATMENT: CPT

## 2023-09-20 PROCEDURE — 36430 TRANSFUSION BLD/BLD COMPNT: CPT

## 2023-09-20 PROCEDURE — 85027 COMPLETE CBC AUTOMATED: CPT

## 2023-09-20 PROCEDURE — P9016 RBC LEUKOCYTES REDUCED: HCPCS

## 2023-09-20 PROCEDURE — C9113 INJ PANTOPRAZOLE SODIUM, VIA: HCPCS | Performed by: INTERNAL MEDICINE

## 2023-09-20 PROCEDURE — 80053 COMPREHEN METABOLIC PANEL: CPT

## 2023-09-20 PROCEDURE — 84132 ASSAY OF SERUM POTASSIUM: CPT

## 2023-09-20 PROCEDURE — 86900 BLOOD TYPING SEROLOGIC ABO: CPT

## 2023-09-20 PROCEDURE — 82140 ASSAY OF AMMONIA: CPT

## 2023-09-20 PROCEDURE — 36592 COLLECT BLOOD FROM PICC: CPT

## 2023-09-20 PROCEDURE — 2060000000 HC ICU INTERMEDIATE R&B

## 2023-09-20 PROCEDURE — 85018 HEMOGLOBIN: CPT

## 2023-09-20 PROCEDURE — 97530 THERAPEUTIC ACTIVITIES: CPT

## 2023-09-20 PROCEDURE — 2580000003 HC RX 258: Performed by: ANESTHESIOLOGY

## 2023-09-20 PROCEDURE — 2700000000 HC OXYGEN THERAPY PER DAY

## 2023-09-20 PROCEDURE — 86850 RBC ANTIBODY SCREEN: CPT

## 2023-09-20 PROCEDURE — 97535 SELF CARE MNGMENT TRAINING: CPT

## 2023-09-20 PROCEDURE — 86901 BLOOD TYPING SEROLOGIC RH(D): CPT

## 2023-09-20 RX ORDER — GAUZE BANDAGE 2" X 2"
100 BANDAGE TOPICAL DAILY
Status: DISCONTINUED | OUTPATIENT
Start: 2023-09-21 | End: 2023-09-21 | Stop reason: HOSPADM

## 2023-09-20 RX ORDER — FUROSEMIDE 40 MG/1
40 TABLET ORAL DAILY
Status: DISCONTINUED | OUTPATIENT
Start: 2023-09-21 | End: 2023-09-21 | Stop reason: HOSPADM

## 2023-09-20 RX ORDER — PANTOPRAZOLE SODIUM 40 MG/1
40 TABLET, DELAYED RELEASE ORAL
Status: DISCONTINUED | OUTPATIENT
Start: 2023-09-20 | End: 2023-09-21 | Stop reason: HOSPADM

## 2023-09-20 RX ORDER — SODIUM CHLORIDE 9 MG/ML
INJECTION, SOLUTION INTRAVENOUS PRN
Status: DISCONTINUED | OUTPATIENT
Start: 2023-09-20 | End: 2023-09-21 | Stop reason: HOSPADM

## 2023-09-20 RX ADMIN — MIDODRINE HYDROCHLORIDE 5 MG: 5 TABLET ORAL at 17:08

## 2023-09-20 RX ADMIN — DIVALPROEX SODIUM 250 MG: 125 CAPSULE ORAL at 14:30

## 2023-09-20 RX ADMIN — THIAMINE HYDROCHLORIDE 100 MG: 100 INJECTION, SOLUTION INTRAMUSCULAR; INTRAVENOUS at 08:48

## 2023-09-20 RX ADMIN — RIFAXIMIN 550 MG: 550 TABLET ORAL at 08:47

## 2023-09-20 RX ADMIN — IPRATROPIUM BROMIDE 0.5 MG: 0.5 SOLUTION RESPIRATORY (INHALATION) at 11:46

## 2023-09-20 RX ADMIN — FOLIC ACID 1 MG: 1 TABLET ORAL at 08:47

## 2023-09-20 RX ADMIN — Medication 40 MG: at 08:47

## 2023-09-20 RX ADMIN — Medication 10 ML: at 21:10

## 2023-09-20 RX ADMIN — PANTOPRAZOLE SODIUM 40 MG: 40 TABLET, DELAYED RELEASE ORAL at 15:52

## 2023-09-20 RX ADMIN — IPRATROPIUM BROMIDE 0.5 MG: 0.5 SOLUTION RESPIRATORY (INHALATION) at 08:07

## 2023-09-20 RX ADMIN — DIVALPROEX SODIUM 250 MG: 125 CAPSULE ORAL at 21:08

## 2023-09-20 RX ADMIN — MIDODRINE HYDROCHLORIDE 5 MG: 5 TABLET ORAL at 08:47

## 2023-09-20 RX ADMIN — THERA TABS 1 TABLET: TAB at 08:47

## 2023-09-20 RX ADMIN — DIVALPROEX SODIUM 250 MG: 125 CAPSULE ORAL at 05:09

## 2023-09-20 RX ADMIN — RIFAXIMIN 550 MG: 550 TABLET ORAL at 21:08

## 2023-09-20 RX ADMIN — FUROSEMIDE 40 MG: 10 INJECTION, SOLUTION INTRAMUSCULAR; INTRAVENOUS at 08:47

## 2023-09-20 RX ADMIN — METFORMIN HYDROCHLORIDE 500 MG: 500 TABLET, FILM COATED ORAL at 08:47

## 2023-09-20 RX ADMIN — MIDODRINE HYDROCHLORIDE 5 MG: 5 TABLET ORAL at 12:35

## 2023-09-20 RX ADMIN — POTASSIUM CHLORIDE 20 MEQ: 29.8 INJECTION, SOLUTION INTRAVENOUS at 17:09

## 2023-09-20 RX ADMIN — IRON SUCROSE 200 MG: 20 INJECTION, SOLUTION INTRAVENOUS at 15:52

## 2023-09-20 ASSESSMENT — PAIN SCALES - GENERAL: PAINLEVEL_OUTOF10: 0

## 2023-09-20 NOTE — PLAN OF CARE
Problem: Nutrition Deficit:  Goal: Optimize nutritional status  Outcome: Progressing  Flowsheets (Taken 9/19/2023 1507 by Cash Rizvi RD, LD)  Nutrient intake appropriate for improving, restoring, or maintaining nutritional needs: Monitor oral intake, labs, and treatment plans

## 2023-09-20 NOTE — PROGRESS NOTES
AROM: Within functional limits  PROM: Within functional limits  Strength: Within functional limits  Coordination: Within functional limits  Tone: Normal  ADL  Grooming: Contact guard assistance (in stance at sink to wash hands; CGA for balance)  LE Dressing: Moderate assistance (assist to don/doff brief; pt able to manage over hips; assist to thread B LEs; CGA for balance)  Toileting: Contact guard assistance (pt able to complete pericare seated; able to manage brief over hips; CGA for balance)              Vision  Vision: Within Functional Limits  Hearing  Hearing: Within functional limits  Cognition  Overall Cognitive Status: Exceptions  Following Commands: Follows one step commands with increased time; Follows one step commands with repetition  Memory: Decreased short term memory  Insights: Decreased awareness of deficits  Initiation: Requires cues for some  Sequencing: Requires cues for some  Orientation  Overall Orientation Status: Impaired  Orientation Level: Oriented to place;Oriented to time;Oriented to person;Disoriented to situation                  Education Given To: Patient  Education Provided: Role of Therapy;Plan of Care;ADL Adaptive Strategies;Transfer Training  Education Method: Demonstration;Verbal  Barriers to Learning: Cognition  Education Outcome: Verbalized understanding;Demonstrated understanding;Continued education needed           AM-PAC Score        AM-[facility] Inpatient Daily Activity Raw Score: 17 (09/20/23 1039)  AM-PAC Inpatient ADL T-Scale Score : 37.26 (09/20/23 1039)  ADL Inpatient CMS 0-100% Score: 50.11 (09/20/23 1039)  ADL Inpatient CMS G-Code Modifier : CK (09/20/23 1039)    Tinneti Score       Goals  Short Term Goals  Time Frame for Short Term Goals: prior to D/C; ongoing 9/20  Short Term Goal 1: complete functional mobility and transfers with supervision  Short Term Goal 2: complete bathing and dressing with supervision  Short Term Goal 3: complete toileting with supervision  Short Term Goal 4: complete grooming in stance at sink with supervision  Long Term Goals  Time Frame for Long Term Goals : STG=LTG  Patient Goals   Patient goals : return home       Therapy Time   Individual Concurrent Group Co-treatment   Time In 1015         Time Out 1039         Minutes 24         Timed Code Treatment Minutes: 24 Minutes       Molina Gamez OTR/L

## 2023-09-20 NOTE — CARE COORDINATION
Case Management Discharge Note          Date / Time of Note: 9/20/2023 11:32 AM                  Patient Name: Zenaida Winn   YOB: 1955  Diagnosis: Upper GI bleed [K92.2]   Date / Time: 9/3/2023  3:40 PM    Financial:  Payor: MEDICARE / Plan: MEDICARE PART A AND B / Product Type: *No Product type* /      Pharmacy:    [address] [place] [address] [phone] - [place] [address]  Phone: [phone] Fax: [address] [place] [address] [facility], [address] [facility] [phone] - f [phone] [address] [facility] [address]  Phone: [phone] Fax: [phone]      Assistance purchasing medications?: Potential Assistance Purchasing Medications: No  Assistance provided by Case Management: None at this time    DISCHARGE Disposition: [address] (SNF)    Nursing Facility:   Discharging to Facility/ Agency   Name: [facility]  Address:  [address] [place] [address] [facility]   Phone:  [phone]  Fax:  [phone]     LOC at discharge: Skilled Nursing  ROSLYN Completed: Yes             NURSING REPORT NUMBER: [phone]               NURSING FAX NUMBER: [phone]    Notification completed in HENS/PAS?:  Yes : CM has completed HENS online through secure website for SNF admission at [address] [place] [address]. Document ID #: [id number]      Transportation:  Transportation PLAN for discharge: EMS transportation   Mode of Transport: Ambulance stretcher - BLS  Reason for medical transport: Other: increase weakness, confusion  Name of [address] [place] [address] Road: [facility]  Phone: [phone]  Time of Transport: [facility] AT [place] 9/21- 10:30am    Transport form completed: Yes    IMM Completed:   Yes, Case management has presented and reviewed IMM letter #2.    IMM Letter given to Patient/Family/Significant other/Guardian/POA/by[de-identified] To patient's son Shorty Sizer, by VIRGINIE

## 2023-09-20 NOTE — PROGRESS NOTES
Slept off and on throughout night shift. Able to answer orientation questions correctly at times but is not fully oriented. Continues to have frequent delusions where she talks as if she is in a different environment. Remains impulsive and has difficulty following commands at times. In bed, safety precautions in place.

## 2023-09-20 NOTE — PROGRESS NOTES
Per PCA, O2 was reading 87%. This RN to bedside and found patient without oxygen. 2L NC placed back on patient and O2 sats up to 94%.      Electronically signed by Michael Justin RN on 9/20/2023 at 11:32 AM

## 2023-09-21 VITALS
HEART RATE: 64 BPM | DIASTOLIC BLOOD PRESSURE: 52 MMHG | WEIGHT: 82.89 LBS | BODY MASS INDEX: 15.25 KG/M2 | OXYGEN SATURATION: 95 % | HEIGHT: 62 IN | TEMPERATURE: 98.1 F | SYSTOLIC BLOOD PRESSURE: 118 MMHG | RESPIRATION RATE: 21 BRPM

## 2023-09-21 LAB
ALBUMIN SERPL-MCNC: 3.1 G/DL (ref 3.4–5)
AMMONIA PLAS-SCNC: 101 UMOL/L (ref 11–51)
ANION GAP SERPL CALCULATED.3IONS-SCNC: 7 MMOL/L (ref 3–16)
BASOPHILS # BLD: 0 K/UL (ref 0–0.2)
BASOPHILS NFR BLD: 0.8 %
BUN SERPL-MCNC: 8 MG/DL (ref 7–20)
CALCIUM SERPL-MCNC: 8.4 MG/DL (ref 8.3–10.6)
CHLORIDE SERPL-SCNC: 100 MMOL/L (ref 99–110)
CO2 SERPL-SCNC: 33 MMOL/L (ref 21–32)
CREAT SERPL-MCNC: 0.5 MG/DL (ref 0.6–1.2)
DEPRECATED RDW RBC AUTO: 19.5 % (ref 12.4–15.4)
EOSINOPHIL # BLD: 0.2 K/UL (ref 0–0.6)
EOSINOPHIL NFR BLD: 3.8 %
GFR SERPLBLD CREATININE-BSD FMLA CKD-EPI: >60 ML/MIN/{1.73_M2}
GLUCOSE BLD-MCNC: 175 MG/DL (ref 70–99)
GLUCOSE BLD-MCNC: 90 MG/DL (ref 70–99)
GLUCOSE SERPL-MCNC: 150 MG/DL (ref 70–99)
HCT VFR BLD AUTO: 27.9 % (ref 36–48)
HGB BLD-MCNC: 9.1 G/DL (ref 12–16)
LYMPHOCYTES # BLD: 0.7 K/UL (ref 1–5.1)
LYMPHOCYTES NFR BLD: 11.7 %
MCH RBC QN AUTO: 28.9 PG (ref 26–34)
MCHC RBC AUTO-ENTMCNC: 32.8 G/DL (ref 31–36)
MCV RBC AUTO: 88 FL (ref 80–100)
MONOCYTES # BLD: 0.7 K/UL (ref 0–1.3)
MONOCYTES NFR BLD: 10.8 %
NEUTROPHILS # BLD: 4.5 K/UL (ref 1.7–7.7)
NEUTROPHILS NFR BLD: 72.9 %
PERFORMED ON: ABNORMAL
PERFORMED ON: NORMAL
PHOSPHATE SERPL-MCNC: 3.4 MG/DL (ref 2.5–4.9)
PLATELET # BLD AUTO: 77 K/UL (ref 135–450)
PMV BLD AUTO: 9 FL (ref 5–10.5)
POTASSIUM SERPL-SCNC: 4.4 MMOL/L (ref 3.5–5.1)
RBC # BLD AUTO: 3.17 M/UL (ref 4–5.2)
REASON FOR REJECTION: NORMAL
REJECTED TEST: NORMAL
SODIUM SERPL-SCNC: 140 MMOL/L (ref 136–145)
WBC # BLD AUTO: 6.2 K/UL (ref 4–11)

## 2023-09-21 PROCEDURE — 94640 AIRWAY INHALATION TREATMENT: CPT

## 2023-09-21 PROCEDURE — 6370000000 HC RX 637 (ALT 250 FOR IP): Performed by: INTERNAL MEDICINE

## 2023-09-21 PROCEDURE — 6360000002 HC RX W HCPCS: Performed by: INTERNAL MEDICINE

## 2023-09-21 PROCEDURE — 94761 N-INVAS EAR/PLS OXIMETRY MLT: CPT

## 2023-09-21 PROCEDURE — 36592 COLLECT BLOOD FROM PICC: CPT

## 2023-09-21 PROCEDURE — 2700000000 HC OXYGEN THERAPY PER DAY

## 2023-09-21 PROCEDURE — 85025 COMPLETE CBC W/AUTO DIFF WBC: CPT

## 2023-09-21 PROCEDURE — 82140 ASSAY OF AMMONIA: CPT

## 2023-09-21 PROCEDURE — 2580000003 HC RX 258: Performed by: ANESTHESIOLOGY

## 2023-09-21 PROCEDURE — 80069 RENAL FUNCTION PANEL: CPT

## 2023-09-21 RX ADMIN — Medication 10 ML: at 08:07

## 2023-09-21 RX ADMIN — RIFAXIMIN 550 MG: 550 TABLET ORAL at 08:07

## 2023-09-21 RX ADMIN — PANTOPRAZOLE SODIUM 40 MG: 40 TABLET, DELAYED RELEASE ORAL at 05:07

## 2023-09-21 RX ADMIN — METFORMIN HYDROCHLORIDE 500 MG: 500 TABLET, FILM COATED ORAL at 08:07

## 2023-09-21 RX ADMIN — FUROSEMIDE 40 MG: 40 TABLET ORAL at 08:07

## 2023-09-21 RX ADMIN — FOLIC ACID 1 MG: 1 TABLET ORAL at 08:07

## 2023-09-21 RX ADMIN — DIVALPROEX SODIUM 250 MG: 125 CAPSULE ORAL at 05:07

## 2023-09-21 RX ADMIN — Medication 100 MG: at 08:07

## 2023-09-21 RX ADMIN — MIDODRINE HYDROCHLORIDE 5 MG: 5 TABLET ORAL at 08:07

## 2023-09-21 RX ADMIN — IPRATROPIUM BROMIDE 0.5 MG: 0.5 SOLUTION RESPIRATORY (INHALATION) at 07:55

## 2023-09-21 RX ADMIN — LACTULOSE 20 G: 20 SOLUTION ORAL at 08:07

## 2023-09-21 RX ADMIN — IRON SUCROSE 200 MG: 20 INJECTION, SOLUTION INTRAVENOUS at 08:07

## 2023-09-21 RX ADMIN — THERA TABS 1 TABLET: TAB at 08:07

## 2023-09-21 NOTE — PROGRESS NOTES
Report given to medical transport. All paperwork given. patient transported off unit via stretcher. Report called to Bahman Wu, call back number provided if any other questions arise. Family updated by social work.      Electronically signed by Rafa Bustamante RN on 9/21/2023 at 11:33 AM

## 2023-09-21 NOTE — PROGRESS NOTES
Slept well, easily aroused for routine checks. Refused lactulose this shift. Re-educated patient to the purpose of lactulose and ammonia levels. In bed, safety precautions in place.

## 2023-09-21 NOTE — PLAN OF CARE
Problem: Nutrition Deficit:  Goal: Optimize nutritional status  9/21/2023 1134 by Hannah Floyd RN  Outcome: Completed  9/20/2023 2313 by Kwabena Jose RN  Outcome: Progressing

## 2023-09-21 NOTE — PROGRESS NOTES
Patient woke up c/o throbbing headache and sore throat. Says that she does not get headaches frequently. PRN given.

## (undated) DEVICE — ENDOSCOPY KIT: Brand: MEDLINE INDUSTRIES, INC.

## (undated) DEVICE — ESOPHAGEAL/PYLORIC/COLONIC/BILIARY WIREGUIDED BALLOON DILATATION CATHETER: Brand: CRE™ PRO

## (undated) DEVICE — SINGLE-USE POLYPECTOMY SNARE: Brand: CAPTIFLEX

## (undated) DEVICE — SIX SHOOTER SAEED MULTI-BAND LIGATOR: Brand: SAEED

## (undated) DEVICE — INTEGRATED INFLATION/LITHO DEVICE: Brand: ALLIANCE II

## (undated) DEVICE — MOUTHPIECE ENDOSCP L CTRL OPN AND SIDE PORTS DISP

## (undated) DEVICE — BITE BLOCK ENDOSCP AD 60 FR W/ ADJ STRP PLAS GRN BLOX

## (undated) DEVICE — CONTAINER SPEC 480ML CLR POLYSTYR 10% NEUT BUFF FRMLN ZN

## (undated) DEVICE — TRAP POLYP ETRAP

## (undated) DEVICE — REPLAY HEMOSTASIS CLIP, 16MM SPAN: Brand: REPLAY

## (undated) DEVICE — NEEDLE INJ 25GA P5MM SHFT L230CM SHTH DIA2.5MM S STL TEF

## (undated) DEVICE — Device

## (undated) DEVICE — CLIP LIG L235CM RESOL 360 BX/20

## (undated) DEVICE — ENDOSCOPIC ULTRASOUND ASPIRATION NEEDLE: Brand: EXPECT SLIMLINE SL

## (undated) DEVICE — CAPSULE ENDOSCP L26.2MM DIA11.4MM BIOCOMPATIBLE PLAS SB 3

## (undated) DEVICE — FORCEPS BX 240CM 2.4MM L NDL RAD JAW 4 M00513334

## (undated) DEVICE — ESOPHAGEAL BALLOON DILATATION CATHETER: Brand: CRE FIXED WIRE

## (undated) DEVICE — ENDO CARRY-ON PROCEDURE KIT: Brand: ENDO CARRY-ON PROCEDURE KIT

## (undated) DEVICE — ELECTRODE PT RET AD L9FT HI MOIST COND ADH HYDRGEL CORDED